# Patient Record
Sex: FEMALE | Race: WHITE | NOT HISPANIC OR LATINO | Employment: OTHER | URBAN - NONMETROPOLITAN AREA
[De-identification: names, ages, dates, MRNs, and addresses within clinical notes are randomized per-mention and may not be internally consistent; named-entity substitution may affect disease eponyms.]

---

## 2020-06-09 ENCOUNTER — OFFICE VISIT (OUTPATIENT)
Dept: OBGYN CLINIC | Facility: CLINIC | Age: 70
End: 2020-06-09
Payer: MEDICARE

## 2020-06-09 ENCOUNTER — APPOINTMENT (OUTPATIENT)
Dept: RADIOLOGY | Facility: MEDICAL CENTER | Age: 70
End: 2020-06-09
Payer: MEDICARE

## 2020-06-09 VITALS
WEIGHT: 174 LBS | DIASTOLIC BLOOD PRESSURE: 108 MMHG | TEMPERATURE: 98.4 F | HEIGHT: 68 IN | BODY MASS INDEX: 26.37 KG/M2 | SYSTOLIC BLOOD PRESSURE: 175 MMHG

## 2020-06-09 DIAGNOSIS — M75.41 IMPINGEMENT SYNDROME OF RIGHT SHOULDER: ICD-10-CM

## 2020-06-09 DIAGNOSIS — M47.22 OSTEOARTHRITIS OF SPINE WITH RADICULOPATHY, CERVICAL REGION: ICD-10-CM

## 2020-06-09 DIAGNOSIS — M75.42 IMPINGEMENT SYNDROME OF LEFT SHOULDER: ICD-10-CM

## 2020-06-09 DIAGNOSIS — M50.20 HNP (HERNIATED NUCLEUS PULPOSUS), CERVICAL: Primary | ICD-10-CM

## 2020-06-09 DIAGNOSIS — M50.20 HNP (HERNIATED NUCLEUS PULPOSUS), CERVICAL: ICD-10-CM

## 2020-06-09 PROBLEM — M47.812 DJD (DEGENERATIVE JOINT DISEASE), CERVICAL: Status: ACTIVE | Noted: 2020-06-09

## 2020-06-09 PROBLEM — M47.812 DJD (DEGENERATIVE JOINT DISEASE), CERVICAL: Status: RESOLVED | Noted: 2020-06-09 | Resolved: 2020-06-09

## 2020-06-09 PROCEDURE — 73030 X-RAY EXAM OF SHOULDER: CPT

## 2020-06-09 PROCEDURE — 72040 X-RAY EXAM NECK SPINE 2-3 VW: CPT

## 2020-06-09 PROCEDURE — 20610 DRAIN/INJ JOINT/BURSA W/O US: CPT | Performed by: ORTHOPAEDIC SURGERY

## 2020-06-09 PROCEDURE — 99213 OFFICE O/P EST LOW 20 MIN: CPT | Performed by: ORTHOPAEDIC SURGERY

## 2020-06-09 RX ORDER — FUROSEMIDE 20 MG/1
20 TABLET ORAL
COMMUNITY
Start: 2019-05-06 | End: 2021-01-05

## 2020-06-09 RX ORDER — IBUPROFEN 800 MG/1
200 TABLET ORAL
Status: ON HOLD | COMMUNITY
Start: 2019-06-27 | End: 2021-01-20 | Stop reason: CLARIF

## 2020-06-09 RX ORDER — METOPROLOL SUCCINATE 25 MG/1
25 TABLET, EXTENDED RELEASE ORAL DAILY
COMMUNITY
Start: 2019-12-03

## 2020-06-09 RX ORDER — AZITHROMYCIN 250 MG/1
TABLET, FILM COATED ORAL
COMMUNITY
Start: 2020-05-13 | End: 2021-01-05

## 2020-06-09 RX ORDER — OMEPRAZOLE 20 MG/1
20 CAPSULE, DELAYED RELEASE ORAL AS NEEDED
COMMUNITY

## 2020-06-09 RX ADMIN — METHYLPREDNISOLONE ACETATE 2 ML: 40 INJECTION, SUSPENSION INTRA-ARTICULAR; INTRALESIONAL; INTRAMUSCULAR; SOFT TISSUE at 14:05

## 2020-06-09 RX ADMIN — BUPIVACAINE HYDROCHLORIDE 6 ML: 5 INJECTION, SOLUTION EPIDURAL; INTRACAUDAL at 14:05

## 2020-06-10 ENCOUNTER — EVALUATION (OUTPATIENT)
Dept: PHYSICAL THERAPY | Facility: CLINIC | Age: 70
End: 2020-06-10
Payer: MEDICARE

## 2020-06-10 DIAGNOSIS — M47.22 OSTEOARTHRITIS OF SPINE WITH RADICULOPATHY, CERVICAL REGION: ICD-10-CM

## 2020-06-10 DIAGNOSIS — M75.41 IMPINGEMENT SYNDROME OF RIGHT SHOULDER: ICD-10-CM

## 2020-06-10 DIAGNOSIS — M75.42 IMPINGEMENT SYNDROME OF LEFT SHOULDER: Primary | ICD-10-CM

## 2020-06-10 PROCEDURE — 97162 PT EVAL MOD COMPLEX 30 MIN: CPT | Performed by: PHYSICAL THERAPIST

## 2020-06-10 PROCEDURE — 97110 THERAPEUTIC EXERCISES: CPT | Performed by: PHYSICAL THERAPIST

## 2020-06-10 PROCEDURE — 97014 ELECTRIC STIMULATION THERAPY: CPT | Performed by: PHYSICAL THERAPIST

## 2020-06-14 RX ORDER — METHYLPREDNISOLONE ACETATE 40 MG/ML
2 INJECTION, SUSPENSION INTRA-ARTICULAR; INTRALESIONAL; INTRAMUSCULAR; SOFT TISSUE
Status: DISCONTINUED | OUTPATIENT
Start: 2020-06-09 | End: 2021-01-20 | Stop reason: CLARIF

## 2020-06-14 RX ORDER — BUPIVACAINE HYDROCHLORIDE 5 MG/ML
6 INJECTION, SOLUTION EPIDURAL; INTRACAUDAL
Status: DISCONTINUED | OUTPATIENT
Start: 2020-06-09 | End: 2021-01-20 | Stop reason: CLARIF

## 2020-06-15 ENCOUNTER — APPOINTMENT (OUTPATIENT)
Dept: PHYSICAL THERAPY | Facility: CLINIC | Age: 70
End: 2020-06-15
Payer: MEDICARE

## 2020-06-15 ENCOUNTER — OFFICE VISIT (OUTPATIENT)
Dept: PHYSICAL THERAPY | Facility: CLINIC | Age: 70
End: 2020-06-15
Payer: MEDICARE

## 2020-06-15 DIAGNOSIS — M75.42 IMPINGEMENT SYNDROME OF LEFT SHOULDER: Primary | ICD-10-CM

## 2020-06-15 DIAGNOSIS — M47.22 OSTEOARTHRITIS OF SPINE WITH RADICULOPATHY, CERVICAL REGION: ICD-10-CM

## 2020-06-15 DIAGNOSIS — M75.41 IMPINGEMENT SYNDROME OF RIGHT SHOULDER: ICD-10-CM

## 2020-06-15 PROCEDURE — 97110 THERAPEUTIC EXERCISES: CPT | Performed by: PHYSICAL THERAPIST

## 2020-06-15 PROCEDURE — 97140 MANUAL THERAPY 1/> REGIONS: CPT | Performed by: PHYSICAL THERAPIST

## 2020-06-15 PROCEDURE — 97014 ELECTRIC STIMULATION THERAPY: CPT | Performed by: PHYSICAL THERAPIST

## 2020-06-15 PROCEDURE — 97112 NEUROMUSCULAR REEDUCATION: CPT | Performed by: PHYSICAL THERAPIST

## 2020-06-17 ENCOUNTER — OFFICE VISIT (OUTPATIENT)
Dept: PHYSICAL THERAPY | Facility: CLINIC | Age: 70
End: 2020-06-17
Payer: MEDICARE

## 2020-06-17 DIAGNOSIS — M75.42 IMPINGEMENT SYNDROME OF LEFT SHOULDER: Primary | ICD-10-CM

## 2020-06-17 DIAGNOSIS — M47.22 OSTEOARTHRITIS OF SPINE WITH RADICULOPATHY, CERVICAL REGION: ICD-10-CM

## 2020-06-17 DIAGNOSIS — M75.41 IMPINGEMENT SYNDROME OF RIGHT SHOULDER: ICD-10-CM

## 2020-06-17 PROCEDURE — 97110 THERAPEUTIC EXERCISES: CPT | Performed by: PHYSICAL THERAPIST

## 2020-06-17 PROCEDURE — 97014 ELECTRIC STIMULATION THERAPY: CPT | Performed by: PHYSICAL THERAPIST

## 2020-06-17 PROCEDURE — 97140 MANUAL THERAPY 1/> REGIONS: CPT | Performed by: PHYSICAL THERAPIST

## 2020-06-17 PROCEDURE — 97112 NEUROMUSCULAR REEDUCATION: CPT | Performed by: PHYSICAL THERAPIST

## 2020-06-22 ENCOUNTER — OFFICE VISIT (OUTPATIENT)
Dept: PHYSICAL THERAPY | Facility: CLINIC | Age: 70
End: 2020-06-22
Payer: MEDICARE

## 2020-06-22 DIAGNOSIS — M75.42 IMPINGEMENT SYNDROME OF LEFT SHOULDER: Primary | ICD-10-CM

## 2020-06-22 DIAGNOSIS — M75.41 IMPINGEMENT SYNDROME OF RIGHT SHOULDER: ICD-10-CM

## 2020-06-22 DIAGNOSIS — M47.22 OSTEOARTHRITIS OF SPINE WITH RADICULOPATHY, CERVICAL REGION: ICD-10-CM

## 2020-06-22 PROCEDURE — 97140 MANUAL THERAPY 1/> REGIONS: CPT | Performed by: PHYSICAL THERAPIST

## 2020-06-22 PROCEDURE — 97112 NEUROMUSCULAR REEDUCATION: CPT | Performed by: PHYSICAL THERAPIST

## 2020-06-22 PROCEDURE — 97035 APP MDLTY 1+ULTRASOUND EA 15: CPT | Performed by: PHYSICAL THERAPIST

## 2020-06-22 PROCEDURE — 97110 THERAPEUTIC EXERCISES: CPT | Performed by: PHYSICAL THERAPIST

## 2020-06-22 PROCEDURE — 97014 ELECTRIC STIMULATION THERAPY: CPT | Performed by: PHYSICAL THERAPIST

## 2020-06-24 ENCOUNTER — OFFICE VISIT (OUTPATIENT)
Dept: PHYSICAL THERAPY | Facility: CLINIC | Age: 70
End: 2020-06-24
Payer: MEDICARE

## 2020-06-24 DIAGNOSIS — M75.41 IMPINGEMENT SYNDROME OF RIGHT SHOULDER: ICD-10-CM

## 2020-06-24 DIAGNOSIS — M75.42 IMPINGEMENT SYNDROME OF LEFT SHOULDER: Primary | ICD-10-CM

## 2020-06-24 DIAGNOSIS — M47.22 OSTEOARTHRITIS OF SPINE WITH RADICULOPATHY, CERVICAL REGION: ICD-10-CM

## 2020-06-24 PROCEDURE — 97112 NEUROMUSCULAR REEDUCATION: CPT | Performed by: PHYSICAL THERAPIST

## 2020-06-24 PROCEDURE — 97035 APP MDLTY 1+ULTRASOUND EA 15: CPT | Performed by: PHYSICAL THERAPIST

## 2020-06-24 PROCEDURE — 97014 ELECTRIC STIMULATION THERAPY: CPT | Performed by: PHYSICAL THERAPIST

## 2020-06-24 PROCEDURE — 97140 MANUAL THERAPY 1/> REGIONS: CPT | Performed by: PHYSICAL THERAPIST

## 2020-06-24 PROCEDURE — 97110 THERAPEUTIC EXERCISES: CPT | Performed by: PHYSICAL THERAPIST

## 2020-06-29 ENCOUNTER — OFFICE VISIT (OUTPATIENT)
Dept: PHYSICAL THERAPY | Facility: CLINIC | Age: 70
End: 2020-06-29
Payer: MEDICARE

## 2020-06-29 DIAGNOSIS — M75.42 IMPINGEMENT SYNDROME OF LEFT SHOULDER: Primary | ICD-10-CM

## 2020-06-29 DIAGNOSIS — M75.41 IMPINGEMENT SYNDROME OF RIGHT SHOULDER: ICD-10-CM

## 2020-06-29 DIAGNOSIS — M47.22 OSTEOARTHRITIS OF SPINE WITH RADICULOPATHY, CERVICAL REGION: ICD-10-CM

## 2020-06-29 PROCEDURE — 97112 NEUROMUSCULAR REEDUCATION: CPT

## 2020-06-29 PROCEDURE — 97110 THERAPEUTIC EXERCISES: CPT

## 2020-06-29 PROCEDURE — 97035 APP MDLTY 1+ULTRASOUND EA 15: CPT

## 2020-06-29 PROCEDURE — 97140 MANUAL THERAPY 1/> REGIONS: CPT

## 2020-07-01 ENCOUNTER — OFFICE VISIT (OUTPATIENT)
Dept: PHYSICAL THERAPY | Facility: CLINIC | Age: 70
End: 2020-07-01
Payer: MEDICARE

## 2020-07-01 DIAGNOSIS — M75.42 IMPINGEMENT SYNDROME OF LEFT SHOULDER: Primary | ICD-10-CM

## 2020-07-01 DIAGNOSIS — M47.22 OSTEOARTHRITIS OF SPINE WITH RADICULOPATHY, CERVICAL REGION: ICD-10-CM

## 2020-07-01 DIAGNOSIS — M75.41 IMPINGEMENT SYNDROME OF RIGHT SHOULDER: ICD-10-CM

## 2020-07-01 PROCEDURE — 97140 MANUAL THERAPY 1/> REGIONS: CPT | Performed by: PHYSICAL THERAPIST

## 2020-07-01 PROCEDURE — 97110 THERAPEUTIC EXERCISES: CPT | Performed by: PHYSICAL THERAPIST

## 2020-07-01 PROCEDURE — 97035 APP MDLTY 1+ULTRASOUND EA 15: CPT | Performed by: PHYSICAL THERAPIST

## 2020-07-01 PROCEDURE — 97112 NEUROMUSCULAR REEDUCATION: CPT | Performed by: PHYSICAL THERAPIST

## 2020-07-01 PROCEDURE — 97014 ELECTRIC STIMULATION THERAPY: CPT | Performed by: PHYSICAL THERAPIST

## 2020-07-01 NOTE — PROGRESS NOTES
Daily Note     Today's date: 2020  Patient name: Chris Muse  : 1950  MRN: [id number]  Referring provider: Ankit Oleary DO  Dx:   Encounter Diagnosis     ICD-10-CM    1  Impingement syndrome of left shoulder M75 42    2  Impingement syndrome of right shoulder M75 41    3  Osteoarthritis of spine with radiculopathy, cervical region M47 22                   Subjective: Pt reports R shoulder was more sore than it has been this morning  Pt reports she did not sleep well and reports difficulty lifting R arm this date  Objective: See treatment diary below  R shoulder flex AROM when entering 110, PROM after joint mobilization 160 flex and 170 abduction with soreness and pain at end range  Increased tenderness anterior shoulder this date  Assessment: Tolerated treatment well  Patient demonstrated fatigue post treatment and exhibited good technique with therapeutic exercises  Pt with increased R shoulder AROM flex to 155 degrees after manual therapy and joint mobilization with decreased pain and tightness  Pt did note soreness with TE and pec stretch  Plan: Continue per plan of care        Precautions: none      DATE: 7/1/20 6/15/20 6/17/20 6/22/20 6/24 6/29   Visit # 7 2 3 4 5 6   Pain 8 R 8 R 7 R w/ mov 7 R with movement 6/10 R shoulder 2/10R  s move  6/10 with   Manuals         jt mobilization B/L shoulders [facility]   PROM B/L shoulders [facility]   STM C-Spine [facility] ds   Traction/retra ext C-spine [facility] ds   Neuro Re-Ed         scap retraction x20 x20 x30 x30  home   UBE retro 10 min L4 8 min L3 8m L3 8 min L4 8 min L4 8m L4   T-band SHALONDA L4 3/10 L3 2/10 L3 3/10 L4 3/10 L4 3/10 L4 3/10   T-band tricep ext L4 3/10 L3 2/10 L3 3/10 L4 3/10 L4 3/10 L4 3/10   T-band row L4 3/10 L3 2/10 L3 3/10 L4 3/10 L4 3/10 L4 3/10   T-band IR L4 3/10 L3 2/10 L3 3/10 L4 3/10 L4 3/10 L4 3/10   T-band ER L4 3/10 L3 2/10 L3 3/10 L4 3/10 L4 3/10 L4 3/10   Ther Ex         Pendulums B/L 3# x30 x20 2/15 3# 2/15 3# 2/15 3# 2/15   Doorway Stretch 4x 15" 4 x15" 4 x20" 4x 20" 4 x20" 4x 20"   Levator stretch 4x 15" 4 x15" 4x 20" 4x 20" 4 x20" 4x 20"   Upper trap str 4x 15" 4 x 15" 4x 20" 4x 20" 4 x20" 4x 20"   Standing T's/Y's 2/10  2/10 2/10 2/10 2/10   Serratus punches 3# 2/15  3# 2/10 3# 2/10 3# 2/15 3# 2/15   SL abd/er 1# 2/10   --------> 1# 2/10 1# 2/10   Ther Activity                                    Modalities         CPw/ pre mod B/L shoulders 15 min 15 min 15 min 15 min 15 min 15m   US 3Mhz 1 0 W/cm R shoulder 10   8 min 8 min 10m

## 2020-07-06 ENCOUNTER — OFFICE VISIT (OUTPATIENT)
Dept: PHYSICAL THERAPY | Facility: CLINIC | Age: 70
End: 2020-07-06
Payer: MEDICARE

## 2020-07-06 DIAGNOSIS — M47.22 OSTEOARTHRITIS OF SPINE WITH RADICULOPATHY, CERVICAL REGION: ICD-10-CM

## 2020-07-06 DIAGNOSIS — M75.41 IMPINGEMENT SYNDROME OF RIGHT SHOULDER: ICD-10-CM

## 2020-07-06 DIAGNOSIS — M75.42 IMPINGEMENT SYNDROME OF LEFT SHOULDER: Primary | ICD-10-CM

## 2020-07-06 PROCEDURE — 97112 NEUROMUSCULAR REEDUCATION: CPT | Performed by: PHYSICAL THERAPIST

## 2020-07-06 PROCEDURE — 97035 APP MDLTY 1+ULTRASOUND EA 15: CPT | Performed by: PHYSICAL THERAPIST

## 2020-07-06 PROCEDURE — 97140 MANUAL THERAPY 1/> REGIONS: CPT | Performed by: PHYSICAL THERAPIST

## 2020-07-06 PROCEDURE — 97014 ELECTRIC STIMULATION THERAPY: CPT | Performed by: PHYSICAL THERAPIST

## 2020-07-06 PROCEDURE — 97110 THERAPEUTIC EXERCISES: CPT | Performed by: PHYSICAL THERAPIST

## 2020-07-06 NOTE — PROGRESS NOTES
Daily Note     Today's date: 2020  Patient name: Mitra Nassar  : 1950  MRN: [id number]  Referring provider: Michael Paz DO  Dx:   Encounter Diagnosis     ICD-10-CM    1  Impingement syndrome of left shoulder M75 42    2  Impingement syndrome of right shoulder M75 41    3  Osteoarthritis of spine with radiculopathy, cervical region M47 22                   Subjective: Pt notes Friday her R shoulder was feeling pretty good but Saturday it was hurting again  Pt reports today Right shoulder continues to hurt with pain rated 7/10  Objective: See treatment diary below  Pt still with painful arc and end-range discomfort R shoulder during PROM flex abd and IR  No pain with PROM L shoulder  Increased to 2# weight with sidelying ER      Assessment: Tolerated treatment well  Patient unable to progress to 2# weight with SL abd due to pain and weakness R UE  Pt able to perform 2# weight with L UE  Verbal cues still required for proper posture and technique with theraband scapular strengthening and stabilization exercises  Plan: Continue per plan of care        Precautions: none      DATE: 20   Visit # 7 8 3 4 5 6   Pain 8 R 7 R 7 R w/ mov 7 R with movement 6/10 R shoulder 2/10R  s move  6/10 with   Manuals         jt mobilization B/L shoulders [facility] ds   PROM B/L shoulders [facility] ds   STM C-Spine [facility] ds   Traction/retra ext C-spine [facility] ds   Neuro Re-Ed         scap retraction x20 x20 x30 x30  home   UBE retro 10 min L4 10 min L4 8m L3 8 min L4 8 min L4 8m L4   T-band SHALONDA L4 3/10 L4 3/10 L3 3/10 L4 3/10 L4 3/10 L4 3/10   T-band tricep ext L4 3/10 L4 3/10 L3 3/10 L4 3/10 L4 3/10 L4 3/10   T-band row L4 3/10 L4 3/10 L3 3/10 L4 3/10 L4 3/10 L4 3/10   T-band IR L4 3/10 L4 3/10 L3 3/10 L4 3/10 L4 3/10 L4 3/10   T-band ER L4 3/10 L4 3/10 L3 3/10 L4 3/10 L4 3/10 L4 3/10   Ther Ex         Pendulums B/L 3# x30 3# x30 215 3# 2/15 3# 2/15 3# 2/15   Doorway Stretch 4x 15" 4 x15" 4 x20" 4x 20" 4 x20" 4x 20"   Levator stretch 4x 15" 4 x15" 4x 20" 4x 20" 4 x20" 4x 20"   Upper trap str 4x 15" 4 x 15" 4x 20" 4x 20" 4 x20" 4x 20"   Standing T's/Y's 2/10 2/10 2/10 2/10 2/10 2/10   Serratus punches 3# 2/15 3# 2/15 3# 2/10 3# 2/10 3# 2/15 3# 2/15   SL abd/er 1# 2/10 2# L 1# R abd 2/15  --------> 1# 2/10 1# 2/10   Ther Activity                                    Modalities         CPw/ pre mod B/L shoulders 15 min 15 min 15 min 15 min 15 min 15m   US 3Mhz 1 0 W/cm R shoulder 10 10 min  8 min 8 min 10m

## 2020-07-08 ENCOUNTER — EVALUATION (OUTPATIENT)
Dept: PHYSICAL THERAPY | Facility: CLINIC | Age: 70
End: 2020-07-08
Payer: MEDICARE

## 2020-07-08 DIAGNOSIS — M75.42 IMPINGEMENT SYNDROME OF LEFT SHOULDER: Primary | ICD-10-CM

## 2020-07-08 DIAGNOSIS — M75.41 IMPINGEMENT SYNDROME OF RIGHT SHOULDER: ICD-10-CM

## 2020-07-08 DIAGNOSIS — M47.22 OSTEOARTHRITIS OF SPINE WITH RADICULOPATHY, CERVICAL REGION: ICD-10-CM

## 2020-07-08 PROCEDURE — 97035 APP MDLTY 1+ULTRASOUND EA 15: CPT | Performed by: PHYSICAL THERAPIST

## 2020-07-08 PROCEDURE — 97110 THERAPEUTIC EXERCISES: CPT | Performed by: PHYSICAL THERAPIST

## 2020-07-08 PROCEDURE — 97014 ELECTRIC STIMULATION THERAPY: CPT | Performed by: PHYSICAL THERAPIST

## 2020-07-08 PROCEDURE — 97112 NEUROMUSCULAR REEDUCATION: CPT | Performed by: PHYSICAL THERAPIST

## 2020-07-08 PROCEDURE — 97140 MANUAL THERAPY 1/> REGIONS: CPT | Performed by: PHYSICAL THERAPIST

## 2020-07-08 NOTE — LETTER
2020    Pastor Suzy DO  [address]  [address] [place] [address]    Patient: Aniya John   YOB: 1950   Date of Visit: 2020     Encounter Diagnosis     ICD-10-CM    1  Impingement syndrome of left shoulder M75 42    2  Impingement syndrome of right shoulder M75 41    3  Osteoarthritis of spine with radiculopathy, cervical region M47 22        Dear Dr Joshua Macedo:    Thank you for your recent referral of Aniya John  Please review the attached evaluation summary from [facility] recent visit  Please verify that you agree with the plan of care by signing the attached order  If you have any questions or concerns, please do not hesitate to call  I sincerely appreciate the opportunity to share in the care of one of your patients and hope to have another opportunity to work with you in the near future  Sincerely,    Carmela Carrera, PT      Referring Provider:      I certify that I have read the below Plan of Care and certify the need for these services furnished under this plan of treatment while under my care  Pastor Suzy DO  [address]  [address] [facility] [address] [place] [address] 80: [phone]          PT Re-Evaluation     Today's date: 2020  Patient name: Aniya John  : 1950  MRN: [id number]  Referring provider: Aba Sheppard DO  Dx:   Encounter Diagnosis     ICD-10-CM    1  Impingement syndrome of left shoulder M75 42    2  Impingement syndrome of right shoulder M75 41    3  Osteoarthritis of spine with radiculopathy, cervical region M47 22                   Assessment  Assessment details: Pt is a 81year old female presenting to Outpatient PT with chief complaint of neck stiffness and soreness with bilateral shoulder pain radiating to lateral arm   Pt seen for 9 visits of Outpatient PT with treatment consisting of US to R shoulder, PROM joint mobilization, soft tissue mobilization with manual stretching to C-Spine, scapular stabilization postural correction and stretching exercises with HEP  Pt has demonstrated good improvement in symptoms with therapy  Pt with pain abolished at L shoulder with movement and activity with normal ROM all planes  Pt still with impingement symptoms with limitation in sleep tolerance and ROM R shoulder  Pt with decreased frequency of radiating symptoms into neck with decreased trigger point R levator  Pt still with limitation in IR and overhead reaching/lifting with R UE  Pt is making good progress toward all goals  Pt would benefit from continued activity in PT to further improve posture R shoulder pain, ROM strength and tolerance for all functional activity with R UE  Impairments: abnormal muscle tone, abnormal or restricted ROM, activity intolerance, impaired physical strength, lacks appropriate home exercise program, pain with function and poor posture   Functional limitations: sleep tolerance, lifting tolerance, reaching to and above shoulder level  Symptom irritability: moderateUnderstanding of Dx/Px/POC: good   Prognosis: good    Goals  STG's to be met in 3-4 weeks:  1  Decrease pain with activity by 25% bilateral shoulders- met  2  Improve ROM bilateral shoulders by 5-10 degrees- met  3  Improve bilateral UE strength by 3-5 lbs all motions tested- partially met  4  Promote postural correction to decrease impingement- on going   5  Pt will perform all ADL's and dressing pain-free- not met  6  Initiate HEP of stretching postural correction and scapular strengthening- met    LTG's to be met in 7-8 weeks:  1  Decrease bilateral shoulder pain with activity to 0-2/10- met on L not met R  2  Maximize bilateral shoulder ROM needed for performance of IADL's- met L, not met on R  3  Improve bilateral shoulder strength by 8 lbs all motions- not met  4  Pt will be able to reach to and above shoulder level pain free- progressing not met  5  Pt will perform all ADL's and IADL's pain free- not met  6  Improve sleep tolerance to waking less than 2 nights per week- not met  7  Pt will be able to reach behind back or behind seat in car without discomfort- met L not met on R  8  Improve Foto score to at least 65- not met 64    Plan  Patient would benefit from: skilled physical therapy  Planned modality interventions: cryotherapy, ultrasound and unattended electrical stimulation  Other planned modality interventions: modalities to be added or modified at therapists discretion  Planned therapy interventions: joint mobilization, manual therapy, patient education, postural training, neuromuscular re-education, strengthening, stretching, therapeutic exercise, therapeutic activities, flexibility and home exercise program  Frequency: 2x week  Duration in weeks: 4  Plan of Care beginning date: 2020  Plan of Care expiration date: 2020  Treatment plan discussed with: patient and PTA        Subjective Evaluation    History of Present Illness  Date of onset: 2020  Mechanism of injury: Pt reports L shoulder is pretty good  Pt has seen good gains with L shoulder  Pt notes sleeping is still difficult with regard to R shoulder  Pain persists at R shoulder worse in the morning but decreases with exercises and use  Pt is always present at R shoulder   Pt does report decreased symptoms at R neck          Not a recurrent problem   Quality of life: good    Pain  Current pain ratin  At best pain ratin  At worst pain ratin  Location: R shoulder  Quality: sharp and throbbing  Relieving factors: ice and medications  Aggravating factors: lifting and overhead activity    Treatments  Current treatment: injection treatment and physical therapy  Patient Goals  Patient goals for therapy: decreased pain, increased motion, increased strength, independence with ADLs/IADLs and return to sport/leisure activities          Objective     Observations     Additional Observation Details  Mild forward shoulder positioning and forward head posture in sitting  Equal shoulder resting height, symmetrical scapular positioning    Palpation     Additional Palpation Details  Moderate tightness cervical paraspinals and bilateral supraspinatus muscles- decreased to minimal, trigger point still noted R levator  Moderate tenderness to palpation bilateral supraspinatus tendons- no tenderness L supraspinatus, R remains moderate    Cervical/Thoracic Screen   Cervical range of motion within normal limits    Neurological Testing     Sensation     Shoulder   Left Shoulder   Intact: light touch    Right Shoulder   Intact: light touch    Active Range of Motion   Left Shoulder   Flexion: 160 degrees   Abduction: 170 degrees   External rotation 90°:  88 degrees WFL  External rotation BTH: T4 WFL  Internal rotation 90°:  78 degrees   Internal rotation BTB: T8     Right Shoulder   Flexion: 150 degrees   Abduction: 151 degrees   External rotation 90°:  88 degreesWFL  External rotation BTH: T4 WFL  Internal rotation 90°:  74 degrees   Internal rotation BTB: T12     Passive Range of Motion     Right Shoulder   Flexion: 165 degrees   Abduction: 156 degrees     Strength/Myotome Testing     Additional Strength Details  Shoulder                  R                 L  Flex                    16  lbs              20 lbs  Abd                     16 lbs              20 lbs  IR                        18 lbs           25lbs  ER                      18 lbs            14 lbs    Elbow  Flex                     24 lbs            25 lbs  Ext                       21 lbs           25 lbs                         45 lbs            40 lbs    Tests     Additional Tests Details  (+) Judyann Croft bilaterally - (-) on Left, still (+) on Right  (+) Empty Can with pain and weakness- (-) on Left still (+) on Right    General Comments:      Shoulder Comments   Increased pain with reaching to and above shoulder level-decreased pain with reaching to and above S' level, no pain with L UE  Poor sleep tolerance waking nightly due to pain- pt still wakes nightly due to R shoulder pain  Limited ability to perform dressing and ADL's due to pain- pt still reports modification for dressing but still (I)  Difficulty with prolonged overhead activity- improved but still pain with prolonged overhead activity  Poor tolerance for reaching behind back or to back seat of car- still poor with right, no difficulty with Left  Lifting tolerance 8 lbs to shoulder level- Improved to 10 lbs  Foto- 53- increased to 61             Precautions: none  DATE: 7/1/20 7/6/20 7/8/20 6/22/20 6/24 6/29   Visit # 7 8 9 4 5 6   Pain 8 R 7 R 5 R w/ mov 7 R with movement 6/10 R shoulder 2/10R  s move  6/10 with   Manuals         jt mobilization B/L shoulders [facility] ds   PROM B/L shoulders [facility] ds   STM C-Spine [facility] ds   Traction/retra ext C-spine [facility] ds   Neuro Re-Ed         scap retraction x20 x20 x30 x30  home   UBE retro 10 min L4 10 min L4 8m L4 8 min L4 8 min L4 8m L4   T-band SHALONDA L4 3/10 L4 3/10 L4 3/10 L4 3/10 L4 3/10 L4 3/10   T-band tricep ext L4 3/10 L4 3/10 L4 3/10 L4 3/10 L4 3/10 L4 3/10   T-band row L4 3/10 L4 3/10 L4 3/10 L4 3/10 L4 3/10 L4 3/10   T-band IR L4 3/10 L4 3/10 L4 3/10 L4 3/10 L4 3/10 L4 3/10   T-band ER L4 3/10 L4 3/10 L4 3/10 L4 3/10 L4 3/10 L4 3/10   Ther Ex         Pendulums B/L 3# x30 3# x30 3# 2/15 3# 2/15 3# 2/15 3# 2/15   Doorway Stretch 4x 15" 4 x15" 4 x20" 4x 20" 4 x20" 4x 20"   Levator stretch 4x 15" 4 x15" 4x 20" 4x 20" 4 x20" 4x 20"   Upper trap str 4x 15" 4 x 15" 4x 20" 4x 20" 4 x20" 4x 20"   Standing T's/Y's 2/10 2/10 2/10 2/10 2/10 2/10   Serratus punches 3# 2/15 3# 2/15 3# 2/15 3# 2/10 3# 2/15 3# 2/15   SL abd/er 1# 2/10 2# L 1# R abd 2/15 2#  2/15 --------> 1# 2/10 1# 2/10   Ther Activity                                    Modalities         CPw/ pre mod B/L shoulders 15 min 15 min 15 min 15 min 15 min 15m   US 3Mhz 1 0 W/cm R shoulder 10 10 min 10 min 8 min 8 min 10m

## 2020-07-08 NOTE — PROGRESS NOTES
PT Re-Evaluation     Today's date: 2020  Patient name: Simone Silva  : 1950  MRN: [id number]  Referring provider: Ankur Bravo DO  Dx:   Encounter Diagnosis     ICD-10-CM    1  Impingement syndrome of left shoulder M75 42    2  Impingement syndrome of right shoulder M75 41    3  Osteoarthritis of spine with radiculopathy, cervical region M47 22                   Assessment  Assessment details: Pt is a 81year old female presenting to Outpatient PT with chief complaint of neck stiffness and soreness with bilateral shoulder pain radiating to lateral arm  Pt seen for 9 visits of Outpatient PT with treatment consisting of US to R shoulder, PROM joint mobilization, soft tissue mobilization with manual stretching to C-Spine, scapular stabilization postural correction and stretching exercises with HEP  Pt has demonstrated good improvement in symptoms with therapy  Pt with pain abolished at L shoulder with movement and activity with normal ROM all planes  Pt still with impingement symptoms with limitation in sleep tolerance and ROM R shoulder  Pt with decreased frequency of radiating symptoms into neck with decreased trigger point R levator  Pt still with limitation in IR and overhead reaching/lifting with R UE  Pt is making good progress toward all goals  Pt would benefit from continued activity in PT to further improve posture R shoulder pain, ROM strength and tolerance for all functional activity with R UE  Impairments: abnormal muscle tone, abnormal or restricted ROM, activity intolerance, impaired physical strength, lacks appropriate home exercise program, pain with function and poor posture   Functional limitations: sleep tolerance, lifting tolerance, reaching to and above shoulder level  Symptom irritability: moderateUnderstanding of Dx/Px/POC: good   Prognosis: good    Goals  STG's to be met in 3-4 weeks:  1  Decrease pain with activity by 25% bilateral shoulders- met  2   Improve ROM bilateral shoulders by 5-10 degrees- met  3  Improve bilateral UE strength by 3-5 lbs all motions tested- partially met  4  Promote postural correction to decrease impingement- on going   5  Pt will perform all ADL's and dressing pain-free- not met  6  Initiate HEP of stretching postural correction and scapular strengthening- met    LTG's to be met in 7-8 weeks:  1  Decrease bilateral shoulder pain with activity to 0-2/10- met on L not met R  2  Maximize bilateral shoulder ROM needed for performance of IADL's- met L, not met on R  3  Improve bilateral shoulder strength by 8 lbs all motions- not met  4  Pt will be able to reach to and above shoulder level pain free- progressing not met  5  Pt will perform all ADL's and IADL's pain free- not met  6  Improve sleep tolerance to waking less than 2 nights per week- not met  7  Pt will be able to reach behind back or behind seat in car without discomfort- met L not met on R  8  Improve Foto score to at least 65- not met 64    Plan  Patient would benefit from: skilled physical therapy  Planned modality interventions: cryotherapy, ultrasound and unattended electrical stimulation  Other planned modality interventions: modalities to be added or modified at therapists discretion  Planned therapy interventions: joint mobilization, manual therapy, patient education, postural training, neuromuscular re-education, strengthening, stretching, therapeutic exercise, therapeutic activities, flexibility and home exercise program  Frequency: 2x week  Duration in weeks: 4  Plan of Care beginning date: 7/8/2020  Plan of Care expiration date: 8/22/2020  Treatment plan discussed with: patient and PTA        Subjective Evaluation    History of Present Illness  Date of onset: 5/26/2020  Mechanism of injury: Pt reports L shoulder is pretty good  Pt has seen good gains with L shoulder  Pt notes sleeping is still difficult with regard to R shoulder   Pain persists at R shoulder worse in the morning but decreases with exercises and use  Pt is always present at R shoulder   Pt does report decreased symptoms at R neck          Not a recurrent problem   Quality of life: good    Pain  Current pain ratin  At best pain ratin  At worst pain ratin  Location: R shoulder  Quality: sharp and throbbing  Relieving factors: ice and medications  Aggravating factors: lifting and overhead activity    Treatments  Current treatment: injection treatment and physical therapy  Patient Goals  Patient goals for therapy: decreased pain, increased motion, increased strength, independence with ADLs/IADLs and return to sport/leisure activities          Objective     Observations     Additional Observation Details  Mild forward shoulder positioning and forward head posture in sitting  Equal shoulder resting height, symmetrical scapular positioning    Palpation     Additional Palpation Details  Moderate tightness cervical paraspinals and bilateral supraspinatus muscles- decreased to minimal, trigger point still noted R levator  Moderate tenderness to palpation bilateral supraspinatus tendons- no tenderness L supraspinatus, R remains moderate    Cervical/Thoracic Screen   Cervical range of motion within normal limits    Neurological Testing     Sensation     Shoulder   Left Shoulder   Intact: light touch    Right Shoulder   Intact: light touch    Active Range of Motion   Left Shoulder   Flexion: 160 degrees   Abduction: 170 degrees   External rotation 90°: 88 degrees WFL  External rotation BTH: T4 WFL  Internal rotation 90°: 78 degrees   Internal rotation BTB: T8     Right Shoulder   Flexion: 150 degrees   Abduction: 151 degrees   External rotation 90°: 88 degreesWFL  External rotation BTH: T4 WFL  Internal rotation 90°: 74 degrees   Internal rotation BTB: T12     Passive Range of Motion     Right Shoulder   Flexion: 165 degrees   Abduction: 156 degrees     Strength/Myotome Testing     Additional Strength Details  Shoulder R                 L  Flex                    16  lbs              20 lbs  Abd                     16 lbs              20 lbs  IR                        18 lbs           25lbs  ER                      18 lbs            14 lbs    Elbow  Flex                     24 lbs            25 lbs  Ext                       21 lbs           25 lbs                         45 lbs            40 lbs    Tests     Additional Tests Details  (+) Shannock Saver bilaterally - (-) on Left, still (+) on Right  (+) Empty Can with pain and weakness- (-) on Left still (+) on Right    General Comments:      Shoulder Comments   Increased pain with reaching to and above shoulder level-decreased pain with reaching to and above S' level, no pain with L UE  Poor sleep tolerance waking nightly due to pain- pt still wakes nightly due to R shoulder pain  Limited ability to perform dressing and ADL's due to pain- pt still reports modification for dressing but still (I)  Difficulty with prolonged overhead activity- improved but still pain with prolonged overhead activity  Poor tolerance for reaching behind back or to back seat of car- still poor with right, no difficulty with Left  Lifting tolerance 8 lbs to shoulder level- Improved to 10 lbs  Foto- 53- increased to 61             Precautions: none  DATE: 7/1/20 7/6/20 7/8/20 6/22/20 6/24 6/29   Visit # 7 8 9 4 5 6   Pain 8 R 7 R 5 R w/ mov 7 R with movement 6/10 R shoulder 2/10R  s move  6/10 with   Manuals         jt mobilization B/L shoulders [facility] ds   PROM B/L shoulders [facility] ds   STM C-Spine [facility] ds   Traction/retra ext C-spine [facility]  DS [facility] ds   Neuro Re-Ed         scap retraction x20 x20 x30 x30  home   UBE retro 10 min L4 10 min L4 8m L4 8 min L4 8 min L4 8m L4   T-band SHALONDA L4 3/10 L4 3/10 L4 3/10 L4 3/10 L4 3/10 L4 3/10   T-band tricep ext L4 3/10 L4 3/10 L4 3/10 L4 3/10 L4 3/10 L4 3/10   T-band row L4 3/10 L4 3/10 L4 3/10 L4 3/10 L4 3/10 L4 3/10   T-band IR L4 3/10 L4 3/10 L4 3/10 L4 3/10 L4 3/10 L4 3/10   T-band ER L4 3/10 L4 3/10 L4 3/10 L4 3/10 L4 3/10 L4 3/10   Ther Ex         Pendulums B/L 3# x30 3# x30 3# 2/15 3# 2/15 3# 2/15 3# 2/15   Doorway Stretch 4x 15" 4 x15" 4 x20" 4x 20" 4 x20" 4x 20"   Levator stretch 4x 15" 4 x15" 4x 20" 4x 20" 4 x20" 4x 20"   Upper trap str 4x 15" 4 x 15" 4x 20" 4x 20" 4 x20" 4x 20"   Standing T's/Y's 2/10 2/10 2/10 2/10 2/10 2/10   Serratus punches 3# 2/15 3# 2/15 3# 2/15 3# 2/10 3# 2/15 3# 2/15   SL abd/er 1# 2/10 2# L 1# R abd 2/15 2#  2/15 --------> 1# 2/10 1# 2/10   Ther Activity                                    Modalities         CPw/ pre mod B/L shoulders 15 min 15 min 15 min 15 min 15 min 15m   US 3Mhz 1 0 W/cm R shoulder 10 10 min 10 min 8 min 8 min 10m

## 2020-07-13 ENCOUNTER — OFFICE VISIT (OUTPATIENT)
Dept: PHYSICAL THERAPY | Facility: CLINIC | Age: 70
End: 2020-07-13
Payer: MEDICARE

## 2020-07-13 DIAGNOSIS — M47.22 OSTEOARTHRITIS OF SPINE WITH RADICULOPATHY, CERVICAL REGION: ICD-10-CM

## 2020-07-13 DIAGNOSIS — M75.41 IMPINGEMENT SYNDROME OF RIGHT SHOULDER: ICD-10-CM

## 2020-07-13 DIAGNOSIS — M75.42 IMPINGEMENT SYNDROME OF LEFT SHOULDER: Primary | ICD-10-CM

## 2020-07-13 PROCEDURE — 97014 ELECTRIC STIMULATION THERAPY: CPT

## 2020-07-13 PROCEDURE — 97140 MANUAL THERAPY 1/> REGIONS: CPT

## 2020-07-13 PROCEDURE — 97110 THERAPEUTIC EXERCISES: CPT

## 2020-07-13 PROCEDURE — 97112 NEUROMUSCULAR REEDUCATION: CPT

## 2020-07-13 PROCEDURE — 97035 APP MDLTY 1+ULTRASOUND EA 15: CPT

## 2020-07-13 NOTE — PROGRESS NOTES
Daily Note     Today's date: 2020  Patient name: Nikita Maier  : 1950  MRN: [id number]  Referring provider: Roma Lewis DO  Dx:   Encounter Diagnosis     ICD-10-CM    1  Impingement syndrome of left shoulder M75 42    2  Impingement syndrome of right shoulder M75 41    3  Osteoarthritis of spine with radiculopathy, cervical region M47 22        Start Time: 1300  Stop Time: 1430  Total time in clinic (min): 90 minutes    Subjective: Pt continues with persisting R shoulder pain  Objective: See treatment diary below      Assessment: Tolerated treatment well  Patient exhibited good technique with therapeutic exercises      Plan: Continue per plan of care        Precautions: none  DATE: 20   Visit # 7 8 9 10 5 6   Pain 8 R 7 R 5 R w/ mov 7R sitting  5 w/ move 6/10 R shoulder 2/10R  s move  6/10 with   Manuals         jt mobilization B/L shoulders [facility] ds  ds   PROM B/L shoulders [facility] ds   STM C-Spine [facility] ds   Traction/retra ext C-spine [facility]  DS Held per PT [facility] ds   Neuro Re-Ed         scap retraction x20 x20 x30 home  home   UBE retro 10 min L4 10 min L4 8m L4 8 m L4 8 min L4 8m L4   T-band SHALONDA L4 3/10 L4 3/10 L4 3/10 L4 2/15 L4 3/10 L4 3/10   T-band tricep ext L4 3/10 L4 3/10 L4 3/10 L4 2/15 L4 3/10 L4 3/10   T-band row L4 3/10 L4 3/10 L4 3/10 L4 2/15 L4 3/10 L4 3/10   T-band IR L4 3/10 L4 3/10 L4 3/10 L4 3/10 L4 3/10 L4 3/10   T-band ER L4 3/10 L4 3/10 L4 3/10 L4 2/15 L4 3/10 L4 3/10   Ther Ex         Pendulums B/L 3# x30 3# x30 3# 2/15 3#   2/15 3# 2/15 3# 2/15   Doorway Stretch 4x 15" 4 x15" 4 x20" 4x 20" 4 x20" 4x 20"   Levator stretch 4x 15" 4 x15" 4x 20" 4x 20" 4 x20" 4x 20"   Upper trap str 4x 15" 4 x 15" 4x 20" 4x 20" 4 x20" 4x 20"   Standing T's/Y's 2/10 2/10 2/10 2/10 2/10 2/10   Serratus punches 3# 2/15 3# 2/15 3# 2/15 3# 2/10 3# 2/15 3# 2/15   SL abd/er 1# 2/10 2# L 1# R abd 2/15 2#  2/15 2# 2/15  1# 2/15 1# 2/10 1# 2/10   Ther Activity                           Modalities         CPw/ pre mod B/L shoulders 15 min 15 min 15 min 15 m 15 min 15m   US 3Mhz 1 0 W/cm R shoulder 10 10 min 10 min 10m 8 min 10m

## 2020-07-15 ENCOUNTER — OFFICE VISIT (OUTPATIENT)
Dept: PHYSICAL THERAPY | Facility: CLINIC | Age: 70
End: 2020-07-15
Payer: MEDICARE

## 2020-07-15 DIAGNOSIS — M47.22 OSTEOARTHRITIS OF SPINE WITH RADICULOPATHY, CERVICAL REGION: ICD-10-CM

## 2020-07-15 DIAGNOSIS — M75.41 IMPINGEMENT SYNDROME OF RIGHT SHOULDER: ICD-10-CM

## 2020-07-15 DIAGNOSIS — M75.42 IMPINGEMENT SYNDROME OF LEFT SHOULDER: Primary | ICD-10-CM

## 2020-07-15 PROCEDURE — 97140 MANUAL THERAPY 1/> REGIONS: CPT

## 2020-07-15 PROCEDURE — 97035 APP MDLTY 1+ULTRASOUND EA 15: CPT

## 2020-07-15 PROCEDURE — 97110 THERAPEUTIC EXERCISES: CPT

## 2020-07-15 PROCEDURE — 97014 ELECTRIC STIMULATION THERAPY: CPT

## 2020-07-15 PROCEDURE — 97112 NEUROMUSCULAR REEDUCATION: CPT

## 2020-07-15 NOTE — PROGRESS NOTES
Daily Note     Today's date: 7/15/2020  Patient name: Emelia Church  : 1950  MRN: [id number]  Referring provider: Maria Isabel Jauregui DO  Dx:   Encounter Diagnosis     ICD-10-CM    1  Impingement syndrome of left shoulder M75 42    2  Impingement syndrome of right shoulder M75 41    3  Osteoarthritis of spine with radiculopathy, cervical region M47 22        Start Time: 1300  Stop Time: 1430  Total time in clinic (min): 90 minutes    Subjective: Pt notes she is less stiff ; also increased flexion ROM  The R shoulder continues to have the most pain  Objective: See treatment diary below  Added rear delt to TE program, ER/ADB R to 2#  Assessment: Tolerated treatment well  Patient exhibited good technique with therapeutic exercises      Plan: Continue per plan of care        Precautions: none  DATE: 7/1/20 7/6/20 7/8/20 7/13 7/15 6/29   Visit # 7 8 9 10 11 6   Pain 8 R 7 R 5 R w/ mov 7R sitting  5 w/ move L 0  R 5 2/10R  s move  6/10 with   Manuals         jt mobilization B/L shoulders [facility] ds ds ds   PROM B/L shoulders [facility] ds ds ds   STM C-Spine [facility] ds   Traction/retra ext C-spine [facility]  DS Held per PT held ds   Neuro Re-Ed         scap retraction x20 x20 x30 home home home   UBE retro 10 min L4 10 min L4 8m L4 8 m L4 8 m L4 8m L4   T-band SHALONDA L4 3/10 L4 3/10 L4 3/10 L4 2/15 L4 2/15 L4 3/10   T-band tricep ext L4 3/10 L4 3/10 L4 3/10 L4 2/15 L4 2/15 L4 3/10   T-band row L4 3/10 L4 3/10 L4 3/10 L4 2/15 L4 2/15 L4 3/10   T-band IR L4 3/10 L4 3/10 L4 3/10 L4 3/10 L4 2/15 L4 3/10   T-band ER L4 3/10 L4 3/10 L4 3/10 L4 2/15 L4 2/15 L4 3/10   Ther Ex         Pendulums B/L 3# x30 3# x30 3# 2/15 3#   2/15 3# 2/15 3# 2/15   Doorway Stretch 4x 15" 4 x15" 4 x20" 4x 20" 4 x20" 4x 20"   Levator stretch 4x 15" 4 x15" 4x 20" 4x 20" 4 x20" 4x 20"   Upper trap str 4x 15" 4 x 15" 4x 20" 4x 20" 4 x20" 4x 20"   Standing T's/Y's 2/10 2/10 2/10 2/10 2/10 2/10   Serratus punches 3# 2/15 3# 2/15 3# 2/15 3# 2/10 3# 2/15 3# 2/15   SL abd/er 1# 2/10 2# L 1# R abd 2/15 2#  2/15 2# 2/15  1# 2/15 2# 2/15  both 1# 2/10   Rear Delts     10# 2/10    Ther Activity                           Modalities         CPw/ pre mod B/L shoulders 15 min 15 min 15 min 15 m 15 m 15m   US 3Mhz 1 0 W/cm R shoulder 10 10 min 10 min 10m 10 m 10m

## 2020-07-20 ENCOUNTER — OFFICE VISIT (OUTPATIENT)
Dept: PHYSICAL THERAPY | Facility: CLINIC | Age: 70
End: 2020-07-20
Payer: MEDICARE

## 2020-07-20 DIAGNOSIS — M47.22 OSTEOARTHRITIS OF SPINE WITH RADICULOPATHY, CERVICAL REGION: Primary | ICD-10-CM

## 2020-07-20 PROCEDURE — 97112 NEUROMUSCULAR REEDUCATION: CPT

## 2020-07-20 PROCEDURE — 97035 APP MDLTY 1+ULTRASOUND EA 15: CPT

## 2020-07-20 PROCEDURE — 97014 ELECTRIC STIMULATION THERAPY: CPT

## 2020-07-20 PROCEDURE — 97110 THERAPEUTIC EXERCISES: CPT

## 2020-07-20 PROCEDURE — 97140 MANUAL THERAPY 1/> REGIONS: CPT

## 2020-07-20 NOTE — PROGRESS NOTES
Daily Note     Today's date: 2020  Patient name: Jose R Woodard  : 1950  MRN: [id number]  Referring provider: Lit Turner DO  Dx:   Encounter Diagnosis     ICD-10-CM    1  Osteoarthritis of spine with radiculopathy, cervical region M47 22        Start Time: 1300  Stop Time: 1430  Total time in clinic (min): 90 minutes    Subjective: Pt states she continues with R< L shoulder pain  She is anticipating her MD F/U tomorrow  Objective: See treatment diary below  Added body blade today  Assessment: Tolerated treatment well  Patient exhibited good technique with therapeutic exercises      Plan: Continue per plan of care        Precautions: none  DATE: 7/1/20 7/6/20 7/8/20 7/13 7/15 7/20   Visit # 7 8 9 10 11 12   Pain 8 R 7 R 5 R w/ mov 7R sitting  5 w/ move L 0  R 5 L 0  R 6   Manuals         jt mobilization B/L shoulders [facility] ds ds ds   PROM B/L shoulders [facility] ds ds ds   STM C-Spine [facility] ds   Neuro Re-Ed         UBE retro 10 min L4 10 min L4 8m L4 8 m L4 8 m L4 8m L4   T-band SHALONDA L4 3/10 L4 3/10 L4 3/10 L4 2/15 L4 2/15 L4 3/10   T-band tricep ext L4 3/10 L4 3/10 L4 3/10 L4 2/15 L4 2/15 L4 3/10   T-band row L4 3/10 L4 3/10 L4 3/10 L4 2/15 L4 2/15 L4 3/10   T-band IR L4 3/10 L4 3/10 L4 3/10 L4 3/10 L4 2/15 L4 3/10   T-band ER L4 3/10 L4 3/10 L4 3/10 L4 2/15 L4 2/15 L4 3/10   Body Blade BL      30" x2   Ther Ex         Pendulums B/L 3# x30 3# x30 3# 2/15 3#   2/15 3# 2/15 3# 2/15   Doorway Stretch 4x 15" 4 x15" 4 x20" 4x 20" 4 x20" 4x 20"   Levator stretch 4x 15" 4 x15" 4x 20" 4x 20" 4 x20" 4x 20"   Upper trap str 4x 15" 4 x 15" 4x 20" 4x 20" 4 x20" 4x 20"   Standing T's/Y's 2/10 2/10 2/10 2/10 2/10 2/10   Serratus punches 3# 2/15 3# 2/15 3# 2/15 3# 2/10 3# 2/15 3# 2/15   SL abd/er 1# 2/10 2# L 1# R abd 2/15 2#  2/15 2# 2/15  1# 2/15 2# 2/15  both 2# 2/10   Rear Delts     10# 2/10 10# 2/10   Ther Activity                           Modalities         CPw/ pre mod B/L shoulders 15 min 15 min 15 min 15 m 15 m 15m   US 3Mhz 1 0 W/cm R shoulder 10 10 min 10 min 10m 10 m 10m

## 2020-07-21 ENCOUNTER — OFFICE VISIT (OUTPATIENT)
Dept: OBGYN CLINIC | Facility: CLINIC | Age: 70
End: 2020-07-21
Payer: MEDICARE

## 2020-07-21 VITALS
SYSTOLIC BLOOD PRESSURE: 174 MMHG | DIASTOLIC BLOOD PRESSURE: 89 MMHG | BODY MASS INDEX: 26.43 KG/M2 | WEIGHT: 174.4 LBS | HEART RATE: 86 BPM | HEIGHT: 68 IN | TEMPERATURE: 97.6 F

## 2020-07-21 DIAGNOSIS — M75.41 IMPINGEMENT SYNDROME OF RIGHT SHOULDER: Primary | ICD-10-CM

## 2020-07-21 DIAGNOSIS — M54.12 RADICULOPATHY, CERVICAL REGION: ICD-10-CM

## 2020-07-21 DIAGNOSIS — M75.42 IMPINGEMENT SYNDROME OF LEFT SHOULDER: ICD-10-CM

## 2020-07-21 PROCEDURE — 99213 OFFICE O/P EST LOW 20 MIN: CPT | Performed by: PHYSICIAN ASSISTANT

## 2020-07-21 PROCEDURE — 20610 DRAIN/INJ JOINT/BURSA W/O US: CPT | Performed by: PHYSICIAN ASSISTANT

## 2020-07-21 RX ORDER — METHYLPREDNISOLONE ACETATE 40 MG/ML
2 INJECTION, SUSPENSION INTRA-ARTICULAR; INTRALESIONAL; INTRAMUSCULAR; SOFT TISSUE
Status: COMPLETED | OUTPATIENT
Start: 2020-07-21 | End: 2020-07-21

## 2020-07-21 RX ORDER — BUPIVACAINE HYDROCHLORIDE 2.5 MG/ML
4 INJECTION, SOLUTION INFILTRATION; PERINEURAL
Status: COMPLETED | OUTPATIENT
Start: 2020-07-21 | End: 2020-07-21

## 2020-07-21 RX ORDER — MELOXICAM 15 MG/1
15 TABLET ORAL DAILY
Qty: 30 TABLET | Refills: 0 | Status: SHIPPED | OUTPATIENT
Start: 2020-07-21 | End: 2021-01-05

## 2020-07-21 RX ADMIN — BUPIVACAINE HYDROCHLORIDE 4 ML: 2.5 INJECTION, SOLUTION INFILTRATION; PERINEURAL at 14:29

## 2020-07-21 RX ADMIN — METHYLPREDNISOLONE ACETATE 2 ML: 40 INJECTION, SUSPENSION INTRA-ARTICULAR; INTRALESIONAL; INTRAMUSCULAR; SOFT TISSUE at 14:29

## 2020-07-21 NOTE — PROGRESS NOTES
ASSESSMENT/PLAN:    Diagnoses and all orders for this visit:    Impingement syndrome of right shoulder  -     MRI shoulder right wo contrast; Future  -     meloxicam (MOBIC) 15 mg tablet; Take 1 tablet (15 mg total) by mouth daily    Impingement syndrome of left shoulder  -     meloxicam (MOBIC) 15 mg tablet; Take 1 tablet (15 mg total) by mouth daily    Radiculopathy, cervical region  -     MRI cervical spine wo contrast; Future  -     meloxicam (MOBIC) 15 mg tablet; Take 1 tablet (15 mg total) by mouth daily    Other orders  -     Large joint arthrocentesis        The patient's right biceps tendon sheath was injected with Depo-Medrol and Marcaine  She tolerated the injection quite well  An MRI of her right shoulder was ordered to rule out a rotator cuff tear  An MRI of her cervical spine was ordered because of the radiculopathy she was experiencing  She was prescribed Mobic as well and should continue physical therapy  She is acceptable to this plan  The patient should follow up after the MRIs are completed  Return for after MRIs       _____________________________________________________  CHIEF COMPLAINT:  Chief Complaint   Patient presents with    Left Shoulder - Pain    Right Shoulder - Pain         SUBJECTIVE:  Jong Hampton is a 81 y o  female who presents to our office for a 6 week follow up  She has been going to physical therapy for bilateral shoulders and cervical neck pain  The patient states that her left shoulder is improved but her right shoulder is still giving her pain  She also complains of pain that radiates from the base of her neck down her right arm  She denies any fever or chills       The following portions of the patient's history were reviewed and updated as appropriate: allergies, current medications, past family history, past medical history, past social history, past surgical history and problem list     PAST MEDICAL HISTORY:  Past Medical History:   Diagnosis Date    Hypertension        PAST SURGICAL HISTORY:  Past Surgical History:   Procedure Laterality Date    KNEE SURGERY      WRIST SURGERY         FAMILY HISTORY:  Family History   Problem Relation Age of Onset    Heart disease Mother     Hypertension Mother     Hyperlipidemia Mother     Heart disease Father     Hypertension Father     Hyperlipidemia Father     Cancer Sister        SOCIAL HISTORY:  Social History     Tobacco Use    Smoking status: Never Smoker    Smokeless tobacco: Never Used   Substance Use Topics    Alcohol use: Yes     Comment: occasional    Drug use: Never       MEDICATIONS:    Current Outpatient Medications:     azithromycin (ZITHROMAX) 250 mg tablet, TAKE 2 TABLETS BY MOUTH TODAY, THEN TAKE 1 TABLET DAILY FOR 4 DAYS, Disp: , Rfl:     furosemide (LASIX) 20 mg tablet, Take 20 mg by mouth, Disp: , Rfl:     ibuprofen (MOTRIN) 800 mg tablet, TAKE ONE TABLET BY MOUTH THREE TIMES A DAY, Disp: , Rfl:     metoprolol succinate (TOPROL-XL) 25 mg 24 hr tablet, Take 25 mg by mouth daily, Disp: , Rfl:     omeprazole (PriLOSEC) 20 mg delayed release capsule, Take 20 mg by mouth as needed, Disp: , Rfl:     meloxicam (MOBIC) 15 mg tablet, Take 1 tablet (15 mg total) by mouth daily, Disp: 30 tablet, Rfl: 0    Current Facility-Administered Medications:     bupivacaine (PF) (MARCAINE) 0 5 % injection 6 mL, 6 mL, Injection, , Quince Alma Rosa, DO, 6 mL at 06/09/20 1405    bupivacaine (PF) (MARCAINE) 0 5 % injection 6 mL, 6 mL, Injection, , Quince Alma Rosa, DO, 6 mL at 06/09/20 1405    methylPREDNISolone acetate (DEPO-MEDROL) injection 2 mL, 2 mL, Intra-articular, , Quince Alma Rosa, DO, 2 mL at 06/09/20 1405    methylPREDNISolone acetate (DEPO-MEDROL) injection 2 mL, 2 mL, Intra-articular, , Quince Alma Rosa, DO, 2 mL at 06/09/20 1405    ALLERGIES:  Allergies   Allergen Reactions    Aspirin     Keflex [Cephalexin]     Levaquin [Levofloxacin]     Penicillins     Tetracycline        ROS:  Review of Systems Constitutional: Negative for fatigue, fever or loss of apetite  HENT: Negative  Respiratory: Negative for shortness of breath, dyspnea  Cardiovascular: Negative for chest pain/tightness  Gastrointestinal: Negative for abdominal pain, N/V  Endocrine: Negative for cold/heat intolerance, unexplained weight loss/gain  Genitourinary: Negative for flank pain, dysuria, hematuria  Musculoskeletal: Positive for arthralgia   Skin: Negative for rash  Neurological: Negative for numbness or tingling  Psychiatric/Behavioral: Negative for agitation  _____________________________________________________  PHYSICAL EXAMINATION:    Blood pressure (!) 174/89, pulse 86, temperature 97 6 °F (36 4 °C), height 5' 8" (1 727 m), weight 79 1 kg (174 lb 6 4 oz)  Constitutional: Oriented to person, place, and time  Appears well-developed and well-nourished  No distress  HENT:   Head: Normocephalic  Eyes: Conjunctivae are normal  Right eye exhibits no discharge  Left eye exhibits no discharge  No scleral icterus  Cardiovascular: Normal rate  Pulmonary/Chest: Effort normal    Neurological: Alert and oriented to person, place, and time  Skin: Skin is warm and dry  No rash noted  Not diaphoretic  No erythema  No pallor  Psychiatric: Normal mood and affect  Behavior is normal  Judgment and thought content normal       MUSCULOSKELETAL EXAMINATION:   Physical Exam  Ortho Exam    Cranial nerves 2-12 were intact  Neck was soft and supple  Negative axial compression test  Tenderness along the lower cervical spine  No neck spasticity  Mild tenderness and pain along the bilateral trapezius  Bilateral upper extremities are neurovascular intact  Fingers are pink and mobile  Compartments are soft  Bilateral shoulders with ROM of 110 ° of forward flexion abduction, internal rotation to L5  Mild signs of impingement  Rotator cuff strength 4/5 on right      Negative Tinel's and negative Phalen's test     Objective:  BP Readings from Last 1 Encounters:   07/21/20 (!) 174/89      Wt Readings from Last 1 Encounters:   07/21/20 79 1 kg (174 lb 6 4 oz)        BMI:   Estimated body mass index is 26 52 kg/m² as calculated from the following:    Height as of this encounter: 5' 8" (1 727 m)  Weight as of this encounter: 79 1 kg (174 lb 6 4 oz)  PROCEDURES PERFORMED:  Large joint arthrocentesis  Date/Time: 7/21/2020 2:29 PM  Consent given by: patient  Site marked: site marked  Timeout: Immediately prior to procedure a time out was called to verify the correct patient, procedure, equipment, support staff and site/side marked as required   Supporting Documentation  Indications: pain   Procedure Details  Location: Right biceps tendon sheath    Needle size: 22 G  Approach: lateral  Medications administered: 2 mL methylPREDNISolone acetate 40 mg/mL; 4 mL bupivacaine 0 25 %    Patient tolerance: patient tolerated the procedure well with no immediate complications  Dressing:  Sterile dressing applied            Raymond Tavares PA-C

## 2020-07-22 ENCOUNTER — OFFICE VISIT (OUTPATIENT)
Dept: PHYSICAL THERAPY | Facility: CLINIC | Age: 70
End: 2020-07-22
Payer: MEDICARE

## 2020-07-22 DIAGNOSIS — M75.42 IMPINGEMENT SYNDROME OF LEFT SHOULDER: ICD-10-CM

## 2020-07-22 DIAGNOSIS — M75.41 IMPINGEMENT SYNDROME OF RIGHT SHOULDER: ICD-10-CM

## 2020-07-22 DIAGNOSIS — M47.22 OSTEOARTHRITIS OF SPINE WITH RADICULOPATHY, CERVICAL REGION: Primary | ICD-10-CM

## 2020-07-22 PROCEDURE — 97035 APP MDLTY 1+ULTRASOUND EA 15: CPT | Performed by: PHYSICAL THERAPIST

## 2020-07-22 PROCEDURE — 97110 THERAPEUTIC EXERCISES: CPT | Performed by: PHYSICAL THERAPIST

## 2020-07-22 PROCEDURE — 97112 NEUROMUSCULAR REEDUCATION: CPT | Performed by: PHYSICAL THERAPIST

## 2020-07-22 PROCEDURE — 97014 ELECTRIC STIMULATION THERAPY: CPT | Performed by: PHYSICAL THERAPIST

## 2020-07-22 PROCEDURE — 97140 MANUAL THERAPY 1/> REGIONS: CPT | Performed by: PHYSICAL THERAPIST

## 2020-07-22 NOTE — PROGRESS NOTES
Daily Note     Today's date: 2020  Patient name: Yannick Jurado  : 1950  MRN: [id number]  Referring provider: Luca Warren DO  Dx:   Encounter Diagnosis     ICD-10-CM    1  Osteoarthritis of spine with radiculopathy, cervical region M47 22    2  Impingement syndrome of left shoulder M75 42    3  Impingement syndrome of right shoulder M75 41                   Subjective: Pt saw MD yesterday who ordered MRI of R shoulder and cervical spine and would like her to continue PT  Pain rated 3/10 R shoulder  Pt did note the past few days experiencing radicular symptoms from elbow to thumb  Objective: See treatment diary below  AROM R shoulder flex 145 pain-free, abd 150  Performed US at upper trap and cervical paraspinals as pt received injection yesterday  Assessment: Tolerated treatment well  Patient only noting discomfort at end range with PROM  Pt demonstrating increased AROM into flex and scaption  No reproduction of radicular symptoms into arm with cervical stretching and extension exercises  Pt did note soreness with IR stretching but overall symptoms and ROM are improved  Plan: Continue per plan of care        Precautions: none  DATE: 7/22/20 7/6/20 7/8/20 7/13 7/15 7/20   Visit # 13 8 9 10 11 12   Pain 3 R 7 R 5 R w/ mov 7R sitting  5 w/ move L 0  R 5 L 0  R 6   Manuals         jt mobilization B/L shoulders [facility] ds ds   PROM B/L shoulders [facility] ds ds   STM C-Spine [facility] ds   Neuro Re-Ed         UBE retro 10 min L4 10 min L4 8m L4 8 m L4 8 m L4 8m L4   T-band SHALONDA L4 3/10 L4 3/10 L4 3/10 L4 2/15 L4 2/15 L4 3/10   T-band tricep ext L4 3/10 L4 3/10 L4 3/10 L4 2/15 L4 2/15 L4 3/10   T-band row L4 3/10 L4 3/10 L4 3/10 L4 2/15 L4 2/15 L4 3/10   T-band IR L4 3/10 L4 3/10 L4 3/10 L4 3/10 L4 2/15 L4 3/10   T-band ER L4 3/10 L4 3/10 L4 3/10 L4 2/15 L4 /15 L4 3/10   Body Blade BL      30" x2   Ther Ex         Pendulums B/L 3# x30 3# x30 3# 2/15 3#   2/15 3# 2/15 3# 2/15 Doorway Stretch 4x 15" 4 x15" 4 x20" 4x 20" 4 x20" 4x 20"   Levator stretch 4x 15" 4 x15" 4x 20" 4x 20" 4 x20" 4x 20"   Upper trap str 4x 15" 4 x 15" 4x 20" 4x 20" 4 x20" 4x 20"   Standing T's/Y's 2/10 2/10 2/10 2/10 2/10 2/10   Serratus punches 3# 2/15 3# 2/15 3# 2/15 3# 2/10 3# 2/15 3# 2/15   SL abd/er 2# 2/15 2# L 1# R abd 2/15 2#  2/15 2# 2/15  1# 2/15 2# 2/15  both 2# 2/10   Rear Delts 10# 2/15    10# 2/10 10# 2/10   Ther Activity                           Modalities         CPw/ pre mod B/L shoulders 15 min 15 min 15 min 15 m 15 m 15m   US 3Mhz 1 0 W/cm R shoulder 10 min C-spine 10 min 10 min 10m 10 m 10m

## 2020-07-29 ENCOUNTER — OFFICE VISIT (OUTPATIENT)
Dept: PHYSICAL THERAPY | Facility: CLINIC | Age: 70
End: 2020-07-29
Payer: MEDICARE

## 2020-07-29 DIAGNOSIS — M75.41 IMPINGEMENT SYNDROME OF RIGHT SHOULDER: ICD-10-CM

## 2020-07-29 DIAGNOSIS — M47.22 OSTEOARTHRITIS OF SPINE WITH RADICULOPATHY, CERVICAL REGION: Primary | ICD-10-CM

## 2020-07-29 DIAGNOSIS — M75.42 IMPINGEMENT SYNDROME OF LEFT SHOULDER: ICD-10-CM

## 2020-07-29 PROCEDURE — 97140 MANUAL THERAPY 1/> REGIONS: CPT

## 2020-07-29 PROCEDURE — 97112 NEUROMUSCULAR REEDUCATION: CPT

## 2020-07-29 PROCEDURE — 97014 ELECTRIC STIMULATION THERAPY: CPT

## 2020-07-29 PROCEDURE — 97035 APP MDLTY 1+ULTRASOUND EA 15: CPT

## 2020-07-29 PROCEDURE — 97110 THERAPEUTIC EXERCISES: CPT

## 2020-07-29 NOTE — PROGRESS NOTES
Daily Note     Today's date: 2020  Patient name: Brigette Deras  : 1950  MRN: [id number]  Referring provider: Teresa Johnson DO  Dx:   Encounter Diagnosis     ICD-10-CM    1  Osteoarthritis of spine with radiculopathy, cervical region M47 22    2  Impingement syndrome of left shoulder M75 42    3  Impingement syndrome of right shoulder M75 41        Start Time: 1300  Stop Time: 1430  Total time in clinic (min): 90 minutes    Subjective: Pt states she received good relief after R anterior shoulder  last week  Objective: See treatment diary below  DC STM to neck per PT  Added SHALONDA with LF equipment, blackburn's per PT  Pt was provided with handouts of Blackburn's for HEP  Assessment: Tolerated treatment well  Patient exhibited good technique with therapeutic exercises      Plan: Continue per plan of care        Precautions: none  DATE: 7/22/20 7/29 7/8/20 7/13 7/15 7/20   Visit # 13 14 9 10 11 12   Pain 3 R 3 R 5 R w/ mov 7R sitting  5 w/ move L 0  R 5 L 0  R 6   Manuals         jt mobilization B/L shoulders [facility] ds ds ds   PROM B/L shoulders [facility] ds ds ds   Neuro Re-Ed         UBE retro 10 min L4 10 m L4 8m L4 8 m L4 8 m L4 8m L4   T-band SHALONDA L4 3/10 L5 2/15 L4 3/10 L4 2/15 L4 2/15 L4 3/10   T-band tricep ext L4 3/10 L5 2/15 L4 3/10 L4 2/15 L4 2/15 L4 3/10   T-band row L4 3/10 L5 2/15 L4 3/10 L4 2/15 L4 2/15 L4 3/10   T-band IR L4 3/10 L4 2/15 L4 3/10 L4 3/10 L4 2/15 L4 3/10   T-band ER L4 3/10 L4 3/10 L4 3/10 L4 2/15 L4 2/15 L4 3/10   Body Blade BL 30" 30"x2 ea    30" x2   Ther Ex         Pendulums B/L 3# x30 3# x30 3# 2/15 3#   2/15 3# 2/15 3# 2/15   Doorway Stretch 4x 15" 4 x15" 4 x20" 4x 20" 4 x20" 4x 20"   Levator stretch 4x 15" home 4x 20" 4x 20" 4 x20" 4x 20"   Upper trap str 4x 15" home 4x 20" 4x 20" 4 x20" 4x 20"   Standing T's/Y's 2/10 2/10 2/10 2/10 2/10 2/10   Serratus punches 3# 2/15 3# 2/15 3# 2/15 3# 2/10 3# 2/15 3# 2/15   SL abd/er 2# 2/15 3# 2/10 2#  2/15 2# 2/15  1# 2/15 2# 2/15  both 2# 2/10   [place] 1-5  10x ea                SHALONDA 10# 2/10        Rear Delts 10# 2/15 10# 2/15   10# 2/10 10# 2/10   Ther Activity                           Modalities         CPw/ pre mod B/L shoulders 15 m 15 m 15 min 15 m 15 m 15m   US 3Mhz 1 0 W/cm R shoulder 10m 10 m 10 min 10m 10 m 10m

## 2020-07-30 ENCOUNTER — OFFICE VISIT (OUTPATIENT)
Dept: PHYSICAL THERAPY | Facility: CLINIC | Age: 70
End: 2020-07-30
Payer: MEDICARE

## 2020-07-30 DIAGNOSIS — M47.22 OSTEOARTHRITIS OF SPINE WITH RADICULOPATHY, CERVICAL REGION: Primary | ICD-10-CM

## 2020-07-30 DIAGNOSIS — M75.42 IMPINGEMENT SYNDROME OF LEFT SHOULDER: ICD-10-CM

## 2020-07-30 DIAGNOSIS — M75.41 IMPINGEMENT SYNDROME OF RIGHT SHOULDER: ICD-10-CM

## 2020-07-30 PROCEDURE — 97035 APP MDLTY 1+ULTRASOUND EA 15: CPT

## 2020-07-30 PROCEDURE — 97140 MANUAL THERAPY 1/> REGIONS: CPT

## 2020-07-30 PROCEDURE — 97110 THERAPEUTIC EXERCISES: CPT

## 2020-07-30 PROCEDURE — 97112 NEUROMUSCULAR REEDUCATION: CPT

## 2020-07-30 PROCEDURE — 97014 ELECTRIC STIMULATION THERAPY: CPT

## 2020-07-30 NOTE — PROGRESS NOTES
Daily Note     Today's date: 2020  Patient name: Margarito Stock  : 1950  MRN: [id number]  Referring provider: Marian Patino DO  Dx:   Encounter Diagnosis     ICD-10-CM    1  Osteoarthritis of spine with radiculopathy, cervical region M47 22    2  Impingement syndrome of left shoulder M75 42    3  Impingement syndrome of right shoulder M75 41        Start Time: 1300  Stop Time: 1430  Total time in clinic (min): 90 minutes    Subjective: mild muscle soreness noted after change in her program last visit  Objective: See treatment diary below  Changed triceps ext to life fitness  Assessment: Tolerated treatment well  Patient exhibited good technique with therapeutic exercises      Plan: Progress note during next visit        Precautions: none  DATE: 7/22/20 7/29 7/30 7/13 7/15 7/20   Visit # 13 14 15 10 11 12   Pain 3 R 3 R  3R  7R sitting  5 w/ move L 0  R 5 L 0  R 6   Manuals         jt mobilization B/L shoulders  ds ds ds ds ds   PROM B/L shoulders  ds ds ds ds ds   Neuro Re-Ed         UBE retro 10 min L4 10 m L4 10m L4 8 m L4 8 m L4 8m L4   T-band SHALONDA L4 3/10 L5 2/15 L5 2/15 L4 2/15 L4 2/15 L4 3/10    Tricep ext L4 3/10 L5 2/15 LF 10#  2/15 L4 2/15 L4 2/15 L4 3/10   T-band row L4 3/10 L5 2/15 L5 2/15 L4 2/15 L4 2/15 L4 3/10   T-band IR L4 3/10 L4 2/15 L4 2/15 L4 3/10 L4 2/15 L4 3/10   T-band ER L4 3/10 L4 3/10 L4 2/15 L4 2/15 L4 2/15 L4 3/10   Body Blade BL 30" 30"x2 ea 30x 2xea   30" x2   Ther Ex         Pendulums B/L 3# x30 3# x30 3# 2/15 3#   2/15 3# 2/15 3# 2/15   Doorway Stretch 4x 15" 4 x15" 4 x20" 4x 20" 4 x20" 4x 20"   Standing T's/Y's 2/10 2/10 DC 2/10 2/10 2/10   Serratus punches 3# 2/15 3# 2/15 3# 2/15 3# 2/10 3# 2/15 3# 2/15   SL abd/er 2# 2/15 3# 2/10 3#  2/15 2# 2/15  1# 2/15 2# 2/15  both 2# 2/10   Blackburn 1-5  10x ea 10x ea      SHALONDA  10# 2/10 10# 2/15      Rear Delts 10# 2/15 10# 2/15 10# 2/15  10# 2/10 10# 2/10   Ther Activity                  Modalities         CPw/ pre mod B/L shoulders 15 m 15 m 15 m 15 m 15 m 15m   US 3Mhz 1 0 W/cm R shoulder 10m 10 m 10 m 10m 10 m 10m

## 2020-08-03 ENCOUNTER — OFFICE VISIT (OUTPATIENT)
Dept: PHYSICAL THERAPY | Facility: CLINIC | Age: 70
End: 2020-08-03
Payer: MEDICARE

## 2020-08-03 DIAGNOSIS — M75.41 IMPINGEMENT SYNDROME OF RIGHT SHOULDER: ICD-10-CM

## 2020-08-03 DIAGNOSIS — M75.42 IMPINGEMENT SYNDROME OF LEFT SHOULDER: ICD-10-CM

## 2020-08-03 DIAGNOSIS — M47.22 OSTEOARTHRITIS OF SPINE WITH RADICULOPATHY, CERVICAL REGION: Primary | ICD-10-CM

## 2020-08-03 PROCEDURE — 97035 APP MDLTY 1+ULTRASOUND EA 15: CPT | Performed by: PHYSICAL THERAPIST

## 2020-08-03 PROCEDURE — 97014 ELECTRIC STIMULATION THERAPY: CPT | Performed by: PHYSICAL THERAPIST

## 2020-08-03 PROCEDURE — 97140 MANUAL THERAPY 1/> REGIONS: CPT | Performed by: PHYSICAL THERAPIST

## 2020-08-03 PROCEDURE — 97112 NEUROMUSCULAR REEDUCATION: CPT | Performed by: PHYSICAL THERAPIST

## 2020-08-03 PROCEDURE — 97110 THERAPEUTIC EXERCISES: CPT | Performed by: PHYSICAL THERAPIST

## 2020-08-03 NOTE — PROGRESS NOTES
Daily Note     Today's date: 8/3/2020  Patient name: Aniya John  : 1950  MRN: [id number]  Referring provider: Aba Sheppard DO  Dx:   Encounter Diagnosis     ICD-10-CM    1  Osteoarthritis of spine with radiculopathy, cervical region  M47 22    2  Impingement syndrome of left shoulder  M75 42    3  Impingement syndrome of right shoulder  M75 41                   Subjective: Pt notes R shoulder pain is 2-3/10 today  No L shoulder pain noted  Objective: See treatment diary below  PROM R shoulder flex 175 pulling at end-range, abd 175 minimal to no discomfort, ER 85 no discomfort  Assessment: Tolerated treatment well  Patient with no discomfort during TE  Pt continues to demonstrate increased pain-free ROM actively and passively at R shoulder  Pt continues to demonstrate good tolerance for TE with L UE with no pain noted  Fatigue reported post session  Plan: Continue per plan of care        Precautions: none  DATE: 7/22/20 7/29 7/30 8/3 7/15 7/20   Visit # 13 14 15 16 11 12   Pain 3 R 3 R  3R  3R  L 0  R 5 L 0  R 6   Manuals         jt mobilization B/L shoulders  ds ds  ds ds   PROM B/L shoulders  ds ds  ds ds   Neuro Re-Ed         UBE retro 10 min L4 10 m L4 10m L4 10 m L4 8 m L4 8m L4   T-band SHALONDA L4 3/10 L5 2/15 L5 2/15 L5 2/15 L4 2/15 L4 3/10    Tricep ext L4 3/10 L5 2/15 LF 10#  2/15 LF 10# 2/15 L4 2/15 L4 3/10   T-band row L4 3/10 L5 2/15 L5 2/15 L5 2/15 L4 2/15 L4 3/10   T-band IR L4 3/10 L4 2/15 L42/15 L5 3/10 L4 2/15 L4 3/10   T-band ER L4 3/10 L4 3/10 L4 2/15 L5 2/15 L4 2/15 L4 3/10   Body Blade BL 30" 30"x2 ea 30x 2xea 30"x 2ea  30" x2   Ther Ex         Pendulums B/L 3# x30 3# x30 3# 2/15 3#   2/15 3# 2/15 3# 2/15   Doorway Stretch 4x 15" 4 x15" 4 x20" 4x 20" 4 x20" 4x 20"   Standing T's/Y's 2/10 2/10 DC DC 2/10 2/10   Serratus punches 3# 2/15 3# 2/15 3# 2/15 3# 2/10 3# 2/15 3# 2/15   SL abd/er 2# 2/15 3# 2/10 3#  2/15 3# 2/15  3# 2/15 2# 2/15  both 2# 2/10   Blackburn 1-5 10x ea 10x ea x10 ea     SHALONDA  10# 2/10 10# 2/15 10# 2/15     Rear Delts 10# 2/15 10# 2/15 10# 2/15 10# 2/15 10# 2/10 10# 2/10   Ther Activity                  Modalities         CPw/ pre mod B/L shoulders 15 m 15 m 15 m 15 m 15 m 15m   US 3Mhz 1 0 W/cm R shoulder 10m 10 m 10 m 10m 10 m 10m

## 2020-08-05 ENCOUNTER — OFFICE VISIT (OUTPATIENT)
Dept: PHYSICAL THERAPY | Facility: CLINIC | Age: 70
End: 2020-08-05
Payer: MEDICARE

## 2020-08-05 DIAGNOSIS — M75.42 IMPINGEMENT SYNDROME OF LEFT SHOULDER: ICD-10-CM

## 2020-08-05 DIAGNOSIS — M47.22 OSTEOARTHRITIS OF SPINE WITH RADICULOPATHY, CERVICAL REGION: Primary | ICD-10-CM

## 2020-08-05 DIAGNOSIS — M75.41 IMPINGEMENT SYNDROME OF RIGHT SHOULDER: ICD-10-CM

## 2020-08-05 PROCEDURE — 97110 THERAPEUTIC EXERCISES: CPT

## 2020-08-05 PROCEDURE — 97112 NEUROMUSCULAR REEDUCATION: CPT

## 2020-08-05 PROCEDURE — 97010 HOT OR COLD PACKS THERAPY: CPT

## 2020-08-05 PROCEDURE — 97014 ELECTRIC STIMULATION THERAPY: CPT

## 2020-08-05 PROCEDURE — 97035 APP MDLTY 1+ULTRASOUND EA 15: CPT

## 2020-08-05 PROCEDURE — 97140 MANUAL THERAPY 1/> REGIONS: CPT

## 2020-08-05 NOTE — PROGRESS NOTES
Daily Note     Today's date: 2020  Patient name: Zulay Parry  : 1950  MRN: [id number]  Referring provider: Uche Munoz DO  Dx:   Encounter Diagnosis     ICD-10-CM    1  Osteoarthritis of spine with radiculopathy, cervical region  M47 22    2  Impingement syndrome of left shoulder  M75 42    3  Impingement syndrome of right shoulder  M75 41        Start Time: 1000  Stop Time: 1130  Total time in clinic (min): 90 minutes    Subjective: Patient reported less pain and discomfort since last visit ; 2/10 pain  She returns to MD tomorrow, 2020  Objective: PTA directed patient in UE strengthening and ROM program, See treatment diary below  Assessment: Tolerated treatment well  Patient noted increased tightness in L shoulder > R  Completed program without increase in pain , and demonstrated good exercise technique  Plan:  Will await recommendations from MD        Precautions: none  DATE: 7/22/20 7/29 7/30 8/3 8/5 7/20   Visit # 13 14 15 16 17 12   Pain 3 R 3 R  3R  3R  L 0  R 2 L 0  R 6   Manuals         jt mobilization B/L shoulders  ds ds JH N/A ds   PROM B/L shoulders  ds ds JH KW ds   Neuro Re-Ed         UBE retro 10 min L4 10 m L4 10m L4 10 m L4 10 m L4 8m L4   T-band SHALONDA L4 3/10 L5 2/15 L5 2/15 L5 2/15 L5 2/15 L4 3/10    Tricep ext L4 3/10 L5 2/15 LF 10#  2/15 LF 10# 2/15 LF 10# 2/15 L4 3/10   T-band row L4 3/10 L5 2/15 L5 2/15 L5 2/15 L5 2/15 L4 3/10   T-band IR L4 3/10 L4 2/15 L42/15 L5 3/10 L5 3/10 L4 3/10   T-band ER L4 3/10 L4 3/10 L4 2/15 L5 2/15 L5 3/10 L4 3/10   Body Blade BL 30" 30"x2 ea 30x 2xea 30"x 2ea 30" x2 ea 30" x2   Ther Ex         Pendulums B/L 3# x30 3# x30 3# 2/15 3#   2/15 3# 2/15 3# 2/15   Doorway Stretch 4x 15" 4 x15" 4 x20" 4x 20" 4x 20" 4x 20"   Standing T's/Y's 2/10 2/10 DC DC D/C 2/10   Serratus punches 3# 2/15 3# 2/15 3# 2/15 3# 2/10 3# 2/10 3# 2/15   SL abd/er 2# 2/15 3# 2/10 3#  2/15 3# 2/15  3# 2/15 3# 2/15 each 2# 2/10   Blackburn 1-5  10x ea 10x ea x10 ea x10 each     SHALONDA  10# 2/10 10# 2/15 10# 2/15 10# 2/15    Rear Delts 10# 2/15 10# 2/15 10# 2/15 10# 2/15  10# 2/15 10# 2/10   Ther Activity                  Modalities         CPw/ pre mod B/L shoulders 15 m 15 m 15 m 15 m 15m  15m   US 3Mhz 1 0 W/cm R shoulder 10m 10 m 10 m 10m 10m  10m

## 2020-08-06 ENCOUNTER — OFFICE VISIT (OUTPATIENT)
Dept: OBGYN CLINIC | Facility: CLINIC | Age: 70
End: 2020-08-06
Payer: MEDICARE

## 2020-08-06 VITALS
BODY MASS INDEX: 26.37 KG/M2 | HEIGHT: 68 IN | HEART RATE: 77 BPM | TEMPERATURE: 97.5 F | WEIGHT: 174 LBS | DIASTOLIC BLOOD PRESSURE: 86 MMHG | SYSTOLIC BLOOD PRESSURE: 157 MMHG

## 2020-08-06 DIAGNOSIS — M75.41 IMPINGEMENT SYNDROME OF RIGHT SHOULDER: Primary | ICD-10-CM

## 2020-08-06 DIAGNOSIS — M54.12 RADICULOPATHY, CERVICAL REGION: ICD-10-CM

## 2020-08-06 PROCEDURE — 99213 OFFICE O/P EST LOW 20 MIN: CPT | Performed by: PHYSICIAN ASSISTANT

## 2020-08-06 NOTE — PROGRESS NOTES
ASSESSMENT/PLAN:    Diagnoses and all orders for this visit:    Impingement syndrome of right shoulder    Radiculopathy, cervical region        She has been doing well since her right biceps tendon sheath was injected with Depo-Medrol and Marcaine last visit  She states that her pain in her shoulder and neck have improved  MRI shows no shoulder pathology, however, she has a lot of degenerative disc disease in her cervical spine  The patient does not want to go to pain management at this time, as her pain has improved  She has been taking meloxicam to as an anti-inflammatory  She states she has finished with physical therapy  She should continue a home exercise program   She will follow up with our office in 3 months  No follow-ups on file       _____________________________________________________  CHIEF COMPLAINT:  Chief Complaint   Patient presents with    Neck - Follow-up    Left Shoulder - Follow-up         SUBJECTIVE:  Dariana Spaulding is a 78 y o  female who presents to our office for a follow-up visit  The patient had MRIs performed of her right shoulder and cervical spine  She states that her pain has improved  She denies any numbness or tingling  She denies any fever or chills       The following portions of the patient's history were reviewed and updated as appropriate: allergies, current medications, past family history, past medical history, past social history, past surgical history and problem list     PAST MEDICAL HISTORY:  Past Medical History:   Diagnosis Date    Hypertension        PAST SURGICAL HISTORY:  Past Surgical History:   Procedure Laterality Date    KNEE SURGERY      WRIST SURGERY         FAMILY HISTORY:  Family History   Problem Relation Age of Onset    Heart disease Mother     Hypertension Mother     Hyperlipidemia Mother     Heart disease Father     Hypertension Father     Hyperlipidemia Father     Cancer Sister        SOCIAL HISTORY:  Social History     Tobacco Use    Smoking status: Never Smoker    Smokeless tobacco: Never Used   Substance Use Topics    Alcohol use: Yes     Comment: occasional    Drug use: Never       MEDICATIONS:    Current Outpatient Medications:     azithromycin (ZITHROMAX) 250 mg tablet, TAKE 2 TABLETS BY MOUTH TODAY, THEN TAKE 1 TABLET DAILY FOR 4 DAYS, Disp: , Rfl:     furosemide (LASIX) 20 mg tablet, Take 20 mg by mouth, Disp: , Rfl:     ibuprofen (MOTRIN) 800 mg tablet, TAKE ONE TABLET BY MOUTH THREE TIMES A DAY, Disp: , Rfl:     meloxicam (MOBIC) 15 mg tablet, Take 1 tablet (15 mg total) by mouth daily, Disp: 30 tablet, Rfl: 0    metoprolol succinate (TOPROL-XL) 25 mg 24 hr tablet, Take 25 mg by mouth daily, Disp: , Rfl:     omeprazole (PriLOSEC) 20 mg delayed release capsule, Take 20 mg by mouth as needed, Disp: , Rfl:     Current Facility-Administered Medications:     bupivacaine (PF) (MARCAINE) 0 5 % injection 6 mL, 6 mL, Injection, , Bibiana Schimke, DO, 6 mL at 06/09/20 1405    bupivacaine (PF) (MARCAINE) 0 5 % injection 6 mL, 6 mL, Injection, , Bibiana Schimke, DO, 6 mL at 06/09/20 1405    methylPREDNISolone acetate (DEPO-MEDROL) injection 2 mL, 2 mL, Intra-articular, , Bibiana Schimke, DO, 2 mL at 06/09/20 1405    methylPREDNISolone acetate (DEPO-MEDROL) injection 2 mL, 2 mL, Intra-articular, , Bibiana Schimke, DO, 2 mL at 06/09/20 1405    ALLERGIES:  Allergies   Allergen Reactions    Aspirin     Keflex [Cephalexin]     Levaquin [Levofloxacin]     Penicillins     Tetracycline        ROS:  Review of Systems     Constitutional: Negative for fatigue, fever or loss of apetite  HENT: Negative  Respiratory: Negative for shortness of breath, dyspnea  Cardiovascular: Negative for chest pain/tightness  Gastrointestinal: Negative for abdominal pain, N/V  Endocrine: Negative for cold/heat intolerance, unexplained weight loss/gain  Genitourinary: Negative for flank pain, dysuria, hematuria     Musculoskeletal: Positive for arthralgia Skin: Negative for rash  Neurological: Negative for numbness or tingling  Psychiatric/Behavioral: Negative for agitation  _____________________________________________________  PHYSICAL EXAMINATION:    Blood pressure 157/86, pulse 77, temperature 97 5 °F (36 4 °C), height 5' 8" (1 727 m), weight 78 9 kg (174 lb)  Constitutional: Oriented to person, place, and time  Appears well-developed and well-nourished  No distress  HENT:   Head: Normocephalic  Eyes: Conjunctivae are normal  Right eye exhibits no discharge  Left eye exhibits no discharge  No scleral icterus  Cardiovascular: Normal rate  Pulmonary/Chest: Effort normal    Neurological: Alert and oriented to person, place, and time  Skin: Skin is warm and dry  No rash noted  Not diaphoretic  No erythema  No pallor  Psychiatric: Normal mood and affect  Behavior is normal  Judgment and thought content normal       MUSCULOSKELETAL EXAMINATION:   Physical Exam  Ortho Exam      Objective:  BP Readings from Last 1 Encounters:   08/06/20 157/86      Wt Readings from Last 1 Encounters:   08/06/20 78 9 kg (174 lb)        BMI:   Estimated body mass index is 26 46 kg/m² as calculated from the following:    Height as of this encounter: 5' 8" (1 727 m)  Weight as of this encounter: 78 9 kg (174 lb)  Radiographs:  _____________________________________________________  STUDIES REVIEWED:  I have personally reviewed pertinent films and reports in PACS  MRI of the cervical spine shows multilevel degenerative disc changes predominating at C6-C7  She has some degenerative changes along C4-C5 and C5-C6  She also has some herniations with osteophytes  MRI of the the shoulder shows no rotator cuff tear    There is mucoid degeneration of the superior labrum      Tiny Mendoza PA-C

## 2020-08-11 ENCOUNTER — OFFICE VISIT (OUTPATIENT)
Dept: PHYSICAL THERAPY | Facility: CLINIC | Age: 70
End: 2020-08-11
Payer: MEDICARE

## 2020-08-11 DIAGNOSIS — M47.22 OSTEOARTHRITIS OF SPINE WITH RADICULOPATHY, CERVICAL REGION: Primary | ICD-10-CM

## 2020-08-11 DIAGNOSIS — M75.42 IMPINGEMENT SYNDROME OF LEFT SHOULDER: ICD-10-CM

## 2020-08-11 DIAGNOSIS — M75.41 IMPINGEMENT SYNDROME OF RIGHT SHOULDER: ICD-10-CM

## 2020-08-11 PROCEDURE — 97110 THERAPEUTIC EXERCISES: CPT | Performed by: PHYSICAL THERAPIST

## 2020-08-11 PROCEDURE — 97014 ELECTRIC STIMULATION THERAPY: CPT | Performed by: PHYSICAL THERAPIST

## 2020-08-11 PROCEDURE — 97140 MANUAL THERAPY 1/> REGIONS: CPT | Performed by: PHYSICAL THERAPIST

## 2020-08-11 PROCEDURE — 97112 NEUROMUSCULAR REEDUCATION: CPT | Performed by: PHYSICAL THERAPIST

## 2020-08-11 NOTE — PROGRESS NOTES
PT Discharge    Today's date: 2020  Patient name: Quincy Horner  : 1950  MRN: [id number]  Referring provider: Stacy Lucia DO  Dx:   Encounter Diagnosis     ICD-10-CM    1  Osteoarthritis of spine with radiculopathy, cervical region  M47 22    2  Impingement syndrome of left shoulder  M75 42    3  Impingement syndrome of right shoulder  M75 41                   Assessment  Assessment details: Pt is a 81year old female presenting to Outpatient PT with chief complaint of neck stiffness and soreness with bilateral shoulder pain radiating to lateral arm  Pt seen for 18 visits of Outpatient PT with treatment consisting of US to R shoulder, PROM joint mobilization, soft tissue mobilization with manual stretching to C-Spine, scapular stabilization postural correction and stretching exercises with HEP  Pt has made excellent progress with therapy  Pt has demonstrated improvement in pain free ROM bilateral shoulders  Pt demonstrated postural correction with decreased shoulder positioning and increased postural awareness  Pt with strength gains bilateral shoulders with improved tolerance for all functional use of bilateral UE's including with lifting dressing and multidirectional reaching  Pt also with improved sleep tolerance with less waking due to pain  Pt returned to MD who recommended discharge from PT with transition to HEP  Pt has completed POC  Impairments: activity intolerance, impaired physical strength and pain with function    Symptom irritability: lowUnderstanding of Dx/Px/POC: good   Prognosis: good    Goals  STG's to be met in 3-4 weeks:  1  Decrease pain with activity by 25% bilateral shoulders- met  2  Improve ROM bilateral shoulders by 5-10 degrees- met  3  Improve bilateral UE strength by 3-5 lbs all motions tested-  met  4  Promote postural correction to decrease impingement- met  5  Pt will perform all ADL's and dressing pain-free- met  6   Initiate HEP of stretching postural correction and scapular strengthening- met    LTG's to be met in 7-8 weeks:  1  Decrease bilateral shoulder pain with activity to 0-2/10- met    2  Maximize bilateral shoulder ROM needed for performance of IADL's- met   3  Improve bilateral shoulder strength by 8 lbs all motions- progressed not met  4  Pt will be able to reach to and above shoulder level pain free- met  5  Pt will perform all ADL's and IADL's pain free- met  6  Improve sleep tolerance to waking less than 2 nights per week-  met  7  Pt will be able to reach behind back or behind seat in car without discomfort- met L not met on R  8  Improve Foto score to at least 65- not met 61    Plan  Plan details: D/C to HEP  Planned therapy interventions: home exercise program  Plan of Care beginning date: 2020  Plan of Care expiration date: 2020  Treatment plan discussed with: patient        Subjective Evaluation    History of Present Illness  Date of onset: 2020  Mechanism of injury: Pt reports L shoulder is pretty good  Pt has seen good gains with L shoulder  Pt notes sleeping is still difficult with regard to R shoulder  Pain persists at R shoulder worse in the morning but decreases with exercises and use  Pt is always present at R shoulder   Pt does report decreased symptoms at R neck          Not a recurrent problem   Quality of life: good    Pain  Current pain ratin  At best pain ratin  At worst pain ratin  Location: R shoulder  Relieving factors: ice and medications  Aggravating factors: lifting and overhead activity    Treatments  Current treatment: injection treatment and physical therapy  Patient Goals  Patient goals for therapy: decreased pain, increased motion, increased strength, independence with ADLs/IADLs and return to sport/leisure activities          Objective     Observations     Additional Observation Details  Mild forward shoulder positioning and forward head posture in sitting  Equal shoulder resting height, symmetrical scapular positioning    Palpation     Additional Palpation Details  Moderate tightness cervical paraspinals and bilateral supraspinatus muscles- decreased to minimal, trigger point still noted R levator  Moderate tenderness to palpation bilateral supraspinatus tendons- no tenderness L supraspinatus, R remains moderate    Cervical/Thoracic Screen   Cervical range of motion within normal limits    Neurological Testing     Sensation     Shoulder   Left Shoulder   Intact: light touch    Right Shoulder   Intact: light touch    Active Range of Motion   Left Shoulder   Flexion: 170 degrees   Abduction: 180 degrees   External rotation 90°: 99 degrees WFL  External rotation BTH: T4 WFL  Internal rotation 90°: 78 degrees   Internal rotation BTB: T8     Right Shoulder   Flexion: 170 degrees   Abduction: 180 degrees   External rotation 90°: 88 degreesWFL  External rotation BTH: T4 WFL  Internal rotation 90°: 80 degrees   Internal rotation BTB: T10     Passive Range of Motion     Right Shoulder   Flexion: 175 degrees   Abduction: 180 degrees     Strength/Myotome Testing     Additional Strength Details  Shoulder                  R                 L  Flex                    25lbs              25 lbs  Abd                     18 lbs              25 lbs  IR                        18 lbs             25lbs  ER                      18 lbs            15 lbs    Elbow  Flex                     30 lbs            25 lbs  Ext                       23 lbs           25 lbs                         45 lbs            40 lbs    Tests     Additional Tests Details  (+) Valora Simmer bilaterally - (-) on Left, still (-) on Right  (+) Empty Can with pain and weakness- (-) on Left still (-) on Right    General Comments:      Shoulder Comments   Increased pain with reaching to and above shoulder level-decreased pain with reaching to and above S' level, no pain with L UE  Poor sleep tolerance waking nightly due to pain- pt still wakes nightly due to R shoulder pain  Limited ability to perform dressing and ADL's due to pain- pt still reports modification for dressing but still (I)  Difficulty with prolonged overhead activity- improved but still pain with prolonged overhead activity  Poor tolerance for reaching behind back or to back seat of car- still poor with right, no difficulty with Left  Lifting tolerance 8 lbs to shoulder level- Improved to 10 lbs  Foto- 53- increased to 61             Precautions: none  DATE: 7/22/20 7/29 7/30 8/3 8/5 8/11   Visit # 13 14 15 16 17 18   Pain 3 R 3 R  3R  3R  L 0  R 2 L 0  R 2   Manuals         jt mobilization B/L shoulders  ds ds  N/A JH   PROM B/L shoulders  ds ds Mary Ellen YODER    Neuro Re-Ed         UBE retro 10 min L4 10 m L4 10m L4 10 m L4 10 m L4 10m L4   T-band SHALONDA L4 3/10 L5 2/15 L5 2/15 L5 2/15 L5 2/15 L5 3/10    Tricep ext L4 3/10 L5 2/15 LF 10#  2/15 LF 10# 2/15 LF 10# 2/15 LF 10# 2/15   T-band row L4 3/10 L5 2/15 L5 2/15 L5 2/15 L5 2/15 L5 3/10   T-band IR L4 3/10 L4 2/15 L42/15 L5 3/10 L5 3/10 L5 3/10   T-band ER L4 3/10 L4 3/10 L4 2/15 L5 2/15 L5 3/10 L4 3/10   Body Blade BL 30" 30"x2 ea 30x 2xea 30"x 2ea 30" x2 ea 30" x2   Ther Ex         Pendulums B/L 3# x30 3# x30 3# 2/15 3#   2/15 3# 2/15 DC   Doorway Stretch 4x 15" 4 x15" 4 x20" 4x 20" 4x 20" 4x 20"   Standing T's/Y's 2/10 2/10 DC DC D/C DC   Serratus punches 3# 2/15 3# 2/15 3# 2/15 3# 2/10 3# 2/10 3# 2/15   SL abd/er 2# 2/15 3# 2/10 3#  2/15 3# 2/15  3# 2/15 3# 2/15 each 3# 2/15   Blackburn 1-5  10x ea 10x ea x10 ea x10 each  x10 ea   SHALONDA  10# 2/10 10# 2/15 10# 2/15 10# 2/15 15# 2/15   Rear Delts 10# 2/15 10# 2/15 10# 2/15 10# 2/15  10# 2/15 15# 2/15   Ther Activity                  Modalities         CPw/ pre mod B/L shoulders 15 m 15 m 15 m 15 m 15m  15m   US 3Mhz 1 0 W/cm R shoulder 10m 10 m 10 m 10m 10m  DC

## 2020-08-11 NOTE — LETTER
2020    Pastor Suzy DO  [address]   [address] [facility]    Patient: Aniya John   YOB: 1950   Date of Visit: 2020     Encounter Diagnosis     ICD-10-CM    1  Osteoarthritis of spine with radiculopathy, cervical region  M47 22    2  Impingement syndrome of left shoulder  M75 42    3  Impingement syndrome of right shoulder  M75 41        Dear Dr Joshua Macedo:    Thank you for your recent referral of Aniya John  Please review the attached evaluation summary from  [facility] recent visit  Please verify that you agree with the plan of care by signing the attached order  If you have any questions or concerns, please do not hesitate to call  I sincerely appreciate the opportunity to share in the care of one of your patients and hope to have another opportunity to work with you in the near future  Sincerely,    Carmela Carrera, PT      Referring Provider:      I certify that I have read the below Plan of Care and certify the need for these services furnished under this plan of treatment while under my care  Pastor Suzy DO  [address] [facility] [address]  [address] [facility]          PT Discharge    Today's date: 2020  Patient name: Aniya John  : 1950  MRN: [id number]  Referring provider: Aba Sheppard DO  Dx:   Encounter Diagnosis     ICD-10-CM    1  Osteoarthritis of spine with radiculopathy, cervical region  M47 22    2  Impingement syndrome of left shoulder  M75 42    3  Impingement syndrome of right shoulder  M75 41                   Assessment  Assessment details: Pt is a 81year old female presenting to Outpatient PT with chief complaint of neck stiffness and soreness with bilateral shoulder pain radiating to lateral arm   Pt seen for 18 visits of Outpatient PT with treatment consisting of US to R shoulder, PROM joint mobilization, soft tissue mobilization with manual stretching to C-Spine, scapular stabilization postural correction and stretching exercises with HEP  Pt has made excellent progress with therapy  Pt has demonstrated improvement in pain free ROM bilateral shoulders  Pt demonstrated postural correction with decreased shoulder positioning and increased postural awareness  Pt with strength gains bilateral shoulders with improved tolerance for all functional use of bilateral UE's including with lifting dressing and multidirectional reaching  Pt also with improved sleep tolerance with less waking due to pain  Pt returned to MD who recommended discharge from PT with transition to HEP  Pt has completed POC  Impairments: activity intolerance, impaired physical strength and pain with function    Symptom irritability: lowUnderstanding of Dx/Px/POC: good   Prognosis: good    Goals  STG's to be met in 3-4 weeks:  1  Decrease pain with activity by 25% bilateral shoulders- met  2  Improve ROM bilateral shoulders by 5-10 degrees- met  3  Improve bilateral UE strength by 3-5 lbs all motions tested-  met  4  Promote postural correction to decrease impingement- met  5  Pt will perform all ADL's and dressing pain-free- met  6  Initiate HEP of stretching postural correction and scapular strengthening- met    LTG's to be met in 7-8 weeks:  1  Decrease bilateral shoulder pain with activity to 0-2/10- met    2  Maximize bilateral shoulder ROM needed for performance of IADL's- met   3  Improve bilateral shoulder strength by 8 lbs all motions- progressed not met  4  Pt will be able to reach to and above shoulder level pain free- met  5  Pt will perform all ADL's and IADL's pain free- met  6  Improve sleep tolerance to waking less than 2 nights per week-  met  7  Pt will be able to reach behind back or behind seat in car without discomfort- met L not met on R  8   Improve Foto score to at least 65- not met 61    Plan  Plan details: D/C to HEP  Planned therapy interventions: home exercise program  Plan of Care beginning date: 2020  Plan of Care expiration date: 2020  Treatment plan discussed with: patient        Subjective Evaluation    History of Present Illness  Date of onset: 2020  Mechanism of injury: Pt reports L shoulder is pretty good  Pt has seen good gains with L shoulder  Pt notes sleeping is still difficult with regard to R shoulder  Pain persists at R shoulder worse in the morning but decreases with exercises and use  Pt is always present at R shoulder   Pt does report decreased symptoms at R neck          Not a recurrent problem   Quality of life: good    Pain  Current pain ratin  At best pain ratin  At worst pain ratin  Location: R shoulder  Relieving factors: ice and medications  Aggravating factors: lifting and overhead activity    Treatments  Current treatment: injection treatment and physical therapy  Patient Goals  Patient goals for therapy: decreased pain, increased motion, increased strength, independence with ADLs/IADLs and return to sport/leisure activities          Objective     Observations     Additional Observation Details  Mild forward shoulder positioning and forward head posture in sitting  Equal shoulder resting height, symmetrical scapular positioning    Palpation     Additional Palpation Details  Moderate tightness cervical paraspinals and bilateral supraspinatus muscles- decreased to minimal, trigger point still noted R levator  Moderate tenderness to palpation bilateral supraspinatus tendons- no tenderness L supraspinatus, R remains moderate    Cervical/Thoracic Screen   Cervical range of motion within normal limits    Neurological Testing     Sensation     Shoulder   Left Shoulder   Intact: light touch    Right Shoulder   Intact: light touch    Active Range of Motion   Left Shoulder   Flexion: 170 degrees   Abduction: 180 degrees   External rotation 90°: 99 degrees WFL  External rotation BTH: T4 WFL  Internal rotation 90°: 78 degrees   Internal rotation BTB: T8     Right Shoulder   Flexion: 170 degrees   Abduction: 180 degrees   External rotation 90°: 88 degreesWFL  External rotation BTH: T4 WFL  Internal rotation 90°: 80 degrees   Internal rotation BTB: T10     Passive Range of Motion     Right Shoulder   Flexion: 175 degrees   Abduction: 180 degrees     Strength/Myotome Testing     Additional Strength Details  Shoulder                  R                 L  Flex                    25lbs              25 lbs  Abd                     18 lbs              25 lbs  IR                        18 lbs             25lbs  ER                      18 lbs            15 lbs    Elbow  Flex                     30 lbs            25 lbs  Ext                       23 lbs           25 lbs                         45 lbs            40 lbs    Tests     Additional Tests Details  (+) King Gist bilaterally - (-) on Left, still (-) on Right  (+) Empty Can with pain and weakness- (-) on Left still (-) on Right    General Comments:      Shoulder Comments   Increased pain with reaching to and above shoulder level-decreased pain with reaching to and above S' level, no pain with L UE  Poor sleep tolerance waking nightly due to pain- pt still wakes nightly due to R shoulder pain  Limited ability to perform dressing and ADL's due to pain- pt still reports modification for dressing but still (I)  Difficulty with prolonged overhead activity- improved but still pain with prolonged overhead activity  Poor tolerance for reaching behind back or to back seat of car- still poor with right, no difficulty with Left  Lifting tolerance 8 lbs to shoulder level- Improved to 10 lbs  Foto- 53- increased to 61             Precautions: none  DATE: 7/22/20 7/29 7/30 8/3 8/5 8/11   Visit # 13 14 15 16 17 18   Pain 3 R 3 R  3R  3R  L 0  R 2 L 0  R 2   Manuals         jt mobilization B/L shoulders  mic haile  N/A    PROM B/L shoulders  mic haile [facility]   Neuro Re-Ed         UBE retro 10 min L4 10 m L4 10m L4 10 m L4 10 m L4 10m L4   T-band SHALONDA L4 3/10 L5 2/15 L5 2/15 L5 2/15 L5 2/15 L5 3/10    Tricep ext L4 3/10 L5 2/15 LF 10#  2/15 LF 10# 2/15 LF 10# 2/15 LF 10# 2/15   T-band row L4 3/10 L5 2/15 L5 2/15 L5 2/15 L5 2/15 L5 3/10   T-band IR L4 3/10 L4 2/15 L42/15 L5 3/10 L5 3/10 L5 3/10   T-band ER L4 3/10 L4 3/10 L4 2/15 L5 2/15 L5 3/10 L4 3/10   Body Blade BL 30" 30"x2 ea 30x 2xea 30"x 2ea 30" x2 ea 30" x2   Ther Ex         Pendulums B/L 3# x30 3# x30 3# 2/15 3#   2/15 3# 2/15 DC   Doorway Stretch 4x 15" 4 x15" 4 x20" 4x 20" 4x 20" 4x 20"   Standing T's/Y's 2/10 2/10 DC DC D/C DC   Serratus punches 3# 2/15 3# 2/15 3# 2/15 3# 2/10 3# 2/10 3# 2/15   SL abd/er 2# 2/15 3# 2/10 3#  2/15 3# 2/15  3# 2/15 3# 2/15 each 3# 2/15   Blackburn 1-5  10x ea 10x ea x10 ea x10 each  x10 ea   SHALONDA  10# 2/10 10# 2/15 10# 2/15 10# 2/15 15# 2/15   Rear Delts 10# 2/15 10# 2/15 10# 2/15 10# 2/15  10# 2/15 15# 2/15   Ther Activity                  Modalities         CPw/ pre mod B/L shoulders 15 m 15 m 15 m 15 m 15m  15m   US 3Mhz 1 0 W/cm R shoulder 10m 10 m 10 m 10m 10m  DC

## 2020-09-28 ENCOUNTER — TELEPHONE (OUTPATIENT)
Dept: OBGYN CLINIC | Facility: HOSPITAL | Age: 70
End: 2020-09-28

## 2020-09-28 NOTE — TELEPHONE ENCOUNTER
Pt  Made aware and Stated she is having bilateral knee pain and since symptoms have started worsening   Pt  Will come in to be seen  Appt  Made  Pt  Aware of date, time and place of appt

## 2020-09-28 NOTE — TELEPHONE ENCOUNTER
Patient sees Dr Blair Adames  Patient is calling to let Dr Blair Adames know that the meloxicam he prescribed for her shoulder and neck is not helping with the pain  She stated she is also using the Voltaren gel and a heating pad which is not helping with the pain either  She stated her knee is now hurting also  I offered the patient an appointment to come in and she declined at this time and asked me to send a message to the Dr to get his recommendations      CB: [phone]

## 2020-09-28 NOTE — TELEPHONE ENCOUNTER
Patient can get stronger medications from her primary care physician  If she desires to see pain management for her neck, we can make an appropriate referral   Please let me know x-ray earliest convenience    Thank you

## 2020-10-01 ENCOUNTER — APPOINTMENT (OUTPATIENT)
Dept: RADIOLOGY | Facility: MEDICAL CENTER | Age: 70
End: 2020-10-01
Payer: MEDICARE

## 2020-10-01 ENCOUNTER — OFFICE VISIT (OUTPATIENT)
Dept: OBGYN CLINIC | Facility: CLINIC | Age: 70
End: 2020-10-01
Payer: MEDICARE

## 2020-10-01 VITALS
HEIGHT: 68 IN | TEMPERATURE: 97.2 F | BODY MASS INDEX: 26.48 KG/M2 | WEIGHT: 174.7 LBS | SYSTOLIC BLOOD PRESSURE: 157 MMHG | HEART RATE: 81 BPM | DIASTOLIC BLOOD PRESSURE: 82 MMHG

## 2020-10-01 DIAGNOSIS — M25.561 CHRONIC PAIN OF BOTH KNEES: ICD-10-CM

## 2020-10-01 DIAGNOSIS — M17.0 PRIMARY OSTEOARTHRITIS OF BOTH KNEES: ICD-10-CM

## 2020-10-01 DIAGNOSIS — M75.42 IMPINGEMENT SYNDROME OF LEFT SHOULDER: Primary | ICD-10-CM

## 2020-10-01 DIAGNOSIS — M25.562 CHRONIC PAIN OF BOTH KNEES: ICD-10-CM

## 2020-10-01 DIAGNOSIS — M75.42 IMPINGEMENT SYNDROME OF LEFT SHOULDER: ICD-10-CM

## 2020-10-01 DIAGNOSIS — G89.29 CHRONIC PAIN OF BOTH KNEES: ICD-10-CM

## 2020-10-01 PROCEDURE — 73030 X-RAY EXAM OF SHOULDER: CPT

## 2020-10-01 PROCEDURE — 99213 OFFICE O/P EST LOW 20 MIN: CPT | Performed by: ORTHOPAEDIC SURGERY

## 2020-10-01 PROCEDURE — 73562 X-RAY EXAM OF KNEE 3: CPT

## 2020-10-01 PROCEDURE — 20610 DRAIN/INJ JOINT/BURSA W/O US: CPT | Performed by: ORTHOPAEDIC SURGERY

## 2020-10-01 RX ORDER — METHYLPREDNISOLONE ACETATE 40 MG/ML
2 INJECTION, SUSPENSION INTRA-ARTICULAR; INTRALESIONAL; INTRAMUSCULAR; SOFT TISSUE
Status: COMPLETED | OUTPATIENT
Start: 2020-10-01 | End: 2020-10-01

## 2020-10-01 RX ORDER — BUPIVACAINE HYDROCHLORIDE 2.5 MG/ML
4 INJECTION, SOLUTION INFILTRATION; PERINEURAL
Status: COMPLETED | OUTPATIENT
Start: 2020-10-01 | End: 2020-10-01

## 2020-10-01 RX ADMIN — BUPIVACAINE HYDROCHLORIDE 4 ML: 2.5 INJECTION, SOLUTION INFILTRATION; PERINEURAL at 10:29

## 2020-10-01 RX ADMIN — METHYLPREDNISOLONE ACETATE 2 ML: 40 INJECTION, SUSPENSION INTRA-ARTICULAR; INTRALESIONAL; INTRAMUSCULAR; SOFT TISSUE at 10:29

## 2020-11-12 ENCOUNTER — OFFICE VISIT (OUTPATIENT)
Dept: OBGYN CLINIC | Facility: CLINIC | Age: 70
End: 2020-11-12
Payer: MEDICARE

## 2020-11-12 VITALS
DIASTOLIC BLOOD PRESSURE: 83 MMHG | TEMPERATURE: 97.2 F | SYSTOLIC BLOOD PRESSURE: 162 MMHG | HEART RATE: 76 BPM | HEIGHT: 68 IN | BODY MASS INDEX: 26.37 KG/M2 | WEIGHT: 174 LBS

## 2020-11-12 DIAGNOSIS — M17.11 PRIMARY OSTEOARTHRITIS OF RIGHT KNEE: Primary | ICD-10-CM

## 2020-11-12 DIAGNOSIS — Z11.59 SPECIAL SCREENING EXAMINATION FOR UNSPECIFIED VIRAL DISEASE: ICD-10-CM

## 2020-11-12 DIAGNOSIS — Z01.812 PRE-OPERATIVE LABORATORY EXAMINATION: ICD-10-CM

## 2020-11-12 PROCEDURE — 20610 DRAIN/INJ JOINT/BURSA W/O US: CPT | Performed by: ORTHOPAEDIC SURGERY

## 2020-11-12 PROCEDURE — 99213 OFFICE O/P EST LOW 20 MIN: CPT | Performed by: ORTHOPAEDIC SURGERY

## 2020-11-12 RX ORDER — CLINDAMYCIN PHOSPHATE 900 MG/50ML
900 INJECTION INTRAVENOUS ONCE
Status: CANCELLED | OUTPATIENT
Start: 2021-01-20 | End: 2020-11-12

## 2020-11-12 RX ORDER — CHLORHEXIDINE GLUCONATE 4 G/100ML
SOLUTION TOPICAL DAILY PRN
Status: CANCELLED | OUTPATIENT
Start: 2021-01-20

## 2020-11-12 RX ORDER — FOLIC ACID 1 MG/1
1 TABLET ORAL DAILY
Qty: 30 TABLET | Refills: 1 | Status: SHIPPED | OUTPATIENT
Start: 2020-11-12 | End: 2020-12-07

## 2020-11-12 RX ORDER — ASCORBIC ACID 500 MG
500 TABLET ORAL 2 TIMES DAILY
Qty: 60 TABLET | Refills: 1 | Status: SHIPPED | OUTPATIENT
Start: 2020-11-12 | End: 2021-04-15

## 2020-11-12 RX ORDER — FERROUS SULFATE TAB EC 324 MG (65 MG FE EQUIVALENT) 324 (65 FE) MG
324 TABLET DELAYED RESPONSE ORAL
Qty: 60 TABLET | Refills: 1 | Status: SHIPPED | OUTPATIENT
Start: 2020-11-12 | End: 2020-12-07

## 2020-11-12 RX ORDER — CHLORHEXIDINE GLUCONATE 0.12 MG/ML
15 RINSE ORAL ONCE
Status: CANCELLED | OUTPATIENT
Start: 2021-01-20 | End: 2020-11-12

## 2020-11-12 RX ORDER — MULTIVIT-MIN/IRON FUM/FOLIC AC 7.5 MG-4
1 TABLET ORAL DAILY
Qty: 30 TABLET | Refills: 1 | Status: SHIPPED | OUTPATIENT
Start: 2020-11-12 | End: 2021-04-15

## 2020-11-12 RX ADMIN — METHYLPREDNISOLONE ACETATE 2 ML: 40 INJECTION, SUSPENSION INTRA-ARTICULAR; INTRALESIONAL; INTRAMUSCULAR; SOFT TISSUE at 11:42

## 2020-11-12 RX ADMIN — BUPIVACAINE HYDROCHLORIDE 4 ML: 2.5 INJECTION, SOLUTION INFILTRATION; PERINEURAL at 11:42

## 2020-11-14 RX ORDER — METHYLPREDNISOLONE ACETATE 40 MG/ML
2 INJECTION, SUSPENSION INTRA-ARTICULAR; INTRALESIONAL; INTRAMUSCULAR; SOFT TISSUE
Status: COMPLETED | OUTPATIENT
Start: 2020-11-12 | End: 2020-11-12

## 2020-11-14 RX ORDER — BUPIVACAINE HYDROCHLORIDE 2.5 MG/ML
4 INJECTION, SOLUTION INFILTRATION; PERINEURAL
Status: COMPLETED | OUTPATIENT
Start: 2020-11-12 | End: 2020-11-12

## 2020-12-05 DIAGNOSIS — Z01.812 PRE-OPERATIVE LABORATORY EXAMINATION: ICD-10-CM

## 2020-12-07 RX ORDER — FERROUS SULFATE TAB EC 324 MG (65 MG FE EQUIVALENT) 324 (65 FE) MG
324 TABLET DELAYED RESPONSE ORAL
Qty: 60 TABLET | Refills: 1 | Status: SHIPPED | OUTPATIENT
Start: 2020-12-07 | End: 2021-04-15

## 2020-12-07 RX ORDER — FOLIC ACID 1 MG/1
TABLET ORAL
Qty: 30 TABLET | Refills: 1 | Status: SHIPPED | OUTPATIENT
Start: 2020-12-07 | End: 2021-04-15

## 2020-12-22 ENCOUNTER — TELEPHONE (OUTPATIENT)
Dept: OBGYN CLINIC | Facility: HOSPITAL | Age: 70
End: 2020-12-22

## 2021-01-04 ENCOUNTER — HOSPITAL ENCOUNTER (OUTPATIENT)
Dept: RADIOLOGY | Facility: HOSPITAL | Age: 71
Discharge: HOME | End: 2021-01-04
Attending: PHYSICIAN ASSISTANT
Payer: MEDICARE

## 2021-01-04 ENCOUNTER — LAB (OUTPATIENT)
Dept: LAB | Facility: HOSPITAL | Age: 71
End: 2021-01-04
Attending: PHYSICIAN ASSISTANT
Payer: MEDICARE

## 2021-01-04 DIAGNOSIS — Z01.812 PRE-OPERATIVE LABORATORY EXAMINATION: ICD-10-CM

## 2021-01-04 LAB
ABO GROUP BLD: NORMAL
ALBUMIN SERPL BCP-MCNC: 4.5 G/DL (ref 3.5–5.7)
ALP SERPL-CCNC: 64 U/L (ref 55–165)
ALT SERPL W P-5'-P-CCNC: 28 U/L (ref 7–52)
ANION GAP SERPL CALCULATED.3IONS-SCNC: 8 MMOL/L (ref 4–13)
APTT PPP: 29 SECONDS (ref 23–37)
AST SERPL W P-5'-P-CCNC: 20 U/L (ref 13–39)
BASOPHILS # BLD AUTO: 0 THOUSANDS/ΜL (ref 0–0.1)
BASOPHILS NFR BLD AUTO: 1 % (ref 0–2)
BILIRUB SERPL-MCNC: 0.4 MG/DL (ref 0.2–1)
BLD GP AB SCN SERPL QL: NEGATIVE
BUN SERPL-MCNC: 14 MG/DL (ref 7–25)
CALCIUM SERPL-MCNC: 9.2 MG/DL (ref 8.6–10.5)
CHLORIDE SERPL-SCNC: 103 MMOL/L (ref 98–107)
CO2 SERPL-SCNC: 27 MMOL/L (ref 21–31)
CREAT SERPL-MCNC: 0.57 MG/DL (ref 0.6–1.2)
CRP SERPL QL: <5 MG/L
EOSINOPHIL # BLD AUTO: 0.3 THOUSAND/ΜL (ref 0–0.61)
EOSINOPHIL NFR BLD AUTO: 6 % (ref 0–5)
ERYTHROCYTE [DISTWIDTH] IN BLOOD BY AUTOMATED COUNT: 12.6 % (ref 11.5–14.5)
FERRITIN SERPL-MCNC: 101 NG/ML (ref 8–388)
GFR SERPL CREATININE-BSD FRML MDRD: 96 ML/MIN/1.73SQ M
GLUCOSE P FAST SERPL-MCNC: 98 MG/DL (ref 65–99)
HCT VFR BLD AUTO: 41.2 % (ref 42–47)
HGB BLD-MCNC: 13.8 G/DL (ref 12–16)
INR PPP: 1 (ref 0.84–1.19)
IRON SATN MFR SERPL: 29 %
IRON SERPL-MCNC: 99 UG/DL (ref 50–170)
LYMPHOCYTES # BLD AUTO: 1.2 THOUSANDS/ΜL (ref 0.6–4.47)
LYMPHOCYTES NFR BLD AUTO: 24 % (ref 21–51)
MCH RBC QN AUTO: 30.6 PG (ref 26–34)
MCHC RBC AUTO-ENTMCNC: 33.6 G/DL (ref 31–37)
MCV RBC AUTO: 91 FL (ref 81–99)
MONOCYTES # BLD AUTO: 0.6 THOUSAND/ΜL (ref 0.17–1.22)
MONOCYTES NFR BLD AUTO: 12 % (ref 2–12)
NEUTROPHILS # BLD AUTO: 3 THOUSANDS/ΜL (ref 1.4–6.5)
NEUTS SEG NFR BLD AUTO: 57 % (ref 42–75)
PLATELET # BLD AUTO: 291 THOUSANDS/UL (ref 149–390)
PMV BLD AUTO: 8 FL (ref 8.6–11.7)
POTASSIUM SERPL-SCNC: 4.1 MMOL/L (ref 3.5–5.5)
PROT SERPL-MCNC: 7 G/DL (ref 6.4–8.9)
PROTHROMBIN TIME: 13.1 SECONDS (ref 11.6–14.5)
RBC # BLD AUTO: 4.53 MILLION/UL (ref 3.9–5.2)
RH BLD: POSITIVE
SODIUM SERPL-SCNC: 138 MMOL/L (ref 134–143)
SPECIMEN EXPIRATION DATE: NORMAL
TIBC SERPL-MCNC: 336 UG/DL (ref 250–450)
WBC # BLD AUTO: 5.3 THOUSAND/UL (ref 4.8–10.8)

## 2021-01-04 PROCEDURE — 71046 X-RAY EXAM CHEST 2 VIEWS: CPT

## 2021-01-04 PROCEDURE — 86140 C-REACTIVE PROTEIN: CPT

## 2021-01-04 PROCEDURE — 86850 RBC ANTIBODY SCREEN: CPT

## 2021-01-04 PROCEDURE — 85730 THROMBOPLASTIN TIME PARTIAL: CPT

## 2021-01-04 PROCEDURE — 83550 IRON BINDING TEST: CPT

## 2021-01-04 PROCEDURE — 83540 ASSAY OF IRON: CPT

## 2021-01-04 PROCEDURE — 80053 COMPREHEN METABOLIC PANEL: CPT

## 2021-01-04 PROCEDURE — 85610 PROTHROMBIN TIME: CPT

## 2021-01-04 PROCEDURE — 82728 ASSAY OF FERRITIN: CPT

## 2021-01-04 PROCEDURE — 36415 COLL VENOUS BLD VENIPUNCTURE: CPT

## 2021-01-04 PROCEDURE — 85025 COMPLETE CBC W/AUTO DIFF WBC: CPT

## 2021-01-04 PROCEDURE — 86901 BLOOD TYPING SEROLOGIC RH(D): CPT

## 2021-01-04 PROCEDURE — 83036 HEMOGLOBIN GLYCOSYLATED A1C: CPT

## 2021-01-04 PROCEDURE — 86900 BLOOD TYPING SEROLOGIC ABO: CPT

## 2021-01-05 ENCOUNTER — ANESTHESIA EVENT (OUTPATIENT)
Dept: PERIOP | Facility: HOSPITAL | Age: 71
DRG: 470 | End: 2021-01-05
Payer: MEDICARE

## 2021-01-05 LAB
EST. AVERAGE GLUCOSE BLD GHB EST-MCNC: 108 MG/DL
HBA1C MFR BLD: 5.4 %

## 2021-01-05 RX ORDER — CHOLECALCIFEROL (VITAMIN D3) 125 MCG
2000 CAPSULE ORAL
COMMUNITY

## 2021-01-05 RX ORDER — OMEGA-3S/DHA/EPA/FISH OIL/D3 300MG-1000
CAPSULE ORAL
COMMUNITY

## 2021-01-05 RX ORDER — B-COMPLEX WITH VITAMIN C
1 TABLET ORAL
COMMUNITY
End: 2021-04-15

## 2021-01-05 NOTE — PRE-PROCEDURE INSTRUCTIONS
Pre-Surgery Instructions:   Medication Instructions    ascorbic acid (VITAMIN C) 500 MG tablet take as directed up to and including day before procedure    Calcium Carbonate-Vitamin D (CALCIUM 600/VITAMIN D PO) hold 1 week brfore procedure    calcium carbonate-vitamin D (OSCAL-D) 500 mg-200 units per tablet hold 1 week brfore procedure    Cholecalciferol (Vitamin D3) 50 MCG (2000 UT) TABS hold 1 week brfore procedure    ferrous sulfate 324 (65 Fe) mg take as directed up to and including day before procedure    folic acid (FOLVITE) 1 mg tablet take as directed up to and including day before procedure    ibuprofen (MOTRIN) 800 mg tablet hold 1 week brfore procedure    magnesium oxide (MAG-OX) 400 mg hold 1 week brfore procedure    metoprolol succinate (TOPROL-XL) 25 mg 24 hr tablet take day of surgery    Multiple Vitamins-Minerals (multivitamin with minerals) tablet hold 1 week brfore procedure    Omega-3 Fatty Acids (Ultra Omega-3 Fish Oil) 1400 MG CAPS hold 1 week brfore procedure    omeprazole (PriLOSEC) 20 mg delayed release capsule take day of surgery    My Surgical Experience    The following information was developed to assist you to prepare for your operation  What do I need to do before coming to the hospital?   Arrange for a responsible person to drive you to and from the hospital    Arrange care for your children at home  Children are not allowed in the recovery areas of the hospital   Plan to wear clothing that is easy to put on and take off  If you are having shoulder surgery, wear a shirt that buttons or zippers in the front  Bathing  o Shower the evening before and the morning of your surgery with an antibacterial soap   Please refer to the Pre Op Showering Instructions for Surgery Patients Sheet   o Remove nail polish and all body piercing jewelry  o Do not shave any body part for at least 24 hours before surgery-this includes face, arms, legs and upper body  Food  o Nothing to eat or drink after midnight the night before your surgery  This includes candy and chewing gum  o Exception: If your surgery is after 12:00pm (noon), you may have clear liquids such as 7-Up®, ginger ale, apple or cranberry juice, Jell-O®, water, or clear broth until 8:00 am  o Do not drink milk or juice with pulp on the morning before surgery  o Do not drink alcohol 24 hours before surgery  Medicine  o Follow instructions you received from your surgeon about which medicines you may take on the day of surgery  o If instructed to take medicine on the morning of surgery, take pills with just a small sip of water  Call your prescribing doctor for specific infroamtion on what to do if you take insulin    What should I bring to the hospital?    Bring:  Shannan Franco or a walker, if you have them, for foot or knee surgery   A list of the daily medicines, vitamins, minerals, herbals and nutritional supplements you take  Include the dosages of medicines and the time you take them each day   Glasses, dentures or hearing aids   Minimal clothing; you will be wearing hospital sleepwear   Photo ID; required to verify your identity   If you have a Living Will or Power of , bring a copy of the documents   If you have an ostomy, bring an extra pouch and any supplies you use    Do not bring   Medicines or inhalers   Money, valuables or jewelry    What other information should I know about the day of surgery?  Notify your surgeons if you develop a cold, sore throat, cough, fever, rash or any other illness   Report to the Ambulatory Surgical/Same Day Surgery Unit   You will be instructed to stop at Registration only if you have not been pre-registered   Inform your  fi they do not stay that they will be asked by the staff to leave a phone number where they can be reached   Be available to be reached before surgery   In the event the operating room schedule changes, you may be asked to come in earlier or later than expected    *It is important to tell your doctor and others involved in your health care if you are taking or have been taking any non-prescription drugs, vitamins, minerals, herbals or other nutritional supplements   Any of these may interact with some food or medicines and cause a reaction

## 2021-01-06 ENCOUNTER — EVALUATION (OUTPATIENT)
Dept: PHYSICAL THERAPY | Facility: CLINIC | Age: 71
End: 2021-01-06
Payer: MEDICARE

## 2021-01-06 DIAGNOSIS — M17.11 PRIMARY OSTEOARTHRITIS OF RIGHT KNEE: Primary | ICD-10-CM

## 2021-01-06 PROCEDURE — 97161 PT EVAL LOW COMPLEX 20 MIN: CPT | Performed by: PHYSICAL THERAPIST

## 2021-01-06 PROCEDURE — 97110 THERAPEUTIC EXERCISES: CPT | Performed by: PHYSICAL THERAPIST

## 2021-01-06 NOTE — PROGRESS NOTES
PT Evaluation     Today's date: 2021  Patient name: Negrito Sr  : 1950  MRN: [id number]  Referring provider: Brennon Castaneda DO  Dx:   Encounter Diagnosis     ICD-10-CM    1  Primary osteoarthritis of right knee  M17 11                   Assessment  Assessment details: Pt is a 78year old female presenting to Outpatient PT for pre-op evaluation for R Total knee replacement scheduled on 2021  Pt with diagnosis of R knee OA failing conservative treatment  Pt presents with impairments consisting of increased R knee pain, decreased R knee ROM and strength  Functional limitations include altered gait pattern with decreased weightbearing R LE, limited tolerance for prolonged standing ambulation reciprocal stair negotiation performance of ADL's and IADL's  Pt was instructed in HEP to be performed post op and educated on surgery process and safety post op including review of home safety checklist  Pt educated on importance of fall prevention strategies step negotiation and use of RW  Pt verbalized understanding  Pt will be re-evaluated post-op and begin Outpatient PT  Impairments: abnormal gait, abnormal or restricted ROM, activity intolerance, impaired physical strength, lacks appropriate home exercise program, pain with function and weight-bearing intolerance  Functional limitations: prolonged standing, walking, stair negotiation, bending, squatting, ADL's, IADL's  Symptom irritability: moderateUnderstanding of Dx/Px/POC: good   Prognosis: good    Goals  STG's once pt is post op to be met in 3-4 weeks  1  Decrease pain by 25% at worst with activity  2  Improve R knee AROM to at least 100 deg flex  3  Improve R hip knee and ankle strength to within 10 lbs of L  4  Improve standing/walking tolerance to 30-45 minutes   5  Pt will sleep through night without waking due to pain  LTG's to be met in 7-8 weeks  1  Decrease pain to 2/10 at worst with activity  2   Improve ROM to at least 115 degrees flex, 0 degrees extension  3  Improve R hip knee and ankle strength to within 3-5 lbs of L  4  Improve step negotiation to reciprocal no LOB or instability  5  Improve standing and walking tolerance to at least 1 hour   6  Independent with HEP  7  Improve Foto score to at least 60%    Plan  Plan details: Pt provided with HEP to be performed post-op  Pt demonstrated proper performance of all TE performed  Reviewed home checklist with patient to prepare home for return post op  Pt offering no questions regarding HEP, procedure, and home preparation  Pt will return to Outpatient PT once post op and will be re-evaluated  Patient would benefit from: skilled physical therapy  Planned modality interventions: unattended electrical stimulation and cryotherapy  Planned therapy interventions: joint mobilization, manual therapy, home exercise program, therapeutic exercise, stretching, strengthening, patient education, gait training, flexibility, therapeutic activities and balance  Frequency: 3x week  Duration in weeks: 8  Plan of Care beginning date: 2021  Plan of Care expiration date: 3/7/2021  Treatment plan discussed with: patient        Subjective Evaluation    History of Present Illness  Mechanism of injury: Pt is a 78year old female presenting for pre-op evaluation for R TKR  Pt reports since 2020 R knee pain has gotten worse  Pt reports he has had injections in the past and failed conservative treatment  Pt is scheduled for R TKR on 2021     Quality of life: good    Pain  Current pain ratin  At best pain rating: 3  At worst pain rating: 10  Location: R knee  Relieving factors: ice, heat and rest    Social Support  Steps to enter house: yes  1  Stairs in house: yes   12  Lives in: multiple-level home  Lives with: spouse    Employment status: not working (retired)  Hand dominance: right      Diagnostic Tests  X-ray: abnormal    FCE comments: R knee osteoarthritisTreatments  Previous treatment: injection treatment  Current treatment: physical therapy  Patient Goals  Patient goals for therapy: decreased pain, decreased edema, increased motion, increased strength, independence with ADLs/IADLs, return to sport/leisure activities and improved balance  Patient goal: return to 2-4 mile walks        Objective     Observations     Additional Observation Details  Antalgic gait pattern with decreased weightbearing R LE, gait is with no A D    Pt with moderate valgus deformity R knee    Neurological Testing     Sensation     Knee   Left Knee   Intact: light touch    Right Knee   Intact: light touch     Active Range of Motion   Left Knee   Flexion: 115 degrees   Extension: 0 degrees     Right Knee   Flexion: 91 degrees   Extension: 0 degrees     Passive Range of Motion   Left Knee   Flexion: 120 degrees   Extension: 0 degrees     Right Knee   Flexion: 107 degrees   Extension: 0 degrees     Strength/Myotome Testing     Additional Strength Details                    R                  L  Hip       Flex      16 lbs          18 lbs      Abd       26 lbs          25 lbs      Add       19 lbs          23 lbs  Knee      Ext        15 lbs          25 lbs     Flex        19 lbs          22 lbs  Ankle       DF        15 lbs           21 lbs       PF         43 lbs           42 lbs    General Comments:      Knee Comments  Girth:                 R                L  suprapat         43 0 cm  Mid joint          41 3 cm    Stair negotiation is with step to pattern due to pain  Standing tolerance 30 minutes with increased pain  Walking tolerance 1 1/2 blocks  Difficulty with donning pants shoes and socks  Pt unable to kneel or squat  Sleep disturbed by pain 3 nights per week             Precautions: R TKR 1/20/2021      Date: 1/6/21       Pain: 5       Visit: 1       Manuals        PROM and jt mobilization R knee        Calf and Hs stretching                        Neuro Re-Ed        Standing marching        Standing hip abduction        Step ups        Mini-squats                                Ther Ex        Quad sets x10 3"       Glut sets x10 3"       SLR x10       Heel slides x10       Ankle pumps x10       Supine hip abd x10       SAQ's        Adductor squeezes        T-band hs curls        LAQ's x10               Ther Activity                        Gait Training                        Modalities        CP w/ IFC R knee

## 2021-01-06 NOTE — LETTER
2021    Neha Rosario DO  [address]   [facility]    Patient: Jessica Reese   YOB: 1950   Date of Visit: 2021     Encounter Diagnosis     ICD-10-CM    1  Primary osteoarthritis of right knee  M17 11        Dear Dr Richard Melara:    Thank you for your recent referral of Jessica Reese  Please review the attached evaluation summary from [facility] recent visit  Please verify that you agree with the plan of care by signing the attached order  If you have any questions or concerns, please do not hesitate to call  I sincerely appreciate the opportunity to share in the care of one of your patients and hope to have another opportunity to work with you in the near future  Sincerely,    Yesy Hodge, PT      Referring Provider:      I certify that I have read the below Plan of Care and certify the need for these services furnished under this plan of treatment while under my care  Neha Rosario DO  [address] [place] [address]          PT Evaluation     Today's date: 2021  Patient name: Jessica Reese  : 1950  MRN: [id number]  Referring provider: Radha Suarez DO  Dx:   Encounter Diagnosis     ICD-10-CM    1  Primary osteoarthritis of right knee  M17 11                   Assessment  Assessment details: Pt is a 78year old female presenting to Outpatient PT for pre-op evaluation for R Total knee replacement scheduled on 2021  Pt with diagnosis of R knee OA failing conservative treatment  Pt presents with impairments consisting of increased R knee pain, decreased R knee ROM and strength  Functional limitations include altered gait pattern with decreased weightbearing R LE, limited tolerance for prolonged standing ambulation reciprocal stair negotiation performance of ADL's and IADL's   Pt was instructed in HEP to be performed post op and educated on surgery process and safety post op Addended by: NGHIA CHAIDEZ on: 10/24/2018 02:37 PM     Modules accepted: Orders     including review of home safety checklist  Pt educated on importance of fall prevention strategies step negotiation and use of RW  Pt verbalized understanding  Pt will be re-evaluated post-op and begin Outpatient PT  Impairments: abnormal gait, abnormal or restricted ROM, activity intolerance, impaired physical strength, lacks appropriate home exercise program, pain with function and weight-bearing intolerance  Functional limitations: prolonged standing, walking, stair negotiation, bending, squatting, ADL's, IADL's  Symptom irritability: moderateUnderstanding of Dx/Px/POC: good   Prognosis: good    Goals  STG's once pt is post op to be met in 3-4 weeks  1  Decrease pain by 25% at worst with activity  2  Improve R knee AROM to at least 100 deg flex  3  Improve R hip knee and ankle strength to within 10 lbs of L  4  Improve standing/walking tolerance to 30-45 minutes   5  Pt will sleep through night without waking due to pain  LTG's to be met in 7-8 weeks  1  Decrease pain to 2/10 at worst with activity  2  Improve ROM to at least 115 degrees flex, 0 degrees extension  3  Improve R hip knee and ankle strength to within 3-5 lbs of L  4  Improve step negotiation to reciprocal no LOB or instability  5  Improve standing and walking tolerance to at least 1 hour   6  Independent with HEP  7  Improve Foto score to at least 60%    Plan  Plan details: Pt provided with HEP to be performed post-op  Pt demonstrated proper performance of all TE performed  Reviewed home checklist with patient to prepare home for return post op  Pt offering no questions regarding HEP, procedure, and home preparation  Pt will return to Outpatient PT once post op and will be re-evaluated     Patient would benefit from: skilled physical therapy  Planned modality interventions: unattended electrical stimulation and cryotherapy  Planned therapy interventions: joint mobilization, manual therapy, home exercise program, therapeutic exercise, stretching, strengthening, patient education, gait training, flexibility, therapeutic activities and balance  Frequency: 3x week  Duration in weeks: 8  Plan of Care beginning date: 2021  Plan of Care expiration date: 3/7/2021  Treatment plan discussed with: patient        Subjective Evaluation    History of Present Illness  Mechanism of injury: Pt is a 78year old female presenting for pre-op evaluation for R TKR  Pt reports since 2020 R knee pain has gotten worse  Pt reports he has had injections in the past and failed conservative treatment  Pt is scheduled for R TKR on 2021  Quality of life: good    Pain  Current pain ratin  At best pain rating: 3  At worst pain rating: 10  Location: R knee  Relieving factors: ice, heat and rest    Social Support  Steps to enter house: yes  1  Stairs in house: yes   12  Lives in: multiple-level home  Lives with: spouse    Employment status: not working (retired)  Hand dominance: right      Diagnostic Tests  X-ray: abnormal    FCE comments: R knee osteoarthritisTreatments  Previous treatment: injection treatment  Current treatment: physical therapy  Patient Goals  Patient goals for therapy: decreased pain, decreased edema, increased motion, increased strength, independence with ADLs/IADLs, return to sport/leisure activities and improved balance  Patient goal: return to 2-4 mile walks        Objective     Observations     Additional Observation Details  Antalgic gait pattern with decreased weightbearing R LE, gait is with no A D    Pt with moderate valgus deformity R knee    Neurological Testing     Sensation     Knee   Left Knee   Intact: light touch    Right Knee   Intact: light touch     Active Range of Motion   Left Knee   Flexion: 115 degrees   Extension: 0 degrees     Right Knee   Flexion: 91 degrees   Extension: 0 degrees     Passive Range of Motion   Left Knee   Flexion: 120 degrees   Extension: 0 degrees     Right Knee   Flexion: 107 degrees   Extension: 0 degrees     Strength/Myotome Testing     Additional Strength Details                    R                  L  Hip       Flex      16 lbs          18 lbs      Abd       26 lbs          25 lbs      Add       19 lbs          23 lbs  Knee      Ext        15 lbs          25 lbs     Flex        19 lbs          22 lbs  Ankle       DF        15 lbs           21 lbs       PF         43 lbs           42 lbs    General Comments:      Knee Comments  Girth:                 R                L  suprapat         43 0 cm  Mid joint          41 3 cm    Stair negotiation is with step to pattern due to pain  Standing tolerance 30 minutes with increased pain  Walking tolerance 1 1/2 blocks  Difficulty with donning pants shoes and socks  Pt unable to kneel or squat  Sleep disturbed by pain 3 nights per week             Precautions: R TKR 1/20/2021      Date: 1/6/21       Pain: 5       Visit: 1       Manuals        PROM and jt mobilization R knee        Calf and Hs stretching                        Neuro Re-Ed        Standing marching        Standing hip abduction        Step ups        Mini-squats                                Ther Ex        Quad sets x10 3"       Glut sets x10 3"       SLR x10       Heel slides x10       Ankle pumps x10       Supine hip abd x10       SAQ's        Adductor squeezes        T-band hs curls        LAQ's x10               Ther Activity                        Gait Training                        Modalities        CP w/ IFC R knee

## 2021-01-14 DIAGNOSIS — Z11.59 SPECIAL SCREENING EXAMINATION FOR UNSPECIFIED VIRAL DISEASE: ICD-10-CM

## 2021-01-14 PROCEDURE — U0003 INFECTIOUS AGENT DETECTION BY NUCLEIC ACID (DNA OR RNA); SEVERE ACUTE RESPIRATORY SYNDROME CORONAVIRUS 2 (SARS-COV-2) (CORONAVIRUS DISEASE [COVID-19]), AMPLIFIED PROBE TECHNIQUE, MAKING USE OF HIGH THROUGHPUT TECHNOLOGIES AS DESCRIBED BY CMS-2020-01-R: HCPCS

## 2021-01-14 PROCEDURE — U0005 INFEC AGEN DETEC AMPLI PROBE: HCPCS

## 2021-01-15 ENCOUNTER — TELEPHONE (OUTPATIENT)
Dept: OBGYN CLINIC | Facility: HOSPITAL | Age: 71
End: 2021-01-15

## 2021-01-15 LAB — SARS-COV-2 RNA RESP QL NAA+PROBE: NEGATIVE

## 2021-01-15 NOTE — TELEPHONE ENCOUNTER
Patient sees Dr Shruthi Spear is calling in from [facility]  She is calling in stating that on the OR consent the wrong  is entered for this patient, she is stating that it needs to be deleted and the correct  needs to be entered          Call back# [phone]

## 2021-01-18 PROCEDURE — NC001 PR NO CHARGE: Performed by: ORTHOPAEDIC SURGERY

## 2021-01-18 NOTE — H&P
H&P Exam - Orthopedics   Mike Parrish 78 y o  female MRN: [id number]  Unit/Bed#:  Encounter: [id number]    Assessment/Plan     Assessment:  Advanced primary osteoarthritis right knee  Plan:  Elective right total knee replacement    History of Present Illness   HPI:  Mike Parrish is a 78 y o  female who presented to our office complaining of worsening right knee pain  The patient states she has had this pain for quite some time  She states that the pain is starting to affect her quality of life  She denies any numbness or tingling  X-rays were performed which shown advanced arthritic changes  After exhausting conservative treatment, the risks and benefits surgery were discussed with the patient  She decided on an elective right total knee replacement  Review of Systems   Constitutional: Negative for chills, fever and unexpected weight change  HENT: Negative for hearing loss, nosebleeds and sore throat  Eyes: Negative for pain, redness and visual disturbance  Respiratory: Negative for cough, shortness of breath and wheezing  Cardiovascular: Negative for chest pain, palpitations and leg swelling  Gastrointestinal: Negative for abdominal pain, nausea and vomiting  Endocrine: Negative for polydipsia and polyuria  Genitourinary: Negative for dysuria and hematuria  Musculoskeletal: Positive for arthralgias, gait problem and joint swelling  As noted in HPI   Skin: Negative for rash and wound  Neurological: Negative for dizziness, numbness and headaches  Psychiatric/Behavioral: Negative for decreased concentration and suicidal ideas  The patient is not nervous/anxious          Historical Information   Past Medical History:   Diagnosis Date    Cardiac murmur due to mitral valve disorder     Hyperlipidemia     Hypertension      Past Surgical History:   Procedure Laterality Date    CARPAL TUNNEL RELEASE Bilateral     CATARACT EXTRACTION Bilateral     KNEE SURGERY Right     PLANTAR'S WART EXCISION Right     TONSILLECTOMY      WRIST SURGERY Bilateral      Social History   Social History     Substance and Sexual Activity   Alcohol Use Yes    Comment: occasional     Social History     Substance and Sexual Activity   Drug Use Never     Social History     Tobacco Use   Smoking Status Never Smoker   Smokeless Tobacco Never Used     Family History: non-contributory    Meds/Allergies   all medications and allergies reviewed  Allergies   Allergen Reactions    Levaquin [Levofloxacin] Hives    Lansoprazole Other (See Comments)     rash    Aspirin Rash    Cephalosporins Rash and Other (See Comments)     rash    Keflex [Cephalexin] Rash    Penicillins Rash    Tetracycline Rash       Objective   Vitals: Weight 78 9 kg (174 lb)  ,Body mass index is 26 46 kg/m²  No intake or output data in the 24 hours ending 01/18/21 1136    No intake/output data recorded  Invasive Devices     None                 Physical Exam  Ortho Exam  Right lower extremity is neurovascularly intact  Toes are pink and mobile   Compartments are soft  No warmth, erythema or ecchymosis  ROM of knee is from 5-115 degrees  Negative Lachman, drawer or pivot shift  No medial instability  Medial joint line tenderness, slight lateral joint line tenderness  Patellofemoral crepitation  Lungs CTA  Heart RRR  Lab Results: I have personally reviewed pertinent lab results  Imaging: I have personally reviewed pertinent reports  EKG, Pathology, and Other Studies: I have personally reviewed pertinent reports  Code Status: No Order  Advance Directive and Living Will:      Power of :    POLST:      Counseling / Coordination of Care  Total floor / unit time spent today 30 minutes  Greater than 50% of total time was spent with the patient and / or family counseling and / or coordination of care

## 2021-01-20 ENCOUNTER — HOSPITAL ENCOUNTER (INPATIENT)
Facility: HOSPITAL | Age: 71
LOS: 2 days | Discharge: HOME HEALTH | DRG: 470 | End: 2021-01-22
Attending: ORTHOPAEDIC SURGERY | Admitting: ORTHOPAEDIC SURGERY
Payer: MEDICARE

## 2021-01-20 ENCOUNTER — ANESTHESIA (OUTPATIENT)
Dept: PERIOP | Facility: HOSPITAL | Age: 71
DRG: 470 | End: 2021-01-20
Payer: MEDICARE

## 2021-01-20 ENCOUNTER — APPOINTMENT (OUTPATIENT)
Dept: RADIOLOGY | Facility: HOSPITAL | Age: 71
DRG: 470 | End: 2021-01-20
Attending: PHYSICIAN ASSISTANT
Payer: MEDICARE

## 2021-01-20 VITALS — HEART RATE: 87 BPM

## 2021-01-20 DIAGNOSIS — M17.11 PRIMARY OSTEOARTHRITIS OF RIGHT KNEE: Primary | ICD-10-CM

## 2021-01-20 PROCEDURE — C1776 JOINT DEVICE (IMPLANTABLE): HCPCS | Performed by: ORTHOPAEDIC SURGERY

## 2021-01-20 PROCEDURE — 27447 TOTAL KNEE ARTHROPLASTY: CPT | Performed by: ORTHOPAEDIC SURGERY

## 2021-01-20 PROCEDURE — C1713 ANCHOR/SCREW BN/BN,TIS/BN: HCPCS | Performed by: ORTHOPAEDIC SURGERY

## 2021-01-20 PROCEDURE — 73560 X-RAY EXAM OF KNEE 1 OR 2: CPT

## 2021-01-20 PROCEDURE — 88305 TISSUE EXAM BY PATHOLOGIST: CPT | Performed by: PATHOLOGY

## 2021-01-20 PROCEDURE — 88311 DECALCIFY TISSUE: CPT | Performed by: PATHOLOGY

## 2021-01-20 PROCEDURE — 97166 OT EVAL MOD COMPLEX 45 MIN: CPT

## 2021-01-20 PROCEDURE — 99222 1ST HOSP IP/OBS MODERATE 55: CPT | Performed by: INTERNAL MEDICINE

## 2021-01-20 PROCEDURE — 27447 TOTAL KNEE ARTHROPLASTY: CPT | Performed by: PHYSICIAN ASSISTANT

## 2021-01-20 PROCEDURE — 0SRC0J9 REPLACEMENT OF RIGHT KNEE JOINT WITH SYNTHETIC SUBSTITUTE, CEMENTED, OPEN APPROACH: ICD-10-PCS | Performed by: ORTHOPAEDIC SURGERY

## 2021-01-20 PROCEDURE — G9196 MED REASON FOR NO CEPH: HCPCS | Performed by: ORTHOPAEDIC SURGERY

## 2021-01-20 PROCEDURE — 97163 PT EVAL HIGH COMPLEX 45 MIN: CPT

## 2021-01-20 DEVICE — IMPLANTABLE DEVICE
Type: IMPLANTABLE DEVICE | Site: KNEE | Status: FUNCTIONAL
Brand: NEXGEN® LEGACY® GENDER SOLUTIONS™

## 2021-01-20 DEVICE — COMPONENT PATELLAR 9MM SZ 35 NEXGEN: Type: IMPLANTABLE DEVICE | Site: KNEE | Status: FUNCTIONAL

## 2021-01-20 DEVICE — IMPLANTABLE DEVICE
Type: IMPLANTABLE DEVICE | Site: KNEE | Status: FUNCTIONAL
Brand: NEXGEN®

## 2021-01-20 DEVICE — PALACOS® R+G IS A FAST SETTING POLYMER CONTAINING GENTAMICIN, FOR USE IN BONE SURGERY. MIXING OF THE TWO COMPONENT SYSTEM, CONSISTING OF A POWDER AND A LIQUID, INITIALLY PRODUCES A LIQUID AND THEN A PASTE, WHICH IS USED TO ANCHOR THE PROSTHESIS TO THE BONE. THE HARDENED BONE CEMENT ALLOWS STABLE FIXATION OF THE PROSTHESIS AND TRANSFERS ALL STRESSES PRODUCED IN A MOVEMENT TO THE BONE VIA THE LARGE INTERFACE. INSOLUBLE ZIRCONIUM DIOXIDE IS INCLUDED IN THE CEMENT POWDER AS AN X RAY CONTRAST MEDIUM. THE CHLOROPHYLL ADDITIVE SERVES AS OPTICAL MARKING OF THE BONE CEMENT AT THE SITE OF THE OPERATION.
Type: IMPLANTABLE DEVICE | Site: KNEE | Status: FUNCTIONAL
Brand: PALACOS®

## 2021-01-20 DEVICE — PALACOS® R IS A FAST-CURING, RADIOPAQUE, POLY(METHYL METHACRYLATE)-BASED BONE CEMENT.PALACOS ® R CONTAINS THE X-RAY CONTRAST MEDIUM ZIRCONIUM DIOXIDE. TO IMPROVE VISIBILITY IN THE SURGICAL FIELD PALACOS ® R HAS BEEN COLOURED WITH CHLOROPHYLL (E141). THE BONE CEMENT IS PREPARED DIRECTLY BEFORE USE BY MIXING A POLYMER POWDER COMPONENT WITH A LIQUID MONOMER COMPONENT. A DUCTILE DOUGH FORMS WHICH CURES WITHIN A FEW MINUTES.
Type: IMPLANTABLE DEVICE | Site: KNEE | Status: FUNCTIONAL
Brand: PALACOS®

## 2021-01-20 DEVICE — SCREW EXTENSION NEXGEN REPLACEMENT: Type: IMPLANTABLE DEVICE | Site: KNEE | Status: FUNCTIONAL

## 2021-01-20 DEVICE — COMPONENT TIBIAL PRECOAT STEMMED SZ 3 TIVANIUM NEXGEN: Type: IMPLANTABLE DEVICE | Site: KNEE | Status: FUNCTIONAL

## 2021-01-20 DEVICE — IMPLANTABLE DEVICE
Type: IMPLANTABLE DEVICE | Site: KNEE | Status: FUNCTIONAL
Brand: NEXGEN® LEGACY®

## 2021-01-20 RX ORDER — PROPOFOL 10 MG/ML
INJECTION, EMULSION INTRAVENOUS AS NEEDED
Status: DISCONTINUED | OUTPATIENT
Start: 2021-01-20 | End: 2021-01-20

## 2021-01-20 RX ORDER — BUPIVACAINE HYDROCHLORIDE 7.5 MG/ML
INJECTION, SOLUTION INTRASPINAL AS NEEDED
Status: DISCONTINUED | OUTPATIENT
Start: 2021-01-20 | End: 2021-01-20

## 2021-01-20 RX ORDER — METOCLOPRAMIDE HYDROCHLORIDE 5 MG/ML
10 INJECTION INTRAMUSCULAR; INTRAVENOUS ONCE AS NEEDED
Status: DISCONTINUED | OUTPATIENT
Start: 2021-01-20 | End: 2021-01-20 | Stop reason: HOSPADM

## 2021-01-20 RX ORDER — VANCOMYCIN HYDROCHLORIDE 500 MG/100ML
500 INJECTION, SOLUTION INTRAVENOUS EVERY 12 HOURS
Status: DISCONTINUED | OUTPATIENT
Start: 2021-01-20 | End: 2021-01-20

## 2021-01-20 RX ORDER — HYDROMORPHONE HCL/PF 1 MG/ML
0.4 SYRINGE (ML) INJECTION
Status: DISCONTINUED | OUTPATIENT
Start: 2021-01-20 | End: 2021-01-20 | Stop reason: HOSPADM

## 2021-01-20 RX ORDER — SODIUM CHLORIDE, SODIUM LACTATE, POTASSIUM CHLORIDE, CALCIUM CHLORIDE 600; 310; 30; 20 MG/100ML; MG/100ML; MG/100ML; MG/100ML
125 INJECTION, SOLUTION INTRAVENOUS CONTINUOUS
Status: DISCONTINUED | OUTPATIENT
Start: 2021-01-20 | End: 2021-01-20

## 2021-01-20 RX ORDER — MELATONIN
2000 DAILY
Status: DISCONTINUED | OUTPATIENT
Start: 2021-01-20 | End: 2021-01-22 | Stop reason: HOSPADM

## 2021-01-20 RX ORDER — ACETAMINOPHEN 325 MG/1
650 TABLET ORAL EVERY 4 HOURS PRN
Status: DISCONTINUED | OUTPATIENT
Start: 2021-01-20 | End: 2021-01-22 | Stop reason: HOSPADM

## 2021-01-20 RX ORDER — CLINDAMYCIN PHOSPHATE 900 MG/50ML
900 INJECTION INTRAVENOUS ONCE
Status: COMPLETED | OUTPATIENT
Start: 2021-01-20 | End: 2021-01-20

## 2021-01-20 RX ORDER — ROPIVACAINE HYDROCHLORIDE 5 MG/ML
INJECTION, SOLUTION EPIDURAL; INFILTRATION; PERINEURAL
Status: COMPLETED | OUTPATIENT
Start: 2021-01-20 | End: 2021-01-20

## 2021-01-20 RX ORDER — OXYCODONE HYDROCHLORIDE AND ACETAMINOPHEN 5; 325 MG/1; MG/1
2 TABLET ORAL EVERY 6 HOURS PRN
Status: DISCONTINUED | OUTPATIENT
Start: 2021-01-20 | End: 2021-01-20

## 2021-01-20 RX ORDER — PROPOFOL 10 MG/ML
INJECTION, EMULSION INTRAVENOUS CONTINUOUS PRN
Status: DISCONTINUED | OUTPATIENT
Start: 2021-01-20 | End: 2021-01-20

## 2021-01-20 RX ORDER — DOCUSATE SODIUM 100 MG/1
100 CAPSULE, LIQUID FILLED ORAL 2 TIMES DAILY
Status: DISCONTINUED | OUTPATIENT
Start: 2021-01-20 | End: 2021-01-22 | Stop reason: HOSPADM

## 2021-01-20 RX ORDER — CHLORHEXIDINE GLUCONATE 0.12 MG/ML
15 RINSE ORAL ONCE
Status: COMPLETED | OUTPATIENT
Start: 2021-01-20 | End: 2021-01-20

## 2021-01-20 RX ORDER — FOLIC ACID 1 MG/1
1 TABLET ORAL DAILY
Status: DISCONTINUED | OUTPATIENT
Start: 2021-01-20 | End: 2021-01-22 | Stop reason: HOSPADM

## 2021-01-20 RX ORDER — CHLORHEXIDINE GLUCONATE 4 G/100ML
SOLUTION TOPICAL DAILY PRN
Status: DISCONTINUED | OUTPATIENT
Start: 2021-01-20 | End: 2021-01-20

## 2021-01-20 RX ORDER — ONDANSETRON 2 MG/ML
4 INJECTION INTRAMUSCULAR; INTRAVENOUS EVERY 6 HOURS PRN
Status: DISCONTINUED | OUTPATIENT
Start: 2021-01-20 | End: 2021-01-22 | Stop reason: HOSPADM

## 2021-01-20 RX ORDER — PROMETHAZINE HYDROCHLORIDE 25 MG/ML
12.5 INJECTION, SOLUTION INTRAMUSCULAR; INTRAVENOUS ONCE AS NEEDED
Status: DISCONTINUED | OUTPATIENT
Start: 2021-01-20 | End: 2021-01-20 | Stop reason: HOSPADM

## 2021-01-20 RX ORDER — FENTANYL CITRATE 50 UG/ML
INJECTION, SOLUTION INTRAMUSCULAR; INTRAVENOUS AS NEEDED
Status: DISCONTINUED | OUTPATIENT
Start: 2021-01-20 | End: 2021-01-20

## 2021-01-20 RX ORDER — FONDAPARINUX SODIUM 2.5 MG/.5ML
2.5 INJECTION SUBCUTANEOUS DAILY
Status: DISCONTINUED | OUTPATIENT
Start: 2021-01-21 | End: 2021-01-22 | Stop reason: HOSPADM

## 2021-01-20 RX ORDER — MORPHINE SULFATE 4 MG/ML
3 INJECTION, SOLUTION INTRAMUSCULAR; INTRAVENOUS EVERY 4 HOURS PRN
Status: DISCONTINUED | OUTPATIENT
Start: 2021-01-20 | End: 2021-01-22 | Stop reason: HOSPADM

## 2021-01-20 RX ORDER — METOPROLOL SUCCINATE 25 MG/1
25 TABLET, EXTENDED RELEASE ORAL DAILY
Status: DISCONTINUED | OUTPATIENT
Start: 2021-01-21 | End: 2021-01-22 | Stop reason: HOSPADM

## 2021-01-20 RX ORDER — KETOROLAC TROMETHAMINE 30 MG/ML
30 INJECTION, SOLUTION INTRAMUSCULAR; INTRAVENOUS ONCE
Status: COMPLETED | OUTPATIENT
Start: 2021-01-20 | End: 2021-01-20

## 2021-01-20 RX ORDER — OXYCODONE HYDROCHLORIDE AND ACETAMINOPHEN 5; 325 MG/1; MG/1
1 TABLET ORAL EVERY 4 HOURS PRN
Status: DISCONTINUED | OUTPATIENT
Start: 2021-01-20 | End: 2021-01-22 | Stop reason: HOSPADM

## 2021-01-20 RX ORDER — VANCOMYCIN HYDROCHLORIDE 500 MG/100ML
500 INJECTION, SOLUTION INTRAVENOUS EVERY 12 HOURS
Status: COMPLETED | OUTPATIENT
Start: 2021-01-20 | End: 2021-01-21

## 2021-01-20 RX ORDER — ALBUTEROL SULFATE 2.5 MG/3ML
2.5 SOLUTION RESPIRATORY (INHALATION) ONCE AS NEEDED
Status: DISCONTINUED | OUTPATIENT
Start: 2021-01-20 | End: 2021-01-20 | Stop reason: HOSPADM

## 2021-01-20 RX ORDER — ONDANSETRON 2 MG/ML
INJECTION INTRAMUSCULAR; INTRAVENOUS AS NEEDED
Status: DISCONTINUED | OUTPATIENT
Start: 2021-01-20 | End: 2021-01-20

## 2021-01-20 RX ORDER — PANTOPRAZOLE SODIUM 40 MG/1
40 TABLET, DELAYED RELEASE ORAL
Status: DISCONTINUED | OUTPATIENT
Start: 2021-01-21 | End: 2021-01-22 | Stop reason: HOSPADM

## 2021-01-20 RX ORDER — ONDANSETRON 2 MG/ML
4 INJECTION INTRAMUSCULAR; INTRAVENOUS ONCE AS NEEDED
Status: DISCONTINUED | OUTPATIENT
Start: 2021-01-20 | End: 2021-01-20 | Stop reason: HOSPADM

## 2021-01-20 RX ORDER — MORPHINE SULFATE 4 MG/ML
3 INJECTION, SOLUTION INTRAMUSCULAR; INTRAVENOUS EVERY 4 HOURS PRN
Status: DISCONTINUED | OUTPATIENT
Start: 2021-01-20 | End: 2021-01-20

## 2021-01-20 RX ORDER — POVIDONE-IODINE 10 MG/G
OINTMENT TOPICAL AS NEEDED
Status: DISCONTINUED | OUTPATIENT
Start: 2021-01-20 | End: 2021-01-20 | Stop reason: HOSPADM

## 2021-01-20 RX ORDER — GENTAMICIN SULFATE 80 MG/100ML
80 INJECTION, SOLUTION INTRAVENOUS EVERY 8 HOURS
Status: DISCONTINUED | OUTPATIENT
Start: 2021-01-20 | End: 2021-01-20

## 2021-01-20 RX ORDER — MAGNESIUM HYDROXIDE/ALUMINUM HYDROXICE/SIMETHICONE 120; 1200; 1200 MG/30ML; MG/30ML; MG/30ML
30 SUSPENSION ORAL EVERY 6 HOURS PRN
Status: DISCONTINUED | OUTPATIENT
Start: 2021-01-20 | End: 2021-01-22 | Stop reason: HOSPADM

## 2021-01-20 RX ORDER — FENTANYL CITRATE/PF 50 MCG/ML
50 SYRINGE (ML) INJECTION
Status: DISCONTINUED | OUTPATIENT
Start: 2021-01-20 | End: 2021-01-20 | Stop reason: HOSPADM

## 2021-01-20 RX ORDER — B-COMPLEX WITH VITAMIN C
1 TABLET ORAL
Status: DISCONTINUED | OUTPATIENT
Start: 2021-01-21 | End: 2021-01-22 | Stop reason: HOSPADM

## 2021-01-20 RX ORDER — GENTAMICIN SULFATE 80 MG/100ML
80 INJECTION, SOLUTION INTRAVENOUS EVERY 8 HOURS
Status: COMPLETED | OUTPATIENT
Start: 2021-01-20 | End: 2021-01-21

## 2021-01-20 RX ORDER — MIDAZOLAM HYDROCHLORIDE 2 MG/2ML
INJECTION, SOLUTION INTRAMUSCULAR; INTRAVENOUS AS NEEDED
Status: DISCONTINUED | OUTPATIENT
Start: 2021-01-20 | End: 2021-01-20

## 2021-01-20 RX ORDER — KETOROLAC TROMETHAMINE 30 MG/ML
15 INJECTION, SOLUTION INTRAMUSCULAR; INTRAVENOUS EVERY 6 HOURS PRN
Status: DISCONTINUED | OUTPATIENT
Start: 2021-01-20 | End: 2021-01-22 | Stop reason: HOSPADM

## 2021-01-20 RX ORDER — FERROUS SULFATE 325(65) MG
325 TABLET ORAL 2 TIMES DAILY WITH MEALS
Status: DISCONTINUED | OUTPATIENT
Start: 2021-01-20 | End: 2021-01-22 | Stop reason: HOSPADM

## 2021-01-20 RX ORDER — ASCORBIC ACID 500 MG
500 TABLET ORAL 2 TIMES DAILY
Status: DISCONTINUED | OUTPATIENT
Start: 2021-01-20 | End: 2021-01-22 | Stop reason: HOSPADM

## 2021-01-20 RX ADMIN — FENTANYL CITRATE 50 MCG: 50 INJECTION INTRAMUSCULAR; INTRAVENOUS at 09:24

## 2021-01-20 RX ADMIN — Medication 1 TABLET: at 13:53

## 2021-01-20 RX ADMIN — OXYCODONE HYDROCHLORIDE AND ACETAMINOPHEN 500 MG: 500 TABLET ORAL at 13:53

## 2021-01-20 RX ADMIN — SODIUM CHLORIDE, SODIUM LACTATE, POTASSIUM CHLORIDE, AND CALCIUM CHLORIDE: .6; .31; .03; .02 INJECTION, SOLUTION INTRAVENOUS at 10:08

## 2021-01-20 RX ADMIN — FENTANYL CITRATE 25 MCG: 50 INJECTION INTRAMUSCULAR; INTRAVENOUS at 11:04

## 2021-01-20 RX ADMIN — CHLORHEXIDINE GLUCONATE 0.12% ORAL RINSE 15 ML: 1.2 LIQUID ORAL at 08:39

## 2021-01-20 RX ADMIN — ROPIVACAINE HYDROCHLORIDE 25 ML: 5 INJECTION, SOLUTION EPIDURAL; INFILTRATION; PERINEURAL at 09:24

## 2021-01-20 RX ADMIN — ONDANSETRON 4 MG: 2 INJECTION INTRAMUSCULAR; INTRAVENOUS at 19:43

## 2021-01-20 RX ADMIN — SODIUM CHLORIDE, SODIUM LACTATE, POTASSIUM CHLORIDE, AND CALCIUM CHLORIDE: .6; .31; .03; .02 INJECTION, SOLUTION INTRAVENOUS at 11:44

## 2021-01-20 RX ADMIN — MAGNESIUM GLUCONATE 500 MG ORAL TABLET 400 MG: 500 TABLET ORAL at 13:53

## 2021-01-20 RX ADMIN — GENTAMICIN SULFATE 80 MG: 80 INJECTION, SOLUTION INTRAVENOUS at 15:00

## 2021-01-20 RX ADMIN — GENTAMICIN SULFATE 80 MG: 80 INJECTION, SOLUTION INTRAVENOUS at 21:49

## 2021-01-20 RX ADMIN — SODIUM CHLORIDE, SODIUM LACTATE, POTASSIUM CHLORIDE, AND CALCIUM CHLORIDE 125 ML/HR: .6; .31; .03; .02 INJECTION, SOLUTION INTRAVENOUS at 08:36

## 2021-01-20 RX ADMIN — PROPOFOL 30 MG: 10 INJECTION, EMULSION INTRAVENOUS at 10:15

## 2021-01-20 RX ADMIN — FENTANYL CITRATE 25 MCG: 50 INJECTION INTRAMUSCULAR; INTRAVENOUS at 11:32

## 2021-01-20 RX ADMIN — PHENYLEPHRINE HYDROCHLORIDE 50 MCG/MIN: 10 INJECTION INTRAVENOUS at 10:10

## 2021-01-20 RX ADMIN — FENTANYL CITRATE 50 MCG: 50 INJECTION INTRAMUSCULAR; INTRAVENOUS at 12:45

## 2021-01-20 RX ADMIN — PROPOFOL 100 MCG/KG/MIN: 10 INJECTION, EMULSION INTRAVENOUS at 10:09

## 2021-01-20 RX ADMIN — KETOROLAC TROMETHAMINE 30 MG: 30 INJECTION, SOLUTION INTRAMUSCULAR; INTRAVENOUS at 16:10

## 2021-01-20 RX ADMIN — MIDAZOLAM HYDROCHLORIDE 2 MG: 1 INJECTION, SOLUTION INTRAMUSCULAR; INTRAVENOUS at 09:22

## 2021-01-20 RX ADMIN — DOCUSATE SODIUM 100 MG: 100 CAPSULE, LIQUID FILLED ORAL at 13:53

## 2021-01-20 RX ADMIN — FOLIC ACID 1 MG: 1 TABLET ORAL at 13:53

## 2021-01-20 RX ADMIN — ONDANSETRON 4 MG: 2 INJECTION INTRAMUSCULAR; INTRAVENOUS at 11:28

## 2021-01-20 RX ADMIN — VANCOMYCIN HYDROCHLORIDE 500 MG: 500 INJECTION, SOLUTION INTRAVENOUS at 17:14

## 2021-01-20 RX ADMIN — BUPIVACAINE HYDROCHLORIDE IN DEXTROSE 2 ML: 7.5 INJECTION, SOLUTION SUBARACHNOID at 10:06

## 2021-01-20 RX ADMIN — CLINDAMYCIN IN 5 PERCENT DEXTROSE 900 MG: 18 INJECTION, SOLUTION INTRAVENOUS at 10:00

## 2021-01-20 RX ADMIN — OXYCODONE HYDROCHLORIDE AND ACETAMINOPHEN 2 TABLET: 5; 325 TABLET ORAL at 19:43

## 2021-01-20 RX ADMIN — Medication 2000 UNITS: at 13:53

## 2021-01-20 RX ADMIN — KETOROLAC TROMETHAMINE 15 MG: 30 INJECTION, SOLUTION INTRAMUSCULAR; INTRAVENOUS at 22:59

## 2021-01-20 RX ADMIN — FENTANYL CITRATE 50 MCG: 50 INJECTION INTRAMUSCULAR; INTRAVENOUS at 12:37

## 2021-01-20 RX ADMIN — ONDANSETRON 4 MG: 2 INJECTION INTRAMUSCULAR; INTRAVENOUS at 13:54

## 2021-01-20 RX ADMIN — MORPHINE SULFATE 3 MG: 4 INJECTION INTRAVENOUS at 13:49

## 2021-01-20 NOTE — PHYSICAL THERAPY NOTE
Physical Therapy Evaluation     Patient's Name: Susy Kumar    Admitting Diagnosis  Primary osteoarthritis of right knee [M17 11]    Problem List  Patient Active Problem List   Diagnosis    Impingement syndrome of left shoulder    Impingement syndrome of right shoulder    Radiculopathy, cervical region    Primary osteoarthritis of both knees    Degenerative joint disease of right knee    PVC's (premature ventricular contractions)    Hypertension    Hyperlipidemia    GERD (gastroesophageal reflux disease)       Past Medical History  Past Medical History:   Diagnosis Date    Cardiac murmur due to mitral valve disorder     GERD (gastroesophageal reflux disease)     Hyperlipidemia     Hypertension     PVC's (premature ventricular contractions)        Past Surgical History  Past Surgical History:   Procedure Laterality Date    CARPAL TUNNEL RELEASE Bilateral     CATARACT EXTRACTION Bilateral     KNEE SURGERY Right     PLANTAR'S WART EXCISION Right     TONSILLECTOMY      WRIST SURGERY Bilateral         01/20/21 1336   PT Last Visit   PT Visit Date 01/20/21   Note Type   Note type Evaluation   Pain Assessment   Pain Assessment Tool 0-10   Pain Score Worst Possible Pain   Pain Location/Orientation Location: Knee;Orientation: Right   Pain Onset/Description Onset: Ongoing;Frequency: Constant/Continuous; Descriptor: Aching;Descriptor: Sore   Effect of Pain on Daily Activities yes   Patient's Stated Pain Goal No pain   Hospital Pain Intervention(s) Medication (See MAR); Repositioned; Ambulation/increased activity;Cold applied   Multiple Pain Sites No   Home Living   Type of [address] [place] [address] Two level;1/2 bath on main level  (1 small SANDY,full flight to 2nd floor)   Bathroom Shower/Tub Walk-in shower   Bathroom Toilet Raised   Bathroom Equipment Grab bars in shower   [address]   (Pt  did not utilize or have and AD PTA  )   Prior Function   Level of [place] Independent with ADLs and functional mobility   Lives With Spouse   ADL Assistance Independent   IADLs Independent   Falls in the last 6 months 0   Vocational Retired   Restrictions/Precautions   [place] Bearing Precautions Per Order Yes   RLE Weight Bearing Per Order FWB   Other Precautions WBS; Fall Risk;Pain;Multiple lines;O2  (scherer catheter)   General   Family/Caregiver Present No   Cognition   Overall Cognitive Status WFL   Arousal/Participation Alert   Orientation Level Oriented X4   Memory Within functional limits   Following Commands Follows one step commands with increased time or repetition   Comments Pt  agreeable to PT assessment  RUE Assessment   RUE Assessment WFL   LUE Assessment   LUE Assessment WFL   RLE Assessment   RLE Assessment X   RLE Overall AROM   R Knee Flexion limited 2/2 pain   R Knee Extension decreased 2/2 pain   Strength RLE   R Hip Flexion 3-/5   R Knee Extension 2+/5   R Knee Flexion 2+/5   R Ankle Dorsiflexion 3/5   LLE Assessment   LLE Assessment WFL   Coordination   Movements are Fluid and Coordinated 0   Coordination and Movement Description Incremental antalgic mobility requiring increased time  Light Touch   RLE Light Touch Grossly intact   LLE Light Touch Grossly intact   Bed Mobility   Supine to Sit   (CGA)   Additional items Assist x 1;Bedrails; Increased time required;Verbal cues   Sit to Supine 4  Minimal assistance   Additional items Assist x 1;Bedrails; Increased time required;Verbal cues;LE management   Additional Comments Upon sitting on bedside ~ 2 minutes, pt  reported feeling "diaphoretic" and was returned BTB  George reported symptom resolvement within 2 minutes of resting supine  Transfers   Sit to Stand Unable to assess  (2/2 safety concerns w/medical complications)   Ambulation/Elevation   Gait pattern Not appropriate; Not tested   Balance   Static Sitting Good   Dynamic Sitting   (unable to safely assess at this time)   Endurance Deficit   Endurance Deficit Yes Endurance Deficit Description Fatigue, pain with functional mobility  Activity Tolerance   Activity Tolerance Patient limited by fatigue;Patient limited by pain;Treatment limited secondary to medical complications (Comment)   Medical Staff Made Aware yes, Nisreen OT & Elsie CM   Nurse Made Aware yes, Radha RN & Claude PCA   Assessment   Prognosis Good   Problem List Decreased strength;Decreased range of motion;Decreased endurance; Impaired balance;Decreased mobility; Decreased coordination;Pain;Orthopedic restrictions;Decreased skin integrity   Assessment Pt is 78 y o  female seen for PT evaluation s/p admit to [address] [place] [address] on 1/20/2021 w/ Degenerative joint disease of right knee  PT consulted to assess pt's functional mobility and d/c needs  Order placed for PT eval and tx, w/ FWB RLE, dangle at bedside, activity-beginning POD#0 order  Comorbidities affecting pt's physical performance at time of assessment include: weakness, DJD R knee s/p R TKA 01/20/2021,HTN, B shoulder impingement, PVCs, cervical radiculopathy  PTA, pt was independent w/ all functional mobility w/ o AD usage  Personal factors affecting pt at time of IE include: ambulating w/ assistive device, stairs to enter home, inability to ambulate household distances, inability to navigate community distances, inability to navigate level surfaces w/o external assistance, unable to perform dynamic tasks in community, unable to perform physical activity, inability to perform IADLs and inability to perform ADLs  Please find objective findings from PT assessment regarding body systems outlined above with impairments and limitations including weakness, decreased ROM, impaired balance, decreased endurance, impaired coordination, pain, decreased activity tolerance, decreased functional mobility tolerance, fall risk, orthopedic restrictions and decreased skin integrity   The following objective measures performed on IE also reveal limitations: Barthel Index: 25/100  Pt's clinical presentation is currently unstable/unpredictable seen in pt's presentation of pain severity, diaphoretic symptoms upon functional mobility, continued post operative management with peripheral anesthesia  Pt to benefit from continued PT tx to address deficits as defined above and maximize level of functional independent mobility and consistency  From PT/mobility standpoint, recommendation at time of d/c would be Home PT with family support pending progress in order to facilitate return to PLOF  Barriers to Discharge Inaccessible home environment; Other (Comment)   Barriers to Discharge Comments Inability to advance functional mobility 2/2 medical complications  Goals   Patient Goals to have less pain   LTG Expiration Date 01/30/21   Long Term Goal #1 1 )Patient will complete bed mobility with supervision of 1 for decrease need for caregiver assistance, decrease burden of care  2 ) Patient will complete transfers with supervision of 1 to decrease risk of falls, facilitate upright standing posture  3 ) RLE strength to greater than/equal to 3+/5 gross musculature to increase ability to safely transfer, control descent to chair  4 ) Patient will exhibit increase static standing balance to Fair+ , for 30-45 seconds without LOB and supervision of 1 to improve activity tolerance  5 ) Patient will exhibit increase dynamic ambulatory balance to Fair+ for  feet w/RW supervision of 1 to improve ability to mobilize to toilet, chair and decrease risk for additional medical complications  6 ) Patient will exhibit good self monitoring and ability to follow 2 step commands to increase complexity of tasks and resume ADL's without LOB  7 )Ascend/descend 1 step,supervision of 1 with handrails to access home environment  PT Treatment Day 0   Plan   Treatment/Interventions Functional transfer training;LE strengthening/ROM; Therapeutic exercise; Endurance training;Patient/family training;Equipment eval/education; Bed mobility;Gait training;Continued evaluation;Spoke to nursing;Spoke to case management;OT   PT Frequency Twice a day   Recommendation   PT Discharge Recommendation Home with skilled therapy; Return to previous environment with social support   Equipment Recommended   (TBD)   PT - OK to Discharge No   Additional Comments Upon conclusion, pt  was resting in bed w/all needs within reach     Barthel Index   Feeding 10   Bathing 0   Grooming Score 0   Dressing Score 5   Bladder Score 0   Bowels Score 10   Toilet Use Score 0   Transfers (Bed/Chair) Score 0   Mobility (Level Surface) Score 0   Stairs Score 0   Barthel Index Score 25     Mindi Can, PT

## 2021-01-20 NOTE — OP NOTE
OPERATIVE REPORT  PATIENT NAME: Jessica Reese    :  1950  MRN: [id number]  Pt Location: [address] [place] [address] OR ROOM 02    SURGERY DATE: 2021    Surgeon(s) and Role:     * Neha Rosario, DO - Primary     * Emelia Wright PA-C - Assisting    Preop Diagnosis:  Primary osteoarthritis of right knee [M17 11]    Post-Op Diagnosis Codes:     * Primary osteoarthritis of right knee [M17 11]    Procedure(s) (LRB):  KNEE TOTAL ARTHROPLASTY (Right) using the Keesha NextGen system with the following sizes:  E LPS GSF femoral component, #3Tibial tray, 10 mm polyarticular surface, and a 35 mm patella button    Specimen(s):  Bone/synovium    Estimated Blood Loss:   50 cc    Drains:  solcotrans    Anesthesia Type:   Spinal with adductor canal block    Operative Indications:  Primary osteoarthritis of right knee [M17 11]      Operative Findings:  TT: 71    Complications:   None    Procedure and Technique:    Procedure and Technique:  The patient was properly identified and brought to the operative suite  After successful induction of the a anesthetic, a tourniquet was placed on the patient's right proximal thigh  The right lower extremity was prepped and draped in the usual usual sterile fashion  It was medically necessary that the physician's assistant be in the room to aid in positioning placing the appropriate amount of retraction the patient  A qualified resident was not available      She was given a dose of intravenous antibiotics  Surgical landmarks were outline  With  a skin marker  An Esmarch was used to examine the right lower extremity and the tourniquet was then inflated  Using a #10  Scalpel  Blade, an anterior incision was made on patient's right knee  Hemostasis was achieved with the use of electrocautery     Through a significant amount of dissection through adipose tissue the capsule and  quadricep tendon  Was  then located    Using a 2nd #10  Scalpel blade a medial parapatellar arthrotomy did occur with a rush of clear synovial fluid  A posteriormedial sleeve was developed to the level of the semi membranous  tendon  The patella then everted and  knee was flexed  The retro and  superior fat pads were excised  The cruciate ligaments were  divided  At this point the proximal tibia could  be anteriorly subluxed  An intramedullary drill hole was placed in the proximal tibia, followed by the external cutting jig  The  smallest portion of deficient medial side bone was to  be removed, with its corresponding lateral side  Varus and  valgus angulation was also checked  The collateral ligaments were then  protected  The neurovascular bundle was then protected  The proximal tibia was then osteotomized  Attention then paid to the preparation of distal femur  The intramedullary levon was placed with a 6 degree valgus cut placed approximately 3° of external rotation  The external  cutting jig was placed on top of this  An additional 2 mm cut the because of a flexion contracture  The collateral ligaments were protected  The  distal femur was then osteotomized  The femur was then sized  A size E did have the best fit  Both gender and  standard cutting blocks were checked and a gender specific  cutting block did  Have the best fit  Without any excessive notching of the anterior condyle  The collaterals and then protected, the distal femur was an ostial optimized following sequence 1 anterior condyle 2  posterior condyle 3 posterior chamfer and 4 anterior chamfer  At this point all the bone fragments and then removed  A lamina  was placed both the medial and lateral sides and the redundant menisci and posterior osteophytes were then removed  Flexion-extension blocks were placed next and a 10 mm block gave good symmetric balancing  The femur was then finished with the trochlear recess and notch block placed in a slightly lateral position to facilitate tracking patella  The tibial was sized next    A size #3 did have  best fit  The size E femoral component placed, followed by a number 3  Tibial tray, followed by several polyarticular surfaces and a 10 mm tray did the best fit  The rotation of the  tibia was then marked  The patella was inspected next  There was off tremendous amount of arthrosis  The  peripheral osteophytes removed with an removed  With that confirmed a caliper that there is adequate bone stock  And then using his injuries Keesha reaming system approximately 9 mm of undersurface patella then reamed  The patella sized to a 35 mm patella button  This guide was drilled in a slightly superior medial position facilitate tracking patella  The tibia was then finished with a drill hole drill hole and keel punch  At this point there is there is good stability and range of motion in the knee  All the trial components removed  The wound was copiously irrigated sterile antibiotic solution  Cement  was being mixed on the back table was used 3rd generation cementing techniques  The proximal tibia was cemented in placed followed by the distal femur  A trial poly articular surface was placed till the cement fully cured  The patella was  cemented in place and held with a patellar clamp till the cement fully cured as well  Once the cement fully cured, it was irrigated  The polyarticular surfaces were  tried once again and a 10 mm tray to the best fit  The final 10 mm tray was placed reduced 1 final time and found to be quite stable  After it was irrigated,  a quarter-inch solcotrans  Was  placed near the superior aspect  Of the incision  The quadriceps  and capsule were closed with #1 Vicryl  Deep layer fascia closed multiple layers of 0 Vicryl  Superficial was closed with 2-0 Vicryl  The skin was approximated  With staples  The wounds were dressed with ointment Xeroform 4x4s ABDs sterile Webril and Ace bandage  There was no complication during procedure   She was successfully awoken, transferred  To the hospital bed,  And went to the recovery room stable  In condition              I was present for the entire procedure, A qualified resident physician was not available and A physician assistant was required during the procedure for retraction tissue handling,dissection and suturing    Patient Disposition:  PACU     SIGNATURE: Quincy Chase DO  DATE: January 20, 2021  TIME: 10:06 AM

## 2021-01-20 NOTE — CONSULTS
Consult- Corbin Lindo 1950, 78 y o  female MRN: [id number]  Unit/Bed#: -01 Encounter: [id number]  Primary Care Provider: Carri Winn MD   Date and time admitted to hospital: 1/20/2021  7:54 AM      Inpatient consult to Internal Medicine  Consult performed by: Sebastian Diaz DO  Consult ordered by: Eileen Berumen PA-C      ASSESSMENT AND PLAN  * Degenerative joint disease of right knee  Assessment & Plan  · Osteoarthritis right knee status post right total arthroplasty  · Pain control per primary service    GERD (gastroesophageal reflux disease)  Assessment & Plan  · GERD continue PPI    Hyperlipidemia  Assessment & Plan  · Hyperlipidemia can resume omega three after discharge    Hypertension  Assessment & Plan  · Essential hypertension continue metoprolol succinate 25 mg daily    PVC's (premature ventricular contractions)  Assessment & Plan  · Frequent PVCs continue metoprolol succinate 25 mg daily    Thank you for this consultation, we will follow along with you during this hospitalization  _____________________________________________________________________________    HPI: Corbin Lindo is a relatively healthy 78y o  year old female with a history of hypertension who presents for elective right knee total arthroplasty  She is doing well postprocedure and denies any chest pain or shortness of breath but does have some vomiting and expected joint pains  With exception of PVC she denies any underlying history of heart disease  ALLERGIES  Allergies   Allergen Reactions    Levaquin [Levofloxacin] Hives    Aspirin Rash    Cephalosporins Rash and Other (See Comments)     rash    Dilaudid [Hydromorphone] Nausea Only     And sweaty    Keflex [Cephalexin] Rash    Lansoprazole Other (See Comments)     rash    Penicillins Rash    Tetracycline Rash     HOME MEDICATIONS  Prior to Admission Medications   Prescriptions Last Dose Informant Patient Reported? Taking?    Calcium Carbonate-Vitamin D (CALCIUM 600/VITAMIN D PO) 1/19/2021  Yes Yes   Sig: Take by mouth tues/thursday   Cholecalciferol (Vitamin D3) 50 MCG (2000 UT) TABS 1/19/2021  Yes Yes   Sig: Take 2,000 Units by mouth Monday/wednesday/friday   Multiple Vitamins-Minerals (multivitamin with minerals) tablet   No Yes   Sig: Take 1 tablet by mouth daily   Omega-3 Fatty Acids (Ultra Omega-3 Fish Oil) 1400 MG CAPS 1/19/2021 at Unknown time  Yes Yes   Sig: Take by mouth   ascorbic acid (VITAMIN C) 500 MG tablet   No Yes   Sig: Take 1 tablet (500 mg total) by mouth 2 (two) times a day   calcium carbonate-vitamin D (OSCAL-D) 500 mg-200 units per tablet 1/19/2021  Yes Yes   Sig: Take 1 tablet by mouth daily with breakfast   ferrous sulfate 324 (65 Fe) mg 1/19/2021  No Yes   Sig: TAKE 1 TABLET (324 MG TOTAL) BY MOUTH 2 (TWO) TIMES A DAY BEFORE MEALS   folic acid (FOLVITE) 1 mg tablet 1/19/2021  No Yes   Sig: TAKE 1 TABLET BY MOUTH EVERY DAY   magnesium oxide (MAG-OX) 400 mg 1/19/2021  Yes Yes   Sig: Take 400 mg by mouth daily   metoprolol succinate (TOPROL-XL) 25 mg 24 hr tablet 1/20/2021 at 0630  Yes Yes   Sig: Take 25 mg by mouth daily   omeprazole (PriLOSEC) 20 mg delayed release capsule 1/20/2021 at 0630 Self Yes Yes   Sig: Take 20 mg by mouth as needed      Facility-Administered Medications: None     CURRENT MEDICATIONS  Current Facility-Administered Medications   Medication Dose Route Frequency Provider Last Rate Last Admin    acetaminophen (TYLENOL) tablet 650 mg  650 mg Oral Q4H PRN Siddharth Martin PA-C        aluminum-magnesium hydroxide-simethicone (MYLANTA) oral suspension 30 mL  30 mL Oral Q6H PRN Siddharth Martin PA-C        ascorbic acid (VITAMIN C) tablet 500 mg  500 mg Oral BID Siddharth Martin PA-C        [START ON 1/21/2021] calcium carbonate-vitamin D (OSCAL-D) 500 mg-200 units per tablet 1 tablet  1 tablet Oral Daily With Breakfast Siddharth Martin PA-C        cholecalciferol (VITAMIN D3) tablet 2,000 Units 2,000 Units Oral Daily [facility], PA-C        docusate sodium (COLACE) capsule 100 mg  100 mg Oral BID [facility], PA-C        ferrous sulfate tablet 325 mg  325 mg Oral BID With Meals [facility], PA-C        folic acid (FOLVITE) tablet 1 mg  1 mg Oral Daily [facility], PA-C        [START ON 1/21/2021] fondaparinux (ARIXTRA) subcutaneous injection 2 5 mg  2 5 mg Subcutaneous Daily [facility], PA-C        gentamicin (GARAMYCIN) IVPB (premix) 80 mg 100 mL  80 mg Intravenous Q8H [facility], PA-C        lactated ringers bolus 1,000 mL  1,000 mL Intravenous Once PRN [facility], PA-C        And    lactated ringers bolus 1,000 mL  1,000 mL Intravenous Once PRN [facility], PA-C        magnesium oxide (MAG-OX) tablet 400 mg  400 mg Oral Daily [facility], PA-C        [START ON 1/21/2021] metoprolol succinate (TOPROL-XL) 24 hr tablet 25 mg  25 mg Oral Daily [facility], PA-C        morphine (PF) 4 mg/mL injection 3 mg  3 mg Intravenous Q4H PRN [facility], PA-C        multivitamin-minerals (CENTRUM) tablet 1 tablet  1 tablet Oral Daily [facility], PA-C        ondansetron (ZOFRAN) injection 4 mg  4 mg Intravenous Q6H PRN [facility], PA-C        oxyCODONE-acetaminophen (PERCOCET) 5-325 mg per tablet 1 tablet  1 tablet Oral Q4H PRN [facility], PA-C        [START ON 1/21/2021] pantoprazole (PROTONIX) EC tablet 40 mg  40 mg Oral Early Morning [facility], PA-C        sodium chloride 0 9 % bolus 1,000 mL  1,000 mL Intravenous Once PRN [facility], PA-C        And    sodium chloride 0 9 % bolus 1,000 mL  1,000 mL Intravenous Once PRN [facility], PA-C        vancomycin (VANCOCIN) IVPB (premix in dextrose) 500 mg 100 mL  500 mg Intravenous Q12H [facility], PA-C         PAST HISTORY  Past Medical History:   Diagnosis Date    Cardiac murmur due to mitral valve disorder     Hyperlipidemia     Hypertension     PVC's (premature ventricular contractions)      Past Surgical History:   Procedure Laterality Date    CARPAL TUNNEL RELEASE Bilateral     CATARACT EXTRACTION Bilateral     KNEE SURGERY Right     PLANTAR'S WART EXCISION Right     TONSILLECTOMY      WRIST SURGERY Bilateral      SOCIAL HISTORY  Social History     Socioeconomic History    Marital status: /Civil Union     Spouse name: Not on file    Number of children: Not on file    Years of education: Not on file    Highest education level: Not on file   Occupational History    Not on file   Social Needs    Financial resource strain: Not on file    Food insecurity     Worry: Not on file     Inability: Not on file   [employer] needs     Medical: Not on file     Non-medical: Not on file   Tobacco Use    Smoking status: Never Smoker    Smokeless tobacco: Never Used   Substance and Sexual Activity    Alcohol use: Yes     Comment: occasional    Drug use: Never    Sexual activity: Not on file   Lifestyle    Physical activity     Days per week: Not on file     Minutes per session: Not on file    Stress: Not on file   Relationships    Social connections     Talks on phone: Not on file     Gets together: Not on file     Attends [employer] service: Not on file     Active member of club or organization: Not on file     Attends meetings of clubs or organizations: Not on file     Relationship status: Not on file    Intimate partner violence     Fear of current or ex partner: Not on file     Emotionally abused: Not on file     Physically abused: Not on file     Forced sexual activity: Not on file   Other Topics Concern    Not on file   Social History Narrative    Not on file     FAMILY HISTORY  Family History   Problem Relation Age of Onset    Heart disease Mother     Hypertension Mother     Hyperlipidemia Mother     Heart disease Father     Hypertension Father     Hyperlipidemia Father     Cancer Sister        REVIEW OF SYSTEMS  Review of Systems Constitutional: Negative for chills, diaphoresis and fever  HENT: Negative for dental problem and facial swelling  Eyes: Negative for photophobia, pain and visual disturbance  Respiratory: Negative for shortness of breath, wheezing and stridor  Cardiovascular: Negative for chest pain and palpitations  Gastrointestinal: Positive for nausea  Negative for abdominal distention, abdominal pain, diarrhea and vomiting  Genitourinary: Negative for dysuria, hematuria and urgency  Musculoskeletal: Positive for arthralgias  Negative for back pain and myalgias  Skin: Negative for rash  Neurological: Negative for dizziness, seizures, speech difficulty, numbness and headaches  Psychiatric/Behavioral: Negative for agitation and suicidal ideas  The patient is not nervous/anxious  All other systems reviewed and are negative  OBJECTIVE  Temp:  [96 °F (35 6 °C)-98 7 °F (37 1 °C)] 96 °F (35 6 °C)  HR:  [] 69  Resp:  [16-22] 18  BP: (134-161)/(64-80) 138/65    PHYSICAL EXAM  Physical Exam  Vitals signs reviewed  Constitutional:       General: She is not in acute distress  Appearance: Normal appearance  HENT:      Head: Atraumatic  Mouth/Throat:      Mouth: Mucous membranes are moist       Pharynx: Oropharynx is clear  Eyes:      General: No scleral icterus  Extraocular Movements: Extraocular movements intact  Cardiovascular:      Rate and Rhythm: Regular rhythm  Heart sounds: Normal heart sounds  Pulmonary:      Breath sounds: Normal breath sounds  No wheezing  Abdominal:      General: Bowel sounds are normal       Palpations: Abdomen is soft  Tenderness: There is no guarding or rebound  Musculoskeletal:         General: Tenderness (Right knee) present  No swelling  Skin:     General: Skin is warm  Neurological:      Mental Status: She is alert and oriented to person, place, and time  Mental status is at baseline     Psychiatric:         Mood and Affect: Mood normal            Lab Results:   No results found  Imaging: I have personally reviewed pertinent films in PACS  Xr Chest Pa & Lateral  Result Date: 1/5/2021  Impression: No acute cardiopulmonary disease  Workstation performed: [id number]       Total Time for Visit, including Counseling / Coordination of Care: x mins  Greater than 50% of this total time spent on direct patient counseling and coordination of care  ** Please Note: This note has been constructed using a voice recognition system   **

## 2021-01-20 NOTE — PLAN OF CARE
Problem: PHYSICAL THERAPY ADULT  Goal: Performs mobility at highest level of function for planned discharge setting  See evaluation for individualized goals  Description: Treatment/Interventions: Functional transfer training, LE strengthening/ROM, Therapeutic exercise, Endurance training, Patient/family training, Equipment eval/education, Bed mobility, Gait training, Continued evaluation, Spoke to nursing, Spoke to case management, OT  Equipment Recommended: (TBD)       See flowsheet documentation for full assessment, interventions and recommendations  Note: Prognosis: Good  Problem List: Decreased strength, Decreased range of motion, Decreased endurance, Impaired balance, Decreased mobility, Decreased coordination, Pain, Orthopedic restrictions, Decreased skin integrity  Assessment: Pt is 78 y o  female seen for PT evaluation s/p admit to [address] [place] [address] on 1/20/2021 w/ Degenerative joint disease of right knee  PT consulted to assess pt's functional mobility and d/c needs  Order placed for PT eval and tx, w/ FWB RLE, dangle at bedside, activity-beginning POD#0 order  Comorbidities affecting pt's physical performance at time of assessment include: weakness, DJD R knee s/p R TKA 01/20/2021,HTN, B shoulder impingement, PVCs, cervical radiculopathy  PTA, pt was independent w/ all functional mobility w/ o AD usage  Personal factors affecting pt at time of IE include: ambulating w/ assistive device, stairs to enter home, inability to ambulate household distances, inability to navigate community distances, inability to navigate level surfaces w/o external assistance, unable to perform dynamic tasks in community, unable to perform physical activity, inability to perform IADLs and inability to perform ADLs   Please find objective findings from PT assessment regarding body systems outlined above with impairments and limitations including weakness, decreased ROM, impaired balance, decreased endurance, impaired coordination, pain, decreased activity tolerance, decreased functional mobility tolerance, fall risk, orthopedic restrictions and decreased skin integrity  The following objective measures performed on IE also reveal limitations: Barthel Index: 25/100  Pt's clinical presentation is currently unstable/unpredictable seen in pt's presentation of pain severity, diaphoretic symptoms upon functional mobility, continued post operative management with peripheral anesthesia  Pt to benefit from continued PT tx to address deficits as defined above and maximize level of functional independent mobility and consistency  From PT/mobility standpoint, recommendation at time of d/c would be Home PT with family support pending progress in order to facilitate return to PLOF  Barriers to Discharge: Inaccessible home environment, Other (Comment)  Barriers to Discharge Comments: Inability to advance functional mobility 2/2 medical complications  PT Discharge Recommendation: (S) Home with skilled therapy, Return to previous environment with social support     PT - OK to Discharge: No    See flowsheet documentation for full assessment

## 2021-01-20 NOTE — ASSESSMENT & PLAN NOTE
· Osteoarthritis right knee status post right total arthroplasty    · Pain control per primary service

## 2021-01-20 NOTE — ANESTHESIA PREPROCEDURE EVALUATION
Procedure:  KNEE TOTAL ARTHROPLASTY (Right Knee)    Relevant Problems   MUSCULOSKELETAL   (+) Degenerative joint disease of right knee   (+) Primary osteoarthritis of both knees      Other   (+) Impingement syndrome of left shoulder   (+) Impingement syndrome of right shoulder   (+) Radiculopathy, cervical region        Physical Exam    Airway    Mallampati score: II  TM Distance: >3 FB  Neck ROM: full     Dental   No notable dental hx     Cardiovascular  Cardiovascular exam normal    Pulmonary  Pulmonary exam normal Breath sounds clear to auscultation,     Other Findings        Anesthesia Plan  ASA Score- 2     Anesthesia Type- regional and spinal with ASA Monitors  Additional Monitors:   Airway Plan:           Plan Factors-Exercise tolerance (METS): >4 METS  Chart reviewed  EKG reviewed  Imaging results reviewed  Existing labs reviewed  Patient summary reviewed  Patient is not a current smoker  Patient instructed to abstain from smoking on day of procedure  Patient did not smoke on day of surgery  Obstructive sleep apnea risk education given perioperatively  Induction- intravenous  Postoperative Plan- Plan for postoperative opioid use  Informed Consent- Anesthetic plan and risks discussed with patient  I personally reviewed this patient with the CRNA  Discussed and agreed on the Anesthesia Plan with the CRNA             Lab Results   Component Value Date    WBC 5 30 01/04/2021    HGB 13 8 01/04/2021    HCT 41 2 (L) 01/04/2021    MCV 91 01/04/2021     01/04/2021     Lab Results   Component Value Date    CALCIUM 9 2 01/04/2021    K 4 1 01/04/2021    CO2 27 01/04/2021     01/04/2021    BUN 14 01/04/2021    CREATININE 0 57 (L) 01/04/2021     Lab Results   Component Value Date    INR 1 00 01/04/2021    PROTIME 13 1 01/04/2021     Lab Results   Component Value Date    PTT 29 01/04/2021     Type and Screen:  O        Discussed with pt the benefits/alternatives and risks or General Anesthesia including breathing tube remaining in place if not strong enough, PONV, damage to lips and teeth, sore throat, eye injury or blindness    I, Dr Ivone Monsalve, the attending physician, have personally seen and evaluated the patient prior to anesthetic care  I have reviewed the pre-anesthetic record, and other medical records if appropriate to the anesthetic care  If a CRNA is involved in the case, I have reviewed the CRNA assessment, if present, and agree  The patient is in a suitable condition to proceed with my formulated anesthetic plan

## 2021-01-20 NOTE — INTERVAL H&P NOTE
H&P reviewed  After examining the patient I find no changes in the patients condition since the H&P had been written      Vitals:    01/20/21 0814   BP: 152/80   Pulse: 83   Resp: 18   Temp: 97 5 °F (36 4 °C)   SpO2: 97%

## 2021-01-20 NOTE — PLAN OF CARE
Problem: Prexisting or High Potential for Compromised Skin Integrity  Goal: Skin integrity is maintained or improved  Description: INTERVENTIONS:  - Identify patients at risk for skin breakdown  - Assess and monitor skin integrity  - Assess and monitor nutrition and hydration status  - Monitor labs   - Assess for incontinence   - Turn and reposition patient  - Assist with mobility/ambulation  - Relieve pressure over bony prominences  - Avoid friction and shearing  - Provide appropriate hygiene as needed including keeping skin clean and dry  - Evaluate need for skin moisturizer/barrier cream  - Collaborate with interdisciplinary team   - Patient/family teaching  - Consider wound care consult   1/20/2021 1801 by Sarah Vidal RN  Outcome: Progressing  1/20/2021 1801 by Sarah Vidal RN  Outcome: Not Progressing     Problem: Potential for Falls  Goal: Patient will remain free of falls  Description: INTERVENTIONS:  - Assess patient frequently for physical needs  -  Identify cognitive and physical deficits and behaviors that affect risk of falls    -  [place] fall precautions as indicated by assessment   - Educate patient/family on patient safety including physical limitations  - Instruct patient to call for assistance with activity based on assessment  - Modify environment to reduce risk of injury  - Consider OT/PT consult to assist with strengthening/mobility  1/20/2021 1801 by Sarah Vidal RN  Outcome: [facility]  1/20/2021 1801 by Sarah Vidal RN  Outcome: Not Progressing     Problem: PAIN - ADULT  Goal: Verbalizes/displays adequate comfort level or baseline comfort level  Description: Interventions:  - Encourage patient to monitor pain and request assistance  - Assess pain using appropriate pain scale  - Administer analgesics based on type and severity of pain and evaluate response  - Implement non-pharmacological measures as appropriate and evaluate response  - Consider cultural and This note was copied from the mother's chart.  P4. Patient anticipating discharge today. Breast are starting to fill. The APRN assisted her with deep latch and she was amazed at the comfort and baby nursed a full thirty minutes. Reviewed nipple care and comfort measures for engorgement. Has LC contact information and enc to follow-up as this is her first time to breastfeed.   social influences on pain and pain management  - Notify physician/advanced practitioner if interventions unsuccessful or patient reports new pain  1/20/2021 1801 by Jamison Adame RN  Outcome: Progressing  1/20/2021 1801 by Jamison Adame RN  Outcome: Not Progressing     Problem: INFECTION - ADULT  Goal: Absence or prevention of progression during hospitalization  Description: INTERVENTIONS:  - Assess and monitor for signs and symptoms of infection  - Monitor lab/diagnostic results  - Monitor all insertion sites, i e  indwelling lines, tubes, and drains  - Monitor endotracheal if appropriate and nasal secretions for changes in amount and color  - [place] appropriate cooling/warming therapies per order  - Administer medications as ordered  - Instruct and encourage patient and family to use good hand hygiene technique  - Identify and instruct in appropriate isolation precautions for identified infection/condition  1/20/2021 1801 by Jamison Adame RN  Outcome: Progressing  1/20/2021 1801 by Jamison Adame RN  Outcome: Not Progressing  Goal: Absence of fever/infection during neutropenic period  Description: INTERVENTIONS:  - Monitor WBC    1/20/2021 1801 by Jamison Adame RN  Outcome: Progressing  1/20/2021 1801 by Jamison Adame RN  Outcome: Not Progressing     Problem: SAFETY ADULT  Goal: Patient will remain free of falls  Description: INTERVENTIONS:  - Assess patient frequently for physical needs  -  Identify cognitive and physical deficits and behaviors that affect risk of falls    -  [place] fall precautions as indicated by assessment   - Educate patient/family on patient safety including physical limitations  - Instruct patient to call for assistance with activity based on assessment  - Modify environment to reduce risk of injury  - Consider OT/PT consult to assist with strengthening/mobility  1/20/2021 1801 by Jamison Adame RN  Outcome: Progressing  1/20/2021 1801 by Burns Castleman AYANNA Chow  Outcome: Not Progressing  Goal: Maintain or return to baseline ADL function  Description: INTERVENTIONS:  -  Assess patient's ability to carry out ADLs; assess patient's baseline for ADL function and identify physical deficits which impact ability to perform ADLs (bathing, care of mouth/teeth, toileting, grooming, dressing, etc )  - Assess/evaluate cause of self-care deficits   - Assess range of motion  - Assess patient's mobility; develop plan if impaired  - Assess patient's need for assistive devices and provide as appropriate  - Encourage maximum independence but intervene and supervise when necessary  - Involve family in performance of ADLs  - Assess for home care needs following discharge   - Consider OT consult to assist with ADL evaluation and planning for discharge  - Provide patient education as appropriate  1/20/2021 1801 by Saman Velez RN  Outcome: Progressing  1/20/2021 1801 by Saman Velez RN  Outcome: Not Progressing  Goal: Maintain or return mobility status to optimal level  Description: INTERVENTIONS:  - Assess patient's baseline mobility status (ambulation, transfers, stairs, etc )    - Identify cognitive and physical deficits and behaviors that affect mobility  - Identify mobility aids required to assist with transfers and/or ambulation (gait belt, sit-to-stand, lift, walker, cane, etc )  - [place] fall precautions as indicated by assessment  - Record patient progress and toleration of activity level on Mobility SBAR; progress patient to next Phase/Stage  - Instruct patient to call for assistance with activity based on assessment  - Consider rehabilitation consult to assist with strengthening/weightbearing, etc   1/20/2021 1801 by Saman Velez RN  Outcome: Progressing  1/20/2021 1801 by Saman Velez RN  Outcome: Not Progressing     Problem: DISCHARGE PLANNING  Goal: Discharge to home or other facility with appropriate resources  Description: INTERVENTIONS:  - Identify barriers to discharge w/patient and caregiver  - Arrange for needed discharge resources and transportation as appropriate  - Identify discharge learning needs (meds, wound care, etc )  - Arrange for interpretive services to assist at discharge as needed  - Refer to Case Management Department for coordinating discharge planning if the patient needs post-hospital services based on physician/advanced practitioner order or complex needs related to functional status, cognitive ability, or social support system  1/20/2021 1801 by Sam Peres RN  Outcome: Progressing  1/20/2021 1801 by Sam Peres RN  Outcome: Not Progressing     Problem: Knowledge Deficit  Goal: Patient/family/caregiver demonstrates understanding of disease process, treatment plan, medications, and discharge instructions  Description: Complete learning assessment and assess knowledge base    Interventions:  - Provide teaching at level of understanding  - Provide teaching via preferred learning methods  1/20/2021 1801 by Sam Peres RN  Outcome: Progressing  1/20/2021 1801 by Sam Peres RN  Outcome: Not Progressing     Problem: CARDIOVASCULAR - ADULT  Goal: Maintains optimal cardiac output and hemodynamic stability  Description: INTERVENTIONS:  - Monitor I/O, vital signs and rhythm  - Monitor for S/S and trends of decreased cardiac output  - Administer and titrate ordered vasoactive medications to optimize hemodynamic stability  - Assess quality of pulses, skin color and temperature  - Assess for signs of decreased coronary artery perfusion  - Instruct patient to report change in severity of symptoms  1/20/2021 1801 by Sam Peres RN  Outcome: Progressing  1/20/2021 1801 by Sam Peres RN  Outcome: Not Progressing  Goal: Absence of cardiac dysrhythmias or at baseline rhythm  Description: INTERVENTIONS:  - Continuous cardiac monitoring, vital signs, obtain 12 lead EKG if ordered  - Administer antiarrhythmic and heart rate control medications as ordered  - Monitor electrolytes and administer replacement therapy as ordered  1/20/2021 1801 by Jamison Adame RN  Outcome: Progressing  1/20/2021 1801 by Jamison Adame RN  Outcome: Not Progressing     Problem: METABOLIC, FLUID AND ELECTROLYTES - ADULT  Goal: Electrolytes maintained within normal limits  Description: INTERVENTIONS:  - Monitor labs and assess patient for signs and symptoms of electrolyte imbalances  - Administer electrolyte replacement as ordered  - Monitor response to electrolyte replacements, including repeat lab results as appropriate  - Instruct patient on fluid and nutrition as appropriate  1/20/2021 1801 by Jamison Adame RN  Outcome: Progressing  1/20/2021 1801 by Jamison Adame RN  Outcome: Not Progressing  Goal: Fluid balance maintained  Description: INTERVENTIONS:  - Monitor labs   - Monitor I/O and WT  - Instruct patient on fluid and nutrition as appropriate  - Assess for signs & symptoms of volume excess or deficit  1/20/2021 1801 by Jamison Adame RN  Outcome: Progressing  1/20/2021 1801 by Jamison Adame RN  Outcome: Not Progressing  Goal: Glucose maintained within target range  Description: INTERVENTIONS:  - Monitor Blood Glucose as ordered  - Assess for signs and symptoms of hyperglycemia and hypoglycemia  - Administer ordered medications to maintain glucose within target range  - Assess nutritional intake and initiate nutrition service referral as needed  1/20/2021 1801 by Jamison Adame RN  Outcome: Progressing  1/20/2021 1801 by Jamison Adame RN  Outcome: Not Progressing     Problem: SKIN/TISSUE INTEGRITY - ADULT  Goal: Skin integrity remains intact  Description: INTERVENTIONS  - Identify patients at risk for skin breakdown  - Assess and monitor skin integrity  - Assess and monitor nutrition and hydration status  - Monitor labs (i e  albumin)  - Assess for incontinence   - Turn and reposition patient  - Assist with mobility/ambulation  - Relieve pressure over bony prominences  - Avoid friction and shearing  - Provide appropriate hygiene as needed including keeping skin clean and dry  - Evaluate need for skin moisturizer/barrier cream  - Collaborate with interdisciplinary team (i e  Nutrition, Rehabilitation, etc )   - Patient/family teaching  1/20/2021 1801 by Jr Wilde RN  Outcome: Progressing  1/20/2021 1801 by Jr Wilde RN  Outcome: Not Progressing  Goal: Incision(s), wounds(s) or drain site(s) healing without S/S of infection  Description: INTERVENTIONS  - Assess and document risk factors for skin impairment   - Assess and document dressing, incision, wound bed, drain sites and surrounding tissue  - Consider nutrition services referral as needed  - Oral mucous membranes remain intact  - Provide patient/ family education  1/20/2021 1801 by Jr Wilde RN  Outcome: Progressing  1/20/2021 1801 by Jr Wilde RN  Outcome: Not Progressing  Goal: Oral mucous membranes remain intact  Description: INTERVENTIONS  - Assess oral mucosa and hygiene practices  - Implement preventative oral hygiene regimen  - Implement oral medicated treatments as ordered  - Initiate Nutrition services referral as needed  1/20/2021 1801 by Jr Wilde RN  Outcome: Progressing  1/20/2021 1801 by Jr Wilde RN  Outcome: Not Progressing     Problem: MUSCULOSKELETAL - ADULT  Goal: Maintain or return mobility to safest level of function  Description: INTERVENTIONS:  - Assess patient's ability to carry out ADLs; assess patient's baseline for ADL function and identify physical deficits which impact ability to perform ADLs (bathing, care of mouth/teeth, toileting, grooming, dressing, etc )  - Assess/evaluate cause of self-care deficits   - Assess range of motion  - Assess patient's mobility  - Assess patient's need for assistive devices and provide as appropriate  - Encourage maximum independence but intervene and supervise when necessary  - Involve family in performance of ADLs  - Assess for home care needs following discharge   - Consider OT consult to assist with ADL evaluation and planning for discharge  - Provide patient education as appropriate  1/20/2021 1801 by Raul Salazar RN  Outcome: Progressing  1/20/2021 1801 by Raul Salazar RN  Outcome: Not Progressing  Goal: Maintain proper alignment of affected body part  Description: INTERVENTIONS:  - Support, maintain and protect limb and body alignment  - Provide patient/ family with appropriate education  1/20/2021 1801 by Raul Salazar RN  Outcome: Progressing  1/20/2021 1801 by Raul Salazar RN  Outcome: Not Progressing

## 2021-01-20 NOTE — ANESTHESIA PROCEDURE NOTES
Spinal Block    Patient location during procedure: OR  Start time: 1/20/2021 10:06 AM  Reason for block: at surgeon's request and primary anesthetic  Staffing  Anesthesiologist: Sapphire Martinez MD  Resident/CRNA: Robby Taylor CRNA  Performed: resident/CRNA   Preanesthetic Checklist  Completed: patient identified, site marked, surgical consent, pre-op evaluation, timeout performed, IV checked, risks and benefits discussed and monitors and equipment checked  Spinal Block  Patient position: sitting  Prep: Betadine  Patient monitoring: heart rate, cardiac monitor, continuous pulse ox and frequent blood pressure checks  Approach: midline  Location: L3-4  Injection technique: single-shot  Needle  Needle type: pencil-tip   Needle gauge: 24 G  Needle length: 5 cm  Assessment  Events: cerebrospinal fluid  Injection Assessment:  negative aspiration for heme, positive aspiration for clear CSF and no paresthesia on injection    Post-procedure:  site cleaned

## 2021-01-20 NOTE — ANESTHESIA PROCEDURE NOTES
Peripheral Block    Patient location during procedure: pre-op  Start time: 1/20/2021 9:24 AM  Reason for block: procedure for pain, at surgeon's request and post-op pain management  Staffing  Anesthesiologist: Denny So MD  Performed: anesthesiologist   Preanesthetic Checklist  Completed: patient identified, site marked, surgical consent, pre-op evaluation, timeout performed, IV checked, risks and benefits discussed and monitors and equipment checked  Peripheral Block  Patient position: supine  Prep: ChloraPrep  Patient monitoring: cardiac monitor, continuous pulse ox, frequent blood pressure checks and heart rate  Block type: adductor canal block  Laterality: right  Injection technique: single-shot  Ultrasound permanent image savedropivacaine (NAROPIN) 0 5 % perineural infiltration, 25 mL  Needle  Needle type: Stimuplex   Needle gauge: 22 G  Needle length: 10 cm  Needle localization: anatomical landmarks and ultrasound guidance  Needle insertion depth: 6 cm  Test dose: negative  Assessment  Injection assessment: incremental injection, local visualized surrounding nerve on ultrasound, negative aspiration for heme and no paresthesia on injection  Heart rate change: no  Slow fractionated injection: yes  Post-procedure:  site cleaned  patient tolerated the procedure well with no immediate complications

## 2021-01-20 NOTE — OCCUPATIONAL THERAPY NOTE
Occupational Therapy Evaluation      Elisabet Medeiros    1/20/2021    Principal Problem:    Degenerative joint disease of right knee  Active Problems:    Impingement syndrome of left shoulder    Impingement syndrome of right shoulder    Radiculopathy, cervical region    Primary osteoarthritis of both knees    PVC's (premature ventricular contractions)    Hypertension    Hyperlipidemia    GERD (gastroesophageal reflux disease)      Past Medical History:   Diagnosis Date    Cardiac murmur due to mitral valve disorder     GERD (gastroesophageal reflux disease)     Hyperlipidemia     Hypertension     PVC's (premature ventricular contractions)        Past Surgical History:   Procedure Laterality Date    CARPAL TUNNEL RELEASE Bilateral     CATARACT EXTRACTION Bilateral     KNEE SURGERY Right     PLANTAR'S WART EXCISION Right     TONSILLECTOMY      WRIST SURGERY Bilateral         01/20/21 1347   OT Last Visit   OT Visit Date 01/20/21   Note Type   Note type Evaluation   Restrictions/Precautions   Weight Bearing Precautions Per Order Yes   RLE Weight Bearing Per Order FWB   Other Precautions WBS; Fall Risk;Pain;Multiple lines  (+scherer)   Pain Assessment   Pain Assessment Tool 0-10   Pain Score Worst Possible Pain   Pain Location/Orientation Orientation: Right;Location: Knee   Pain Onset/Description Onset: Ongoing;Frequency: Constant/Continuous; Descriptor: Aching;Descriptor: Sore   Effect of Pain on Daily Activities yes   Patient's Stated Pain Goal No pain   Hospital Pain Intervention(s) Medication (See MAR); Repositioned   Multiple Pain Sites No   Home Living   Type of [address] [place] [address] Two level;1/2 bath on main level  (1 small SANDY, full flight to 2nd floor)   Bathroom Shower/Tub Walk-in shower   Bathroom Toilet Raised   Bathroom Equipment Grab bars in shower   [address]   (none PTA)   Prior Function   Level of [place] Independent with ADLs and functional mobility Lives With Spouse   ADL Assistance Independent   IADLs Independent   Falls in the last 6 months 0   Vocational Retired   ADL   UB Bathing Assistance 5  Supervision/Setup   LB [address] 3  Moderate Assistance   [address] 5  Supervision/Setup    [address] 1  Total Assistance   Bed Mobility   Supine to Sit   (CGA)   Additional items Assist x 1;HOB elevated; Bedrails; Increased time required;Verbal cues   Sit to Supine 4  Minimal assistance   Additional items Assist x 1;Bedrails; Increased time required;Verbal cues;LE management   Additional Comments Upon sitting on bedside ~ 2 minutes, pt  reported feeling "diaphoretic" and was returned BTB  Nell Lorenz reported symptom resolvement within 2 minutes of resting supine  Transfers   Sit to Stand Unable to assess  (2/2 medical status)   Balance   Static Sitting Good   Dynamic Sitting   (unable to safely assess at this time)   Activity Tolerance   Activity Tolerance Patient limited by pain;Treatment limited secondary to medical complications (Comment)   Medical Staff Made Aware CM Elsie   Nurse Made Aware RN Paula Ratel   RUE Assessment   RUE Assessment WFL   LUE Assessment   LUE Assessment WFL   Hand Function   Gross Motor Coordination Functional   Fine Motor Coordination Functional   Cognition   Overall Cognitive Status WFL   Arousal/Participation Alert; Cooperative   Attention Within functional limits   Orientation Level Oriented X4   Memory Within functional limits   Following Commands Follows one step commands with increased time or repetition   Assessment   Limitation Decreased ADL status; Decreased Safe judgement during ADL;Decreased endurance;Decreased self-care trans;Decreased high-level ADLs   Prognosis Good   Assessment Pt is a 78 y o  female seen for OT evaluation s/p admit to [facility] on 1/20/2021 w/ Degenerative joint disease of right knee    Comorbidities affecting pt's functional performance at time of assessment include: OA, DJD, HTN, Hyperlipidemia, GERD  Personal factors affecting pt at time of IE include:steps to enter environment and difficulty performing ADLS  Prior to admission, pt was I with ADLs  Upon evaluation: the following deficits impact occupational performance: decreased balance, decreased tolerance, increased pain and orthopedic restrictions  Pt to benefit from continued skilled OT tx while in the hospital to address deficits as defined above and maximize level of functional independence w ADL's and functional mobility  Occupational Performance areas to address include: bathing/shower, toilet hygiene, dressing, functional mobility and clothing management  From OT standpoint, recommendation at time of d/c would be Home OT  Goals   Patient Goals To not feel nauseas   Plan   Treatment Interventions ADL retraining;Functional transfer training;Equipment evaluation/education; Neuromuscular reeducation; Energy conservation; Endurance training   Goal Expiration Date 01/30/21   OT Treatment Day 0   OT Frequency 3-5x/wk   Recommendation   OT Discharge Recommendation Home with skilled therapy   OT - OK to Discharge Yes  (when medically stable)   Additional Comments  [facility] raw score of  15, standardized score of  34 69  As per the data, this score is correlated most closely with a  STR recommendation  However,the therapist's discharge disposition recommendation may vary as numerous social factors and objective findings contribute to the suggested destination     AM-PAC Daily Activity Inpatient   Lower Body Dressing 1   Bathing 2   Toileting 1   Upper Body Dressing 3   Grooming 4   Eating 4   Daily Activity Raw Score 15   Daily Activity Standardized Score (Calc for Raw Score >=11) 34 69     GOALS:    Pt will achieve the following within specified time frame: STG  Pt will achieve the following goals within 5 days    *ADL transfers with Min (A) for inc'd independence with ADLs/purposeful tasks    *UB ADL with (I) for inc'd independence with self cares    *LB ADL with Max (A) using AE prn for inc'd independence with self cares    *Toileting with Mod (A) for clothing management and hygiene for return to PLOF with personal care    *Increase static stand balance to F and dyn stand balance to F- for inc'd safety with standing purposeful tasks    *Increase stand tolerance x1 m for inc'd tolerance with standing purposeful tasks    *Participate in 10m UE therex to increase overall stamina/activity tolerance for purposeful tasks    *Bed mobility- (S) for inc'd independence to manage own comfort and initiate EOB & OOB purposeful tasks    Pt will achieve the following within specified time frame: LTG  Pt will achieve the following goals within 10 days    *ADL transfers with (S) for inc'd independence with ADLs/purposeful tasks    *LB ADL with Mod (A) using AE prn for inc'd independence with self cares    *Toileting with Min (A) for clothing management and hygiene for return to PLOF with personal care    *Increase static stand balance to F+ and dyn stand balance to F for inc'd safety with standing purposeful tasks    *Increase stand tolerance x3 m for inc'd tolerance with standing purposeful tasks    *Bed mobility- (I) for inc'd independence to manage own comfort and initiate EOB & OOB purposeful tasks      Nisreen Porter MS, OTR/L

## 2021-01-20 NOTE — ANESTHESIA POSTPROCEDURE EVALUATION
Post-Op Assessment Note    CV Status:  Stable  Pain Score: 0    Pain management: adequate     Mental Status:  Alert   Hydration Status:  Stable   PONV Controlled:  None   Airway Patency:  Patent      Post Op Vitals Reviewed: Yes      Staff: CRNA         No complications documented      BP   134/64   Temp   98 6   Pulse  79   Resp   16   SpO2   100%

## 2021-01-20 NOTE — QUICK NOTE
This patient underwent a procedure not on the inpatient only list and therefore is subject to the 2 midnight benchmark  Accordingly, in my judgement, the patient will require at least 2 midnights in the hospital receiving acute medical care  The patient is noted to have comorbid conditions including IV narcotics for pain control and degenerative joint disease which will require management in the mitesh-operative period prior to safe discharge  As such the patient will require acute care beyond the usual and routine recovery period for the procedure alone and is therefore appropriate for inpatient admission

## 2021-01-20 NOTE — PLAN OF CARE
Problem: OCCUPATIONAL THERAPY ADULT  Goal: Performs self-care activities at highest level of function for planned discharge setting  See evaluation for individualized goals  Description: Treatment Interventions: ADL retraining, Functional transfer training, Equipment evaluation/education, Neuromuscular reeducation, Energy conservation, Endurance training    See flowsheet documentation for full assessment, interventions and recommendations  Note: Limitation: Decreased ADL status, Decreased Safe judgement during ADL, Decreased endurance, Decreased self-care trans, Decreased high-level ADLs  Prognosis: Good  Assessment: Pt is a 78 y o  female seen for OT evaluation s/p admit to [address] [place] [address] on 1/20/2021 w/ Degenerative joint disease of right knee  Comorbidities affecting pt's functional performance at time of assessment include: OA, DJD, HTN, Hyperlipidemia, GERD  Personal factors affecting pt at time of IE include:steps to enter environment and difficulty performing ADLS  Prior to admission, pt was I with ADLs  Upon evaluation: the following deficits impact occupational performance: decreased balance, decreased tolerance, increased pain and orthopedic restrictions  Pt to benefit from continued skilled OT tx while in the hospital to address deficits as defined above and maximize level of functional independence w ADL's and functional mobility  Occupational Performance areas to address include: bathing/shower, toilet hygiene, dressing, functional mobility and clothing management  From OT standpoint, recommendation at time of d/c would be Home OT       OT Discharge Recommendation: Home with skilled therapy  OT - OK to Discharge: Yes(when medically stable)    Nisreen Corado MS, OTR/L

## 2021-01-21 LAB
ANION GAP SERPL CALCULATED.3IONS-SCNC: 8 MMOL/L (ref 4–13)
BUN SERPL-MCNC: 10 MG/DL (ref 7–25)
CALCIUM SERPL-MCNC: 8.4 MG/DL (ref 8.6–10.5)
CHLORIDE SERPL-SCNC: 99 MMOL/L (ref 98–107)
CO2 SERPL-SCNC: 25 MMOL/L (ref 21–31)
CREAT SERPL-MCNC: 0.58 MG/DL (ref 0.6–1.2)
GFR SERPL CREATININE-BSD FRML MDRD: 95 ML/MIN/1.73SQ M
GLUCOSE SERPL-MCNC: 124 MG/DL (ref 65–99)
HCT VFR BLD AUTO: 34.2 % (ref 42–47)
HGB BLD-MCNC: 11.5 G/DL (ref 12–16)
POTASSIUM SERPL-SCNC: 3.8 MMOL/L (ref 3.5–5.5)
SODIUM SERPL-SCNC: 132 MMOL/L (ref 134–143)

## 2021-01-21 PROCEDURE — 97535 SELF CARE MNGMENT TRAINING: CPT

## 2021-01-21 PROCEDURE — 80048 BASIC METABOLIC PNL TOTAL CA: CPT | Performed by: PHYSICIAN ASSISTANT

## 2021-01-21 PROCEDURE — 99024 POSTOP FOLLOW-UP VISIT: CPT | Performed by: PHYSICIAN ASSISTANT

## 2021-01-21 PROCEDURE — 97110 THERAPEUTIC EXERCISES: CPT

## 2021-01-21 PROCEDURE — 97116 GAIT TRAINING THERAPY: CPT

## 2021-01-21 PROCEDURE — 97530 THERAPEUTIC ACTIVITIES: CPT

## 2021-01-21 PROCEDURE — 85014 HEMATOCRIT: CPT | Performed by: PHYSICIAN ASSISTANT

## 2021-01-21 PROCEDURE — 85018 HEMOGLOBIN: CPT | Performed by: PHYSICIAN ASSISTANT

## 2021-01-21 RX ADMIN — FONDAPARINUX SODIUM 2.5 MG: 2.5 INJECTION, SOLUTION SUBCUTANEOUS at 08:26

## 2021-01-21 RX ADMIN — OXYCODONE HYDROCHLORIDE AND ACETAMINOPHEN 1 TABLET: 5; 325 TABLET ORAL at 20:29

## 2021-01-21 RX ADMIN — DOCUSATE SODIUM 100 MG: 100 CAPSULE, LIQUID FILLED ORAL at 18:03

## 2021-01-21 RX ADMIN — OXYCODONE HYDROCHLORIDE AND ACETAMINOPHEN 1 TABLET: 5; 325 TABLET ORAL at 02:47

## 2021-01-21 RX ADMIN — OXYCODONE HYDROCHLORIDE AND ACETAMINOPHEN 1 TABLET: 5; 325 TABLET ORAL at 06:43

## 2021-01-21 RX ADMIN — KETOROLAC TROMETHAMINE 15 MG: 30 INJECTION, SOLUTION INTRAMUSCULAR; INTRAVENOUS at 21:58

## 2021-01-21 RX ADMIN — Medication 1 TABLET: at 08:27

## 2021-01-21 RX ADMIN — KETOROLAC TROMETHAMINE 15 MG: 30 INJECTION, SOLUTION INTRAMUSCULAR; INTRAVENOUS at 15:11

## 2021-01-21 RX ADMIN — KETOROLAC TROMETHAMINE 15 MG: 30 INJECTION, SOLUTION INTRAMUSCULAR; INTRAVENOUS at 08:25

## 2021-01-21 RX ADMIN — FERROUS SULFATE TAB 325 MG (65 MG ELEMENTAL FE) 325 MG: 325 (65 FE) TAB at 08:27

## 2021-01-21 RX ADMIN — PANTOPRAZOLE SODIUM 40 MG: 40 TABLET, DELAYED RELEASE ORAL at 05:30

## 2021-01-21 RX ADMIN — Medication 2000 UNITS: at 08:26

## 2021-01-21 RX ADMIN — FOLIC ACID 1 MG: 1 TABLET ORAL at 08:27

## 2021-01-21 RX ADMIN — GENTAMICIN SULFATE 80 MG: 80 INJECTION, SOLUTION INTRAVENOUS at 05:30

## 2021-01-21 RX ADMIN — FERROUS SULFATE TAB 325 MG (65 MG ELEMENTAL FE) 325 MG: 325 (65 FE) TAB at 18:05

## 2021-01-21 RX ADMIN — MORPHINE SULFATE 3 MG: 4 INJECTION INTRAVENOUS at 11:41

## 2021-01-21 RX ADMIN — MAGNESIUM GLUCONATE 500 MG ORAL TABLET 400 MG: 500 TABLET ORAL at 08:27

## 2021-01-21 RX ADMIN — OXYCODONE HYDROCHLORIDE AND ACETAMINOPHEN 500 MG: 500 TABLET ORAL at 08:27

## 2021-01-21 RX ADMIN — METOPROLOL SUCCINATE 25 MG: 25 TABLET, EXTENDED RELEASE ORAL at 08:27

## 2021-01-21 RX ADMIN — DOCUSATE SODIUM 100 MG: 100 CAPSULE, LIQUID FILLED ORAL at 08:27

## 2021-01-21 RX ADMIN — VANCOMYCIN HYDROCHLORIDE 500 MG: 500 INJECTION, SOLUTION INTRAVENOUS at 02:47

## 2021-01-21 NOTE — PLAN OF CARE
Problem: PHYSICAL THERAPY ADULT  Goal: Performs mobility at highest level of function for planned discharge setting  See evaluation for individualized goals  Description: Treatment/Interventions: Functional transfer training, LE strengthening/ROM, Therapeutic exercise, Endurance training, Patient/family training, Equipment eval/education, Bed mobility, Gait training, Continued evaluation, Spoke to nursing, Spoke to case management, OT  Equipment Recommended: (TBD)       See flowsheet documentation for full assessment, interventions and recommendations  1/21/2021 1453 by Linda Perkins PTA  Outcome: Progressing  Note: Prognosis: Good  Problem List: Decreased strength, Decreased range of motion, Decreased endurance, Impaired balance, Decreased mobility, Orthopedic restrictions, Pain  Assessment: Pt seen for PT treatment session this date with interventions consisting of gait training w/ emphasis on improving pt's ability to ambulate level surfaces x 12ft x2 with CGA provided by therapist with RW, Therapeutic exercise consisting of: AROM, AAROM and PROM 20 reps B LE and R LE in supine position and therapeutic activity consisting of training: bed mobility, supine<>sit transfers, sit<>stand transfers, stand pivot transfers towards both direction and toilet transfers with Felecia from low toilet  Pt agreeable to PT treatment session upon arrival, pt found supine in bed w/ HOB elevated, in no apparent distress, A&O x 4 and responsive  In comparison to previous session, pt with improvements in ability to ambulate  Post session: pt returned BTB, all needs in reach and RN notified of session findings/recommendations Continue to recommend Home PT with family support at time of d/c in order to maximize pt's functional independence and safety w/ mobility   Pt continues to be functioning below baseline level, and remains limited 2* factors listed above and including impaired balance, decreased ROM , decreased functional mobility and decreased strength  PT will continue to see pt while here in order to address the deficits listed above and provide interventions consistent w/ POC in effort to achieve STGs  Barriers to Discharge: Inaccessible home environment  Barriers to Discharge Comments: Inability to advance functional mobility 2/2 medical complications  PT Discharge Recommendation: Home with skilled therapy, Return to previous environment with social support     PT - OK to Discharge: No    See flowsheet documentation for full assessment  1/21/2021 1245 by Priscilla Moraes PTA  Outcome: Progressing  Note: Prognosis: Good  Problem List: Decreased strength, Decreased range of motion, Decreased endurance, Impaired balance, Decreased mobility, Orthopedic restrictions, Pain  Assessment: Pt seen for PT treatment session this date with interventions consisting of gait training w/ emphasis on improving pt's ability to ambulate level surfaces x 2 ft bed to chair with min A provided by therapist with RW, Therapeutic exercise consisting of: AROM, AAROM and PROM 20 reps B LE in sitting and supine position and therapeutic activity consisting of training: bed mobility, supine<>sit transfers, sit<>stand transfers, static sitting tolerance at EOB for 18 minutes w/ min UE support, vc and tactile cues for static sitting posture faciliation and vc and tactile cues for static standing posture faciliation  Pt agreeable to PT treatment session upon arrival, pt found , in no apparent distress, A&O x 4 and responsive  In comparison to previous session, pt with improvements in functional mobility  Post session: pt returned back to recliner, all needs in reach and RN notified of session findings/recommendations Continue to recommend Home PT at time of d/c in order to maximize pt's functional independence and safety w/ mobility   Pt continues to be functioning below baseline level, and remains limited 2* factors listed above and including impaired balance, decreased functional mobility, pain and decreased ROM  PT will continue to see pt while here in order to address the deficits listed above and provide interventions consistent w/ POC in effort to achieve STGs  Barriers to Discharge: Inaccessible home environment  Barriers to Discharge Comments: Inability to advance functional mobility 2/2 medical complications  PT Discharge Recommendation: Home with skilled therapy, Return to previous environment with social support     PT - OK to Discharge: No    See flowsheet documentation for full assessment

## 2021-01-21 NOTE — PLAN OF CARE
Problem: PHYSICAL THERAPY ADULT  Goal: Performs mobility at highest level of function for planned discharge setting  See evaluation for individualized goals  Description: Treatment/Interventions: Functional transfer training, LE strengthening/ROM, Therapeutic exercise, Endurance training, Patient/family training, Equipment eval/education, Bed mobility, Gait training, Continued evaluation, Spoke to nursing, Spoke to case management, OT  Equipment Recommended: (TBD)       See flowsheet documentation for full assessment, interventions and recommendations  Outcome: Progressing  Note: Prognosis: Good  Problem List: Decreased strength, Decreased range of motion, Decreased endurance, Impaired balance, Decreased mobility, Orthopedic restrictions, Pain  Assessment: Pt seen for PT treatment session this date with interventions consisting of gait training w/ emphasis on improving pt's ability to ambulate level surfaces x 2 ft bed to chair with min A provided by therapist with RW, Therapeutic exercise consisting of: AROM, AAROM and PROM 20 reps B LE in sitting and supine position and therapeutic activity consisting of training: bed mobility, supine<>sit transfers, sit<>stand transfers, static sitting tolerance at EOB for 18 minutes w/ min UE support, vc and tactile cues for static sitting posture faciliation and vc and tactile cues for static standing posture faciliation  Pt agreeable to PT treatment session upon arrival, pt found , in no apparent distress, A&O x 4 and responsive  In comparison to previous session, pt with improvements in functional mobility  Post session: pt returned back to recliner, all needs in reach and RN notified of session findings/recommendations Continue to recommend Home PT at time of d/c in order to maximize pt's functional independence and safety w/ mobility   Pt continues to be functioning below baseline level, and remains limited 2* factors listed above and including impaired balance, decreased functional mobility, pain and decreased ROM  PT will continue to see pt while here in order to address the deficits listed above and provide interventions consistent w/ POC in effort to achieve STGs  Barriers to Discharge: Inaccessible home environment  Barriers to Discharge Comments: Inability to advance functional mobility 2/2 medical complications  PT Discharge Recommendation: Home with skilled therapy, Return to previous environment with social support     PT - OK to Discharge: No    See flowsheet documentation for full assessment

## 2021-01-21 NOTE — PHYSICAL THERAPY NOTE
01/21/21 1350   PT Last Visit   PT Visit Date 01/21/21   Note Type   Note Type Treatment   Pain Assessment   Pain Assessment Tool 0-10   Pain Score 7   Pain Location/Orientation Orientation: Right;Location: Knee   Pain Onset/Description Frequency: Constant/Continuous; Descriptor: Tashaisaiah Gore   Effect of Pain on Daily Activities yes   Patient's Stated Pain Goal No pain   Hospital Pain Intervention(s) Medication (See MAR)   Restrictions/Precautions   Weight Bearing Precautions Per Order Yes   RLE Weight Bearing Per Order FWB   Other Precautions WBS; Multiple lines; Fall Risk;O2   General   Chart Reviewed Yes   Response to Previous Treatment Patient with no complaints from previous session  Family/Caregiver Present No   Cognition   Overall Cognitive Status WFL   Arousal/Participation Alert; Cooperative   Attention Within functional limits   Orientation Level Oriented X4   Memory Within functional limits   Following Commands Follows all commands and directions without difficulty   Comments pt agreeable to PT session   Bed Mobility   Supine to Sit 5  Supervision   Additional items Assist x 1;HOB elevated; Bedrails; Increased time required;Verbal cues   Sit to Supine 5  Supervision   Additional items Assist x 1;Bedrails; Increased time required;Verbal cues;LE management   Transfers   Sit to Stand   (CGA)   Additional items Assist x 1;HOB elevated; Bedrails; Increased time required;Verbal cues   Stand to Sit   (CGA)   Additional items Assist x 1; Armrests; Increased time required;Verbal cues   Stand pivot   (CGA)   Toilet transfer 4  Minimal assistance   Additional items Assist x 1; Increased time required;Verbal cues;Standard toilet   Ambulation/Elevation   Gait pattern Improper Weight shift;Decreased foot clearance;Decreased R stance; Short stride; Excessively slow   Gait Assistance   (CGA)   Additional items Assist x 1;Verbal cues   Assistive Device Rolling walker   Distance 12 ftx2   Stair Management Assistance Not tested Balance   Static Sitting Normal   Dynamic Sitting Good   Static Standing Fair +   Dynamic Standing Fair   Ambulatory Fair   Endurance Deficit   Endurance Deficit Yes   Activity Tolerance   Activity Tolerance Patient limited by pain   Nurse Made Aware RN made aware of outcomes   Exercises   Quad Sets Supine;20 reps;AROM; Right   Heelslides Supine;20 reps;AAROM; Right   Glute Sets Supine;20 reps   Hip Flexion Supine;20 reps;AROM; Right   Hip Abduction Supine;20 reps;AROM; Right   Hip Adduction Supine;20 reps;AROM; Bilateral   Knee AROM Short Arc Quad Supine;20 reps;AROM; Bilateral   Knee PROM Flexion Supine;10 reps;PROM; Right   Ankle Pumps Supine;20 reps;AROM; Bilateral   Assessment   Prognosis Good   Problem List Decreased strength;Decreased range of motion;Decreased endurance; Impaired balance;Decreased mobility;Orthopedic restrictions;Pain   Assessment Pt seen for PT treatment session this date with interventions consisting of gait training w/ emphasis on improving pt's ability to ambulate level surfaces x 12ft x2 with CGA provided by therapist with RW, Therapeutic exercise consisting of: AROM, AAROM and PROM 20 reps B LE and R LE in supine position and therapeutic activity consisting of training: bed mobility, supine<>sit transfers, sit<>stand transfers, stand pivot transfers towards both direction and toilet transfers with Felecia from low toilet  Pt agreeable to PT treatment session upon arrival, pt found supine in bed w/ HOB elevated, in no apparent distress, A&O x 4 and responsive  In comparison to previous session, pt with improvements in ability to ambulate  Post session: pt returned BTB, all needs in reach and RN notified of session findings/recommendations Continue to recommend Home PT with family support at time of d/c in order to maximize pt's functional independence and safety w/ mobility   Pt continues to be functioning below baseline level, and remains limited 2* factors listed above and including impaired balance, decreased ROM , decreased functional mobility and decreased strength  PT will continue to see pt while here in order to address the deficits listed above and provide interventions consistent w/ POC in effort to achieve STGs  Barriers to Discharge Inaccessible home environment   Goals   Patient Goals to go home   PT Treatment Day 2   Plan   Treatment/Interventions Functional transfer training;LE strengthening/ROM; Therapeutic exercise; Endurance training;Bed mobility;Gait training   Progress Progressing toward goals   PT Frequency Twice a day   Recommendation   PT Discharge Recommendation Home with skilled therapy; Return to previous environment with social support   Equipment Recommended Walker   PT - OK to Discharge No   Additional Comments upon conclusion, pt returned to supine in bed with all needs in reach   [facility] 8 in Bed Without Bedrails 4   Lying on Back to Sitting on Edge of Flat Bed 3   Moving Bed to Chair 3   Standing Up From Chair 3   Walk in Room 3   Climb 3-5 Stairs 3   Basic Mobility Inpatient Raw Score 19   Basic Mobility Standardized Score 42 48

## 2021-01-21 NOTE — PHYSICAL THERAPY NOTE
01/21/21 0935   PT Last Visit   PT Visit Date 01/21/21   Note Type   Note Type Treatment   Pain Assessment   Pain Assessment Tool 0-10   Pain Score 8   Pain Location/Orientation Orientation: Right;Location: Knee   Pain Onset/Description Onset: Ongoing;Frequency: Constant/Continuous   Patient's Stated Pain Goal No pain   Restrictions/Precautions   Weight Bearing Precautions Per Order Yes   RLE Weight Bearing Per Order FWB   Other Precautions WBS; Fall Risk;Pain;Multiple lines   General   Chart Reviewed Yes   Response to Previous Treatment Patient with no complaints from previous session  Family/Caregiver Present No   Cognition   Overall Cognitive Status WFL   Arousal/Participation Alert; Cooperative   Attention Within functional limits   Orientation Level Oriented X4   Memory Within functional limits   Following Commands Follows all commands and directions without difficulty   Comments pt agreeable to PT session   Bed Mobility   Supine to Sit 5  Supervision   Additional items Assist x 1;HOB elevated; Bedrails; Increased time required;Verbal cues   Additional Comments pt OOB in chair to conclude session   Transfers   Sit to Stand 4  Minimal assistance   Additional items Assist x 1;HOB elevated; Bedrails; Increased time required;Verbal cues   Stand to Sit 4  Minimal assistance   Additional items Assist x 1; Armrests; Increased time required;Verbal cues   Stand pivot 4  Minimal assistance   Additional items Assist x 1; Increased time required;Verbal cues   Ambulation/Elevation   Gait pattern Improper Weight shift;Decreased foot clearance;Decreased R stance; Step to;Excessively slow; Short stride   Gait Assistance 4  Minimal assist   Additional items Assist x 1;Verbal cues; Tactile cues   Assistive Device Rolling walker   Distance 2ft bed to chair   Stair Management Assistance Not tested   Balance   Static Sitting Good   Dynamic Sitting Fair +   Static Standing Fair   Dynamic Standing [facility] Deficit   Endurance Deficit Yes   Activity Tolerance   Activity Tolerance Patient limited by fatigue;Patient limited by pain   Nurse Made Aware RN made aware of outcomes   Exercises   Quad Sets Supine;20 reps;AROM; Right   Heelslides Supine;Sitting;20 reps;AAROM; Right   Glute Sets Supine;20 reps   Hip Abduction Supine;Sitting;20 reps;AROM; Right   Hip Adduction Supine;20 reps;AROM; Bilateral   Knee PROM Flexion Sitting;10 reps;PROM; Right   Ankle Pumps Sitting;Supine;20 reps;AROM; Bilateral   Marching Sitting;20 reps;AROM; Right   Equipment Use   CPM adjusted Right ROM 0-80 degrees   Assessment   Prognosis Good   Problem List Decreased strength;Decreased range of motion;Decreased endurance; Impaired balance;Decreased mobility;Orthopedic restrictions;Pain   Assessment Pt seen for PT treatment session this date with interventions consisting of gait training w/ emphasis on improving pt's ability to ambulate level surfaces x 2 ft bed to chair with min A provided by therapist with RW, Therapeutic exercise consisting of: AROM, AAROM and PROM 20 reps B LE in sitting and supine position and therapeutic activity consisting of training: bed mobility, supine<>sit transfers, sit<>stand transfers, static sitting tolerance at EOB for 18 minutes w/ min UE support, vc and tactile cues for static sitting posture faciliation and vc and tactile cues for static standing posture faciliation  Pt agreeable to PT treatment session upon arrival, pt found , in no apparent distress, A&O x 4 and responsive  In comparison to previous session, pt with improvements in functional mobility  Post session: pt returned back to recliner, all needs in reach and RN notified of session findings/recommendations Continue to recommend Home PT at time of d/c in order to maximize pt's functional independence and safety w/ mobility   Pt continues to be functioning below baseline level, and remains limited 2* factors listed above and including impaired balance, decreased functional mobility, pain and decreased ROM  PT will continue to see pt while here in order to address the deficits listed above and provide interventions consistent w/ POC in effort to achieve STGs  Barriers to Discharge Inaccessible home environment   Goals   Patient Goals to go home   PT Treatment Day 1   Plan   Treatment/Interventions Functional transfer training;LE strengthening/ROM; Therapeutic exercise; Endurance training;Bed mobility;Gait training   Progress Progressing toward goals   PT Frequency Twice a day   Recommendation   PT Discharge Recommendation Home with skilled therapy; Return to previous environment with social support   Equipment Recommended Walker   PT - OK to Discharge No   Additional Comments upon conclusion, pt returned to sitting OOB in chair with all needs in reach   [facility] 8 in Bed Without Bedrails 4   Lying on Back to Sitting on Edge of Flat Bed 3   Moving Bed to Chair 3   Standing Up From Chair 3   Walk in Room 3   Climb 3-5 Stairs 3   Basic Mobility Inpatient Raw Score 19   Basic Mobility Standardized Score 42 48

## 2021-01-21 NOTE — CASE MANAGEMENT
Evaluated the pt at the bedside  Pt was admitted to the hospital for [address]  Explained the role of CM and the options of discharge planning with the pt  Pt lives with her spouse in a 2 story home  Pt has 1 SANDY and 12 steps to the bedroom  Pt has a bathroom on each floor with a raised toilet seat  Pt indicated she had been independent and drives  Pt PCP is  Dr Phyllis Thomas  Pt gets her medications at [facility]  TC to [facility] to determine copay of med and availability  Spoke to pharmacy  The Co-pay for [employer] is $486  Notified Jaechristian Monday PA of same  Pt states her spouse will transport her home upon discharge  Pt refused the CPM   Pt will need a walker upon discharge  A post acute care recommendation was made by your care team for [facility]  Discussed Freedom of Choice with patient  List of agencies given to patient via in person  patient aware the list is custom filtered for them by preference  and that [facility] post acute providers are designated  Pt chose [facility] from the list for services  Sent referral for same  Patient/caregiver received discharge checklist   Content reviewed  Patient/caregiver encouraged to participate in discharge plan of care prior to discharge home  CM reviewed d/c planning process including the following: identifying help at home, patient preference for d/c planning needs, availability of treatment team to discuss questions or concerns patient and/or family may have regarding understanding medications and recognizing signs and symptoms once discharged  CM also encouraged patient to follow up with all recommended appointments after discharge  Patient advised of importance for patient and family to participate in managing patients medical well being

## 2021-01-21 NOTE — OCCUPATIONAL THERAPY NOTE
01/21/21 0939   OT Last Visit   OT Visit Date 01/21/21   Note Type   Note Type Treatment   Restrictions/Precautions   Weight Bearing Precautions Per Order Yes   RLE Weight Bearing Per Order FWB   Other Precautions WBS; Multiple lines; Fall Risk   Lifestyle   Reciprocal Relationships  can assist at home    Intrinsic Gratification enjoys growing flowers outdoors, and plants indoors   Pain Assessment   Pain Assessment Tool 0-10   Pain Score 8  (post transfer to chair)   ADL   Where Assessed Chair   UB Bathing Assistance 5  Supervision/Setup   UB Bathing Deficit Setup;Supervision/safety; Increased time to complete   LB Bathing Assistance 4  Minimal Assistance   LB Bathing Deficit Setup;Supervision/safety; Increased time to complete;Right lower leg including foot   LB Dressing Comments instruction and demos  given in techniques, including intro  to [facility] (if needed, desired)    Bed Mobility   Additional Comments please see PT note    Transfers   Sit to Stand 4  Minimal assistance   Additional items Assist x 1;HOB elevated; Bedrails; Increased time required;Verbal cues   Stand to Sit 4  Minimal assistance   Additional items Assist x 1; Armrests; Increased time required;Verbal cues   Stand pivot 4  Minimal assistance   Additional items Assist x 1; Increased time required;Verbal cues   Coordination   Gross Motor WFL   Dexterity WFL   Cognition   Overall Cognitive Status WFL   Arousal/Participation Alert; Cooperative   Attention Within functional limits   Following Commands Follows all commands and directions without difficulty   Activity Tolerance   Activity Tolerance Patient tolerated treatment well  (some lightheadness reported )   Assessment   Assessment Patient participated in Skilled OT session this date with interventions consisting of ADL re training with the use of correct body mechnaics, Energy Conservation techniques, safety awareness and fall prevention techniques,  long handle equipment and  therapeutic activities to: increase activity tolerance   Patient agreeable to OT treatment session, upon arrival patient was found seated in bed (supported) - then transferred to chair for self-care participation  In comparison to previous session, patient with improvements in functional mobility  Patient requiring verbal cues for safety, verbal cues for correct technique and frequent rest periods  Patient continues to be functioning below baseline level, occupational performance remains limited secondary to factors listed above and increased risk for falls and injury  From OT standpoint, recommendation at time of d/c would be Home with family support and Home OT  Patient to benefit from continued Occupational Therapy treatment while in the hospital to address deficits as defined above and maximize level of functional independence with ADLs and functional mobility  Plan   Treatment Interventions ADL retraining;Functional transfer training;Patient/family training;Equipment evaluation/education; Energy conservation; Activityengagement   Goal Expiration Date 01/30/21   OT Treatment Day 1        01/21/21 0939   OT Last Visit   OT Visit Date 01/21/21   Note Type   Note Type Treatment   Restrictions/Precautions   Weight Bearing Precautions Per Order Yes   RLE Weight Bearing Per Order FWB   Other Precautions WBS; Multiple lines; Fall Risk   Lifestyle   Reciprocal Relationships  can assist at home    Intrinsic Gratification enjoys growing flowers outdoors, and plants indoors   Pain Assessment   Pain Assessment Tool 0-10   Pain Score 8  (post transfer to chair)   ADL   Where Assessed Chair   UB Bathing Assistance 5  Supervision/Setup   UB Bathing Deficit Setup;Supervision/safety; Increased time to complete   LB Bathing Assistance 4  Minimal Assistance   LB Bathing Deficit Setup;Supervision/safety; Increased time to complete;Right lower leg including foot   LB Dressing Comments instruction and demos   given in techniques, including intro  to [facility] [place] needed, desired)    Bed Mobility   Additional Comments please see PT note    Transfers   Sit to Stand 4  Minimal assistance   Additional items Assist x 1;HOB elevated; Bedrails; Increased time required;Verbal cues   Stand to Sit 4  Minimal assistance   Additional items Assist x 1; Armrests; Increased time required;Verbal cues   Stand pivot 4  Minimal assistance   Additional items Assist x 1; Increased time required;Verbal cues   Coordination   Gross Motor WFL   Dexterity WFL   Cognition   Overall Cognitive Status WFL   Arousal/Participation Alert; Cooperative   Attention Within functional limits   Following Commands Follows all commands and directions without difficulty   Activity Tolerance   Activity Tolerance Patient tolerated treatment well  (some lightheadness reported )   Assessment   Assessment Patient participated in Skilled OT session this date with interventions consisting of ADL re training with the use of correct body mechnaics, Energy Conservation techniques, safety awareness and fall prevention techniques,  long handle equipment and  therapeutic activities to: increase activity tolerance   Patient agreeable to OT treatment session, upon arrival patient was found seated in bed (supported) - then transferred to chair for self-care participation  In comparison to previous session, patient with improvements in functional mobility  Patient requiring verbal cues for safety, verbal cues for correct technique and frequent rest periods  Patient continues to be functioning below baseline level, occupational performance remains limited secondary to factors listed above and increased risk for falls and injury  From OT standpoint, recommendation at time of d/c would be Home with family support and Home OT     Patient to benefit from continued Occupational Therapy treatment while in the hospital to address deficits as defined above and maximize level of functional independence with ADLs and functional mobility  Plan   Treatment Interventions ADL retraining;Functional transfer training;Patient/family training;Equipment evaluation/education; Energy conservation; Activityengagement   Goal Expiration Date 01/30/21   OT Treatment Day 1   LATESHA Gary    Addendum:  Patient stood with walker support to do perianal washing with good results

## 2021-01-21 NOTE — PLAN OF CARE
Problem: OCCUPATIONAL THERAPY ADULT  Goal: Performs self-care activities at highest level of function for planned discharge setting  See evaluation for individualized goals  Description: Treatment Interventions: ADL retraining, Functional transfer training, Equipment evaluation/education, Neuromuscular reeducation, Energy conservation, Endurance training    See flowsheet documentation for full assessment, interventions and recommendations  Outcome: Progressing  Note: Limitation: Decreased ADL status, Decreased Safe judgement during ADL, Decreased endurance, Decreased self-care trans, Decreased high-level ADLs  Prognosis: Good  Assessment: Patient participated in Skilled OT session this date with interventions consisting of ADL re training with the use of correct body mechnaics, Energy Conservation techniques, safety awareness and fall prevention techniques,  long handle equipment and  therapeutic activities to: increase activity tolerance   Patient agreeable to OT treatment session, upon arrival patient was found seated in bed (supported) - then transferred to chair for self-care participation  In comparison to previous session, patient with improvements in functional mobility  Patient requiring verbal cues for safety, verbal cues for correct technique and frequent rest periods  Patient continues to be functioning below baseline level, occupational performance remains limited secondary to factors listed above and increased risk for falls and injury  From OT standpoint, recommendation at time of d/c would be Home with family support and Home OT  Patient to benefit from continued Occupational Therapy treatment while in the hospital to address deficits as defined above and maximize level of functional independence with ADLs and functional mobility        OT Discharge Recommendation: Home with skilled therapy  OT - OK to Discharge: Yes(when medically stable)     LATESHA Ray

## 2021-01-21 NOTE — PROGRESS NOTES
Progress Note - Orthopedics   Sylvie Hannah 78 y o  female MRN: [id number]  Unit/Bed#: -01      Subjective:    78 y  o female seen and examined this morning  Postoperative day 1 status post right total knee arthroplasty  No acute events, no complaints  Pt doing well  Pain controlled   Denies fevers chills, CP, SOB    Labs:  0   Lab Value Date/Time    HCT 34 2 (L) 01/21/2021 0538    HCT 41 2 (L) 01/04/2021 1218    HGB 11 5 (L) 01/21/2021 0538    HGB 13 8 01/04/2021 1218    INR 1 00 01/04/2021 1218    WBC 5 30 01/04/2021 1218    CRP <5 0 01/04/2021 1218       Meds:    Current Facility-Administered Medications:     acetaminophen (TYLENOL) tablet 650 mg, 650 mg, Oral, Q4H PRN, Mónica Stairs, PA-C    aluminum-magnesium hydroxide-simethicone (MYLANTA) oral suspension 30 mL, 30 mL, Oral, Q6H PRN, Mónica Stairs, PA-C    ascorbic acid (VITAMIN C) tablet 500 mg, 500 mg, Oral, BID, Mónica Stairs, PA-C, 500 mg at 01/21/21 0827    calcium carbonate-vitamin D (OSCAL-D) 500 mg-200 units per tablet 1 tablet, 1 tablet, Oral, Daily With Breakfast, Mónica Stairs, PA-C, 1 tablet at 01/21/21 0827    cholecalciferol (VITAMIN D3) tablet 2,000 Units, 2,000 Units, Oral, Daily, Mónica Stairs, PA-C, 2,000 Units at 01/21/21 8364    docusate sodium (COLACE) capsule 100 mg, 100 mg, Oral, BID, Mónica Stairs, PA-C, 100 mg at 01/21/21 1687    ferrous sulfate tablet 325 mg, 325 mg, Oral, BID With Meals, Mónica Stairs, PA-C, 325 mg at 03/13/15 0601    folic acid (FOLVITE) tablet 1 mg, 1 mg, Oral, Daily, Mónica Stairs, PA-C, 1 mg at 01/21/21 0827    fondaparinux (ARIXTRA) subcutaneous injection 2 5 mg, 2 5 mg, Subcutaneous, Daily, Poonam Bailey PA-C, 2 5 mg at 01/21/21 2382    ketorolac (TORADOL) injection 15 mg, 15 mg, Intravenous, Q6H PRN, Willi Romero PA-C, 15 mg at 01/21/21 0825    lactated ringers bolus 1,000 mL, 1,000 mL, Intravenous, Once PRN **AND** lactated ringers bolus 1,000 mL, 1,000 mL, Intravenous, Once PRN, Ulysses Gallo, PA-C    magnesium oxide (MAG-OX) tablet 400 mg, 400 mg, Oral, Daily, Ulysses Gallo, PA-C, 400 mg at 01/21/21 0827    metoprolol succinate (TOPROL-XL) 24 hr tablet 25 mg, 25 mg, Oral, Daily, Rachelle Essence Odierno, PA-C, 25 mg at 01/21/21 0827    morphine (PF) 4 mg/mL injection 3 mg, 3 mg, Intravenous, Q4H PRN, Ulysses Gallo, PA-C    multivitamin-minerals (CENTRUM) tablet 1 tablet, 1 tablet, Oral, Daily, Ulysses Gallo, PA-C, 1 tablet at 01/21/21 0827    ondansetron (ZOFRAN) injection 4 mg, 4 mg, Intravenous, Q6H PRN, Ulysses Gallo, PA-C, 4 mg at 01/20/21 1943    oxyCODONE-acetaminophen (PERCOCET) 5-325 mg per tablet 1 tablet, 1 tablet, Oral, Q4H PRN, Ulysses Gallo, PA-C, 1 tablet at 01/21/21 0643    pantoprazole (PROTONIX) EC tablet 40 mg, 40 mg, Oral, Early Morning, Rachelle Essence Odierno, PA-C, 40 mg at 01/21/21 0530    sodium chloride 0 9 % bolus 1,000 mL, 1,000 mL, Intravenous, Once PRN **AND** sodium chloride 0 9 % bolus 1,000 mL, 1,000 mL, Intravenous, Once PRN, Ulysses Gallo, PA-C    Blood Culture:   No results found for: BLOODCX    Wound Culture:   No results found for: WOUNDCULT    Ins and Outs:  I/O last 24 hours: In: 8890 [P O :330; I V :2000]  Out: 1705 [Urine:1000; Drains:700; Blood:5]          Physical:  Vitals:    01/21/21 0745   BP: 167/77   Pulse: 95   Resp: 18   Temp: 99 2 °F (37 3 °C)   SpO2: 98%     Musculoskeletal: right Lower Extremity  · Skin:  Right lower extremity swelling as to be expected postoperatively  No erythema  · Dressing:  Clean, dry, and intact  · TTP:  Carole-incisional tenderness as expected postoperatively  · SILT s/s/sp/dp/t  +fhl/ehl, +ankle dorsi/plantar flexion  · Thigh and calf compartments are soft and compressible without pain  · 2+ DP pulse, limb is warm and well perfused with good color and capillary refill distally  Assessment:    78 y  o female postoperative day 1 status post right total knee arthroplasty, ABLA Plan:  · WBAT RLE  · Hemoglobin 11 5 today, down from 13 8 preoperatively  Greater than 2 gram drop which qualifies for diagnosis of acute blood loss anemia, will monitor and administer IVF/prbc as indicated   · PT/OT  · Pain control and ice prn   · DVT ppx:  Arixtra, SCDs, early ambulation  · Dispo: Petra Paz for discharge from ortho perspective, pending PT/OT clearance      Juliana East PA-C

## 2021-01-22 ENCOUNTER — APPOINTMENT (OUTPATIENT)
Dept: PHYSICAL THERAPY | Facility: CLINIC | Age: 71
End: 2021-01-22
Payer: MEDICARE

## 2021-01-22 VITALS
BODY MASS INDEX: 26.37 KG/M2 | RESPIRATION RATE: 18 BRPM | TEMPERATURE: 98.6 F | HEIGHT: 68 IN | WEIGHT: 174 LBS | SYSTOLIC BLOOD PRESSURE: 159 MMHG | OXYGEN SATURATION: 98 % | HEART RATE: 94 BPM | DIASTOLIC BLOOD PRESSURE: 67 MMHG

## 2021-01-22 LAB
ANION GAP SERPL CALCULATED.3IONS-SCNC: 7 MMOL/L (ref 4–13)
BUN SERPL-MCNC: 7 MG/DL (ref 7–25)
CALCIUM SERPL-MCNC: 8.7 MG/DL (ref 8.6–10.5)
CHLORIDE SERPL-SCNC: 99 MMOL/L (ref 98–107)
CO2 SERPL-SCNC: 26 MMOL/L (ref 21–31)
CREAT SERPL-MCNC: 0.49 MG/DL (ref 0.6–1.2)
GFR SERPL CREATININE-BSD FRML MDRD: 100 ML/MIN/1.73SQ M
GLUCOSE SERPL-MCNC: 126 MG/DL (ref 65–99)
HCT VFR BLD AUTO: 29.9 % (ref 42–47)
HGB BLD-MCNC: 10.3 G/DL (ref 12–16)
POTASSIUM SERPL-SCNC: 3.8 MMOL/L (ref 3.5–5.5)
SODIUM SERPL-SCNC: 132 MMOL/L (ref 134–143)

## 2021-01-22 PROCEDURE — 80048 BASIC METABOLIC PNL TOTAL CA: CPT | Performed by: PHYSICIAN ASSISTANT

## 2021-01-22 PROCEDURE — 97535 SELF CARE MNGMENT TRAINING: CPT

## 2021-01-22 PROCEDURE — 99024 POSTOP FOLLOW-UP VISIT: CPT | Performed by: ORTHOPAEDIC SURGERY

## 2021-01-22 PROCEDURE — 85014 HEMATOCRIT: CPT | Performed by: PHYSICIAN ASSISTANT

## 2021-01-22 PROCEDURE — 97116 GAIT TRAINING THERAPY: CPT

## 2021-01-22 PROCEDURE — 97110 THERAPEUTIC EXERCISES: CPT

## 2021-01-22 PROCEDURE — 97530 THERAPEUTIC ACTIVITIES: CPT

## 2021-01-22 PROCEDURE — 85018 HEMOGLOBIN: CPT | Performed by: PHYSICIAN ASSISTANT

## 2021-01-22 RX ORDER — OXYCODONE HYDROCHLORIDE AND ACETAMINOPHEN 5; 325 MG/1; MG/1
1 TABLET ORAL EVERY 4 HOURS PRN
Qty: 50 TABLET | Refills: 0 | Status: SHIPPED | OUTPATIENT
Start: 2021-01-22 | End: 2021-02-01

## 2021-01-22 RX ORDER — ASPIRIN 325 MG
325 TABLET, DELAYED RELEASE (ENTERIC COATED) ORAL 2 TIMES DAILY
Qty: 30 TABLET | Refills: 0 | Status: SHIPPED | OUTPATIENT
Start: 2021-01-22 | End: 2021-02-04 | Stop reason: SDUPTHER

## 2021-01-22 RX ORDER — DOCUSATE SODIUM 100 MG/1
100 CAPSULE, LIQUID FILLED ORAL 2 TIMES DAILY
Qty: 60 CAPSULE | Refills: 0 | Status: SHIPPED | OUTPATIENT
Start: 2021-01-22 | End: 2021-04-15

## 2021-01-22 RX ORDER — CLINDAMYCIN HYDROCHLORIDE 300 MG/1
300 CAPSULE ORAL 3 TIMES DAILY
Qty: 15 CAPSULE | Refills: 0 | Status: SHIPPED | OUTPATIENT
Start: 2021-01-22 | End: 2021-01-27

## 2021-01-22 RX ADMIN — FONDAPARINUX SODIUM 2.5 MG: 2.5 INJECTION, SOLUTION SUBCUTANEOUS at 09:43

## 2021-01-22 RX ADMIN — DOCUSATE SODIUM 100 MG: 100 CAPSULE, LIQUID FILLED ORAL at 09:42

## 2021-01-22 RX ADMIN — METOPROLOL SUCCINATE 25 MG: 25 TABLET, EXTENDED RELEASE ORAL at 09:40

## 2021-01-22 RX ADMIN — MAGNESIUM GLUCONATE 500 MG ORAL TABLET 400 MG: 500 TABLET ORAL at 09:43

## 2021-01-22 RX ADMIN — KETOROLAC TROMETHAMINE 15 MG: 30 INJECTION, SOLUTION INTRAMUSCULAR; INTRAVENOUS at 08:30

## 2021-01-22 RX ADMIN — Medication 1 TABLET: at 09:41

## 2021-01-22 RX ADMIN — OXYCODONE HYDROCHLORIDE AND ACETAMINOPHEN 1 TABLET: 5; 325 TABLET ORAL at 10:13

## 2021-01-22 RX ADMIN — Medication 1 TABLET: at 09:42

## 2021-01-22 RX ADMIN — PANTOPRAZOLE SODIUM 40 MG: 40 TABLET, DELAYED RELEASE ORAL at 05:39

## 2021-01-22 RX ADMIN — KETOROLAC TROMETHAMINE 15 MG: 30 INJECTION, SOLUTION INTRAMUSCULAR; INTRAVENOUS at 03:56

## 2021-01-22 RX ADMIN — Medication 2000 UNITS: at 09:42

## 2021-01-22 RX ADMIN — MORPHINE SULFATE 3 MG: 4 INJECTION INTRAVENOUS at 07:37

## 2021-01-22 RX ADMIN — OXYCODONE HYDROCHLORIDE AND ACETAMINOPHEN 1 TABLET: 5; 325 TABLET ORAL at 05:38

## 2021-01-22 RX ADMIN — OXYCODONE HYDROCHLORIDE AND ACETAMINOPHEN 500 MG: 500 TABLET ORAL at 09:40

## 2021-01-22 RX ADMIN — MORPHINE SULFATE 3 MG: 4 INJECTION INTRAVENOUS at 11:52

## 2021-01-22 RX ADMIN — FERROUS SULFATE TAB 325 MG (65 MG ELEMENTAL FE) 325 MG: 325 (65 FE) TAB at 09:41

## 2021-01-22 RX ADMIN — FOLIC ACID 1 MG: 1 TABLET ORAL at 09:42

## 2021-01-22 NOTE — PLAN OF CARE
Problem: OCCUPATIONAL THERAPY ADULT  Goal: Performs self-care activities at highest level of function for planned discharge setting  See evaluation for individualized goals  Description: Treatment Interventions: ADL retraining, Functional transfer training, Equipment evaluation/education, Neuromuscular reeducation, Energy conservation, Endurance training    See flowsheet documentation for full assessment, interventions and recommendations  Outcome: Progressing  Note: Limitation: Decreased ADL status, Decreased Safe judgement during ADL, Decreased endurance, Decreased self-care trans, Decreased high-level ADLs  Prognosis: Good  Assessment: Patient participated in Skilled OT session this date with interventions consisting of functional transfer training, ADL re training with the use of correct body mechnaics, Energy Conservation techniques and increase dynamic sit/ stand balance during functional activity    Patient agreeable to OT treatment session, upon arrival patient was found seated OOB to Chair  Patient was set up for ADL while seated OOB in chair  Completes UB bathing/dressing with set up and supervision; requires Min A with LB bathing (only to bathe R foot) and is I to doff/don L sock and D to doff/don R sock  Patient transfers sit to stand with supervision, ambulates to/from toilet with supervision x 1, and completes all aspects of toileting with supervision for safety  Patient then requested to lie down, transferring sit to supine with CGA  Patient did report pain 3/10 to start, however, at end of session, pain had increased to 5/10 in R hip  Session was ended with patient supine in bed, SCD reapplied to L LE, and all needs met  In comparison to previous session, patient with improvements in endurance, self care, and functional transfers  Patient requiring occasional rest periods, verbal cues for pacing thru activity steps and ocassional safety reminders   Patient performance  demonstrated good carryover of learned techniques and strategies to facilitate safety during functional tasks  Patient continues to be functioning below baseline level, occupational performance remains limited secondary to factors listed above and increased risk for falls and injury  From OT standpoint, recommendation at time of d/c would be Home OT  Patient to benefit from continued Occupational Therapy treatment while in the hospital to address deficits as defined above and maximize level of functional independence with ADLs and functional mobility in order to return to PLOF        OT Discharge Recommendation: Home with skilled therapy  OT - OK to Discharge: Yes(when medically cleared)

## 2021-01-22 NOTE — OCCUPATIONAL THERAPY NOTE
01/22/21 1106   OT Last Visit   OT Visit Date 01/22/21   Note Type   Note Type Treatment   Restrictions/Precautions   Weight Bearing Precautions Per Order Yes   RLE Weight Bearing Per Order FWB   Other Precautions WBS; Fall Risk;Pain;Multiple lines   Pain Assessment   Pain Assessment Tool 0-10   Pain Score 3   Pain Location/Orientation Orientation: Right;Location: Knee   Pain Onset/Description Onset: Ongoing;Frequency: Constant/Continuous; Descriptor: Aching;Descriptor: Sore   Patient's Stated Pain Goal No pain   Hospital Pain Intervention(s) Medication (See MAR)   Transfers   Sit to Stand 5  Supervision   Additional items Assist x 1; Armrests; Verbal cues; Increased time required   Stand to Sit 5  Supervision   Additional items Assist x 1; Armrests; Increased time required;Verbal cues   Additional Comments Completed 10 steps with PT/OT (see PT note for details)   Therapeutic Excerise-Strength   UE Strength Yes   Right Upper Extremity- Strength   R Shoulder Flexion; Extension;Horizontal ABduction  (horiz  adduction, scapular protraction/retraction)   R Elbow Elbow flexion;Elbow extension   R Wrist   (wrist rotation)   R Weight/Reps/Sets 1 pound weight x 30 reps   Left Upper Extremity-Strength   L Weights/Reps/Sets TE same as R UE above   Cognition   Overall Cognitive Status WFL   Arousal/Participation Alert; Cooperative   Attention Within functional limits   Orientation Level Oriented X4   Memory Within functional limits   Following Commands Follows one step commands without difficulty   Comments PT agreeable to OT treatment  Activity Tolerance   Activity Tolerance Patient tolerated treatment well   Assessment   Assessment Patient participated in Skilled OT session this date with interventions consisting of functional transfer training, Energy Conservation techniques and therapeutic exercise to: increase functional use of BUEs, increase BUE muscle strength     Patient agreeable to OT treatment session, upon arrival patient was found seated OOB to Chair  Patient transfers sit to stand and pivots to w/c with S x 1  Patient then completed 5 steps x 2 with supervision (see PT note for details)  Upon returning to room, patient transfers into bed to complete UB TE using 1 pound weight as detailed in flow sheet  OT session was ended with patient supine in bed, L SCD reapplied, and all needs met  In comparison to previous session, patient with improvements in functional transfers/mobility, UB strength and endurance  Patient requiring verbal cues for safety, verbal cues for correct technique, verbal cues for pacing thru activity steps and ocassional safety reminders  Patient performance  demonstrated good carryover of learned techniques and strategies to facilitate safety during functional tasks  Patient continues to be functioning below baseline level, occupational performance remains limited secondary to factors listed above and increased risk for falls and injury  From OT standpoint, recommendation at time of d/c would be Home OT  Patient to benefit from continued Occupational Therapy treatment while in the hospital to address deficits as defined above and maximize level of functional independence with ADLs and functional mobility in order to return to PLOF  Plan   Treatment Interventions UE strengthening/ROM; Functional transfer training;Energy conservation   Goal Expiration Date 01/30/21   OT Treatment Day 2   OT Frequency 3-5x/wk   Recommendation   OT Discharge Recommendation Home with skilled therapy   OT - OK to Discharge Yes  (when medically cleasred)   VINICIUS Doan

## 2021-01-22 NOTE — DISCHARGE INSTRUCTIONS
TOTAL KNEE/TOTAL HIP REPLACEMENT  POST OPERATIVE INFORMATION    1  You should use a walker or crutches, but you may put as much weight on the leg as is comfortable unless instructed otherwise  2  You may use ice packs as needed for pain and swelling  20 minutes is required to allow the cold to penetrate deep enough  Cover skin with a layer of cloth before applying the ice pack  3  Pump your ankle up and down frequently throughout the day to facilitate circulation, maintain muscle tone, and to aid in reduction of swelling  4  You may shower after 5 days, but remember to keep the dressings dry  5  You should call our office if you experience pain not controlled by your medication, develop a fever, and experience increased drainage or redness around the incision  6  Do not drive a vehicle until you see your physician at the follow-up appointment  7  Refer to your total joint card regarding the need for antibiotics for the following procedures:  Any dental procedures, any infection, especially skin infections, vaginal or GYN exams or surgery, and colonoscopy  8  If you have had a total hip replacement following instructions below to prevent the hip from sliding out a position:   -DO NOT bend your hip more than 90°  This means no squatting, no bending over to tie your shoes, and no bending from the waist to  things from the floor, even if seated  -DO NOT cross your operated leg/foot inward ([place]-toed)   -ALWAYS sleep with pillow or cushion between your legs, as instructed by your doctor   -After surgery these precautions will be again covered by your therapists on a daily basis  9  Call our office for an appointment to see Dr Zainab Galan in about 14 days  10  You should start outpatient therapy as soon as possible after discharge

## 2021-01-22 NOTE — PHYSICAL THERAPY NOTE
Physical Therapy Treatment Session Note    Patient's Name: Sylvie Hannah    Admitting Diagnosis  Primary osteoarthritis of right knee [M17 11]    Problem List  Patient Active Problem List   Diagnosis    Impingement syndrome of left shoulder    Impingement syndrome of right shoulder    Radiculopathy, cervical region    Primary osteoarthritis of both knees    Degenerative joint disease of right knee    PVC's (premature ventricular contractions)    Hypertension    Hyperlipidemia    GERD (gastroesophageal reflux disease)       Past Medical History  Past Medical History:   Diagnosis Date    Cardiac murmur due to mitral valve disorder     GERD (gastroesophageal reflux disease)     Hyperlipidemia     Hypertension     PVC's (premature ventricular contractions)        Past Surgical History  Past Surgical History:   Procedure Laterality Date    CARPAL TUNNEL RELEASE Bilateral     CATARACT EXTRACTION Bilateral     KNEE SURGERY Right     PLANTAR'S WART EXCISION Right     [place] TOTAL KNEE ARTHROPLASTY Right 1/20/2021    Procedure: KNEE TOTAL ARTHROPLASTY;  Surgeon: Caty Russell DO;  Location: [address] [place] [address] MAIN OR;  Service: Orthopedics    TONSILLECTOMY      WRIST SURGERY Bilateral         01/22/21 1028   PT Last Visit   PT Visit Date 01/22/21   Note Type   Note Type BID visit/treatment   Pain Assessment   Pain Assessment Tool 0-10   Pain Score 2  (2/10 at rest,increased to 5/10 w/activity)   Pain Location/Orientation Orientation: Right;Location: Knee   Pain Onset/Description Onset: Ongoing;Frequency: Constant/Continuous; Descriptor: Aching;Descriptor: Sore   Effect of Pain on Daily Activities yes   Patient's Stated Pain Goal No pain   Hospital Pain Intervention(s) Medication (See MAR); Repositioned; Ambulation/increased activity; Elevated;Cold applied   Multiple Pain Sites No   Restrictions/Precautions   Weight Bearing Precautions Per Order Yes   RLE Weight Bearing Per Order FWB   Other Precautions WBS; Fall Risk;Pain General   Chart Reviewed Yes   Response to Previous Treatment Patient with no complaints from previous session  Family/Caregiver Present No   Cognition   Overall Cognitive Status WFL   Arousal/Participation Alert; Responsive; Cooperative   Attention Within functional limits   Orientation Level Oriented X4   Memory Within functional limits   Following Commands Follows all commands and directions without difficulty   Comments Pt  agreeable to tx session participation  Subjective   Subjective "I feel better after eating"   Bed Mobility   Supine to Sit   (CGA)   Additional items Assist x 1;Bedrails; Increased time required;Verbal cues;LE management   Sit to Supine   (CGA)   Additional items Assist x 1;Bedrails; Increased time required;Verbal cues;LE management   Transfers   Sit to Stand 5  Supervision   Additional items Assist x 1;Bedrails; Increased time required;Verbal cues   Stand to Sit 5  Supervision   Additional items Assist x 1;Bedrails; Increased time required;Verbal cues   Stand pivot 5  Supervision   Additional items Assist x 1; Increased time required;Verbal cues   Toilet transfer 5  Supervision   Additional items Assist x 1; Increased time required;Verbal cues;Standard toilet; Other  (R grab bar)   Ambulation/Elevation   Gait pattern Improper Weight shift; Antalgic; Forward Flexion;Decreased foot clearance;Decreased R stance; Step to;Short stride   Gait Assistance 5  Supervision   Additional items Assist x 1;Verbal cues   Assistive Device Rolling walker   Distance 25 feet x 2  (to/from w/c for transport to steps)   Stair Management Assistance 5  Supervision   Additional items Assist x 1;Verbal cues; Tactile cues   Stair Management Technique Two rails; Step to pattern; With gait belt   Number of Stairs 5  (x 2 )   Balance   Static Sitting Normal   Dynamic Sitting Good   Static Standing Good   Dynamic Standing Fair +   Ambulatory Fair +   Endurance Deficit   Endurance Deficit Yes   Endurance Deficit Description Pain w/WB tasks  Activity Tolerance   Activity Tolerance Patient tolerated treatment well   Medical Staff Made Aware Yes, Ene SINGERC was informed via tiger text of PT clearance for d/c  Nurse Made Aware yes, Leanne RN   Exercises   Quad Sets Supine;25 reps;AROM; Bilateral   Glute Sets Supine;25 reps;AROM; Bilateral   Hip Abduction Supine;25 reps;AROM; Bilateral   Hip Adduction Supine;25 reps;AROM; Bilateral   Ankle Pumps Supine;25 reps;AROM; Bilateral   Assessment   Prognosis Good   Problem List Decreased strength;Decreased range of motion;Decreased endurance; Impaired balance;Decreased mobility;Orthopedic restrictions;Pain;Decreased skin integrity   Assessment Pt seen for PT treatment session this date with interventions consisting of gait training w/ emphasis on improving pt's ability to ambulate level surfaces x 50 feet total with close S provided by therapist with RW, Therapeutic exercise consisting of: AROM 25 reps B LE in supine position and therapeutic activity consisting of training: supine<>sit transfers, sit<>stand transfers, static sitting tolerance at EOB for 5 minutes w/ o UE support, static standing tolerance for 3 minutes w/ B UE support, vc and tactile cues for static sitting posture faciliation, vc and tactile cues for static standing posture faciliation, stand pivot transfers towards B direction and stair negotiation  Pt agreeable to PT treatment session upon arrival, pt found supine in bed w/ HOB elevated, A&O x 4  In comparison to previous session, pt with improvements in balance,functional advancement to stairs  Post session: pt returned BTB, all needs in reach and RN notified of session findings/recommendations Continue to recommend Home PT with family support at time of d/c in order to maximize pt's functional independence and safety w/ mobility  Pt continues to be functioning below baseline level, and remains limited 2* factors listed above and including weakness, pain, gait deviations   PT will continue to see pt while here in order to address the deficits listed above and provide interventions consistent w/ POC in effort to achieve LTGs  Barriers to Discharge None   Goals   Patient Goals to go home soon   LTG Expiration Date 01/30/21   Long Term Goal #1 LTGs remain appropriate   PT Treatment Day 4   Plan   Treatment/Interventions ADL retraining;Functional transfer training;LE strengthening/ROM; Therapeutic exercise; Endurance training;Patient/family training;Equipment eval/education; Bed mobility;Gait training;Spoke to nursing;Spoke to advanced practitioner;OT   Progress Improving as expected   PT Frequency Twice a day   Recommendation   PT Discharge Recommendation Return to previous environment with social support;Home with skilled therapy  (continued recommendation)   Equipment Recommended Walker  (pt  was distributed own by CM, I adjusted it during session)   PT - OK to Discharge Yes  (if medically stable, from functional perspective)   Additional Comments Upon conclusion, pt  was resting in bed w/all eneds within reach  Additional Comments 2 [facility] raw score of 19, standardized score of 42 48  As per the data, this score is correlated most closely with a home recommendation  However,the therapist's discharge disposition recommendation may vary as numerous social factors and objective findings contribute to the suggested destination     AM-PAC Basic Mobility Inpatient   Turning in Bed Without Bedrails 4   Lying on Back to Sitting on Edge of Flat Bed 3   Moving Bed to Chair 3   Standing Up From Chair 3   Walk in Room 3   Climb 3-5 Stairs 3   Basic Mobility Inpatient Raw Score 19   Basic Mobility Standardized Score 42 48     Treatment Time: 0345-4923    Sunny Gore PT

## 2021-01-22 NOTE — DISCHARGE SUMMARY
Discharge Summary - Sylvie Hannah 78 y o  female MRN: [id number]    Unit/Bed#: -01 Encounter: [id number]    Admission Date:   Admission Orders (From admission, onward)     Ordered        01/20/21 1215  Inpatient Admission  Once                     Admitting Diagnosis: Primary osteoarthritis of right knee [M17 11]    Procedures Performed:  Elective right total knee replacement    Summary of Hospital Course:   81-year-old female presents to the hospital for elective right total knee replacement  The patient tolerated the procedure quite well  On postop day 1, she was placed on Arixtra for DVT prophylaxis  She began working with Physical therapy and Occupational therapy weight-bearing as tolerated  On postop day 2, her dressing was changed and her leg drain was removed  Her incision was clean, dry and intact  She was then deemed suitable for discharge to home  She will be discharged on aspirin 325 mg b i d  For DVT prophylaxis secondary to insurance reasons  The patient is acceptable to this plan  The patient was discharged with pain medication, vitamins, DVT prophylaxis, and also an elevated commode and walker  The patient was instructed to paint incision with betadine three times per day  Discharge Diagnosis:  Primary osteoarthritis of the right knee, status post right total knee replacement    Condition at Discharge: stable         Discharge instructions/Information to patient and family:   See after visit summary for information provided to patient and family  Provisions for Follow-Up Care:  See after visit summary for information related to follow-up care and any pertinent home health orders  PCP: Brina Fleming MD    Disposition: Home    Planned Readmission: No      Discharge Statement   I spent 30 minutes discharging the patient  This time was spent on the day of discharge  I had direct contact with the patient on the day of discharge   Additional documentation is required if more than 30 minutes were spent on discharge  Discharge Medications:  See after visit summary for reconciled discharge medications provided to patient and family

## 2021-01-22 NOTE — PHYSICAL THERAPY NOTE
Physical Therapy Treatment Session Note    Patient's Name: Adamaris Rojo    Admitting Diagnosis  Primary osteoarthritis of right knee [M17 11]    Problem List  Patient Active Problem List   Diagnosis    Impingement syndrome of left shoulder    Impingement syndrome of right shoulder    Radiculopathy, cervical region    Primary osteoarthritis of both knees    Degenerative joint disease of right knee    PVC's (premature ventricular contractions)    Hypertension    Hyperlipidemia    GERD (gastroesophageal reflux disease)       Past Medical History  Past Medical History:   Diagnosis Date    Cardiac murmur due to mitral valve disorder     GERD (gastroesophageal reflux disease)     Hyperlipidemia     Hypertension     PVC's (premature ventricular contractions)        Past Surgical History  Past Surgical History:   Procedure Laterality Date    CARPAL TUNNEL RELEASE Bilateral     CATARACT EXTRACTION Bilateral     KNEE SURGERY Right     PLANTAR'S WART EXCISION Right     [place] TOTAL KNEE ARTHROPLASTY Right 1/20/2021    Procedure: KNEE TOTAL ARTHROPLASTY;  Surgeon: Sherrill Felix DO;  Location: [facility] MAIN OR;  Service: Orthopedics    TONSILLECTOMY      WRIST SURGERY Bilateral         01/22/21 0759   PT Last Visit   PT Visit Date 01/22/21   Note Type   Note Type Treatment   Pain Assessment   Pain Assessment Tool 0-10   Pain Score 3  (3/10 at rest,5/10 with activity)   Pain Location/Orientation Orientation: Right;Location: Knee   Pain Onset/Description Onset: Ongoing;Frequency: Constant/Continuous; Descriptor: Aching;Descriptor: Sore   Effect of Pain on Daily Activities yes   Patient's Stated Pain Goal No pain   Hospital Pain Intervention(s) Medication (See MAR); Repositioned; Ambulation/increased activity; Elevated   Multiple Pain Sites No   Restrictions/Precautions   Weight Bearing Precautions Per Order Yes   RLE Weight Bearing Per Order FWB   Other Precautions WBS; Fall Risk;Pain;Multiple lines   General   Chart Reviewed Yes   Response to Previous Treatment Patient with no complaints from previous session  Family/Caregiver Present No   Cognition   Overall Cognitive Status WFL   Arousal/Participation Alert; Cooperative;Responsive   Attention Within functional limits   Orientation Level Oriented X4   Memory Within functional limits   Following Commands Follows one step commands without difficulty   Comments Pt  agreeable to PT tx session, pleasant  Subjective   Subjective "yeah,we can move around"   Bed Mobility   Supine to Sit   (CGA)   Additional items Assist x 1;Bedrails; Increased time required;Verbal cues   Sit to Supine   (CGA)   Additional items Assist x 1;Bedrails; Increased time required;Verbal cues   Additional Comments Pt denied lightheadness and dizziness upon positional change  Transfers   Sit to Stand 5  Supervision   Additional items Assist x 1;Bedrails; Increased time required;Verbal cues   Stand to Sit 5  Supervision   Additional items Assist x 1;Bedrails; Increased time required;Verbal cues   Stand pivot 5  Supervision   Additional items Assist x 1; Increased time required;Verbal cues   Toilet transfer 5  Supervision   Additional items Assist x 1; Increased time required;Verbal cues;Standard toilet; Other  (R grab bar)   Additional Comments Increased time allowed on toilet  Full ADLs complete at bedside ~20 minutes w/Rody COLVIN  Please see detailed OT note for additional information  Ambulation/Elevation   Gait pattern Improper Weight shift; Antalgic;Decreased foot clearance;Decreased R stance; Short stride   Gait Assistance 5  Supervision   Additional items Assist x 1;Verbal cues; Tactile cues   Assistive Device Rolling walker   Distance 65 feet, then 40 feet  (beside->doorway/chair, 2nd trial from bathroom)   Stair Management Assistance Not tested  (will be trialed later this time)   Balance   Static Sitting Normal   Dynamic Sitting Good   Static Standing Fair +   Dynamic Standing Fair +   Ambulatory Fair + Endurance Deficit   Endurance Deficit Yes   Endurance Deficit Description Fatigue,pain, nausea w/WB tasks  Activity Tolerance   Activity Tolerance Patient limited by fatigue;Patient limited by pain   Medical Staff Made Aware yes, Dr Iris Alfredo RN provided pain medication  Assessment   Prognosis Good   Problem List Decreased strength;Decreased range of motion;Decreased endurance; Impaired balance;Decreased mobility;Orthopedic restrictions;Pain   Assessment Pt seen for PT treatment session this date with interventions consisting of gait training w/ emphasis on improving pt's ability to ambulate level surfaces x 105 feet total with close S provided by therapist with RW and therapeutic activity consisting of training: supine<>sit transfers, sit<>stand transfers, static sitting tolerance at EOB for ~5 minutes w/ o UE support, static standing tolerance for >3 minutes w/ B UE support, vc and tactile cues for static sitting posture faciliation, vc and tactile cues for static standing posture faciliation, stand pivot transfers towards B direction and toileting,ADL completion  Pt agreeable to PT treatment session upon arrival, pt found supine in bed w/ HOB elevated, A&O x 4  In comparison to previous session, pt with improvements in ambulatory distance,balance  Post session: pt returned BTB, all needs in reach and RN notified of session findings/recommendations Continue to recommend Home PT with family support at time of d/c in order to maximize pt's functional independence and safety w/ mobility  Pt continues to be functioning below baseline level, and remains limited 2* factors listed above and including weakness, pain, gait deviations,decreased endurance  PT will continue to see pt while here in order to address the deficits listed above and provide interventions consistent w/ POC in effort to achieve LTGs     Barriers to Discharge Inaccessible home environment   Barriers to Discharge Comments steps to be trialed 2nd session, as pt  did not feel up to it this am    Goals   Patient Goals to have less nausea   LTG Expiration Date 01/30/21   Long Term Goal #1 LTGs remain appropriate   PT Treatment Day 3   Plan   Treatment/Interventions Functional transfer training;LE strengthening/ROM;ADL retraining; Therapeutic exercise; Endurance training;Patient/family training;Equipment eval/education; Bed mobility;Gait training;Spoke to nursing;OT;Spoke to case management;Spoke to MD   Progress Progressing toward goals   PT Frequency Twice a day   Recommendation   PT Discharge Recommendation Home with skilled therapy; Return to previous environment with social support  (continued recommendation)   Equipment Recommended Walker   PT - OK to Discharge No   Additional Comments Upon conclusion, pt  was resting in bed w/all needs within reach       Treatment Time: 3797-3305    Idalia Mina, PT

## 2021-01-22 NOTE — PLAN OF CARE
Problem: Prexisting or High Potential for Compromised Skin Integrity  Goal: Skin integrity is maintained or improved  Description: INTERVENTIONS:  - Identify patients at risk for skin breakdown  - Assess and monitor skin integrity  - Assess and monitor nutrition and hydration status  - Monitor labs   - Assess for incontinence   - Turn and reposition patient  - Assist with mobility/ambulation  - Relieve pressure over bony prominences  - Avoid friction and shearing  - Provide appropriate hygiene as needed including keeping skin clean and dry  - Evaluate need for skin moisturizer/barrier cream  - Collaborate with interdisciplinary team   - Patient/family teaching  - Consider wound care consult   Outcome: Progressing     Problem: Potential for Falls  Goal: Patient will remain free of falls  Description: INTERVENTIONS:  - Assess patient frequently for physical needs  -  Identify cognitive and physical deficits and behaviors that affect risk of falls    -  [place] fall precautions as indicated by assessment   - Educate patient/family on patient safety including physical limitations  - Instruct patient to call for assistance with activity based on assessment  - Modify environment to reduce risk of injury  - Consider OT/PT consult to assist with strengthening/mobility  Outcome: Progressing     Problem: PAIN - ADULT  Goal: Verbalizes/displays adequate comfort level or baseline comfort level  Description: Interventions:  - Encourage patient to monitor pain and request assistance  - Assess pain using appropriate pain scale  - Administer analgesics based on type and severity of pain and evaluate response  - Implement non-pharmacological measures as appropriate and evaluate response  - Consider cultural and social influences on pain and pain management  - Notify physician/advanced practitioner if interventions unsuccessful or patient reports new pain  Outcome: Progressing     Problem: INFECTION - ADULT  Goal: Absence or prevention of progression during hospitalization  Description: INTERVENTIONS:  - Assess and monitor for signs and symptoms of infection  - Monitor lab/diagnostic results  - Monitor all insertion sites, i e  indwelling lines, tubes, and drains  - Monitor endotracheal if appropriate and nasal secretions for changes in amount and color  - [place] appropriate cooling/warming therapies per order  - Administer medications as ordered  - Instruct and encourage patient and family to use good hand hygiene technique  - Identify and instruct in appropriate isolation precautions for identified infection/condition  Outcome: Progressing  Goal: Absence of fever/infection during neutropenic period  Description: INTERVENTIONS:  - Monitor WBC    Outcome: Progressing     Problem: SAFETY ADULT  Goal: Patient will remain free of falls  Description: INTERVENTIONS:  - Assess patient frequently for physical needs  -  Identify cognitive and physical deficits and behaviors that affect risk of falls    -  [place] fall precautions as indicated by assessment   - Educate patient/family on patient safety including physical limitations  - Instruct patient to call for assistance with activity based on assessment  - Modify environment to reduce risk of injury  - Consider OT/PT consult to assist with strengthening/mobility  Outcome: Progressing  Goal: Maintain or return to baseline ADL function  Description: INTERVENTIONS:  -  Assess patient's ability to carry out ADLs; assess patient's baseline for ADL function and identify physical deficits which impact ability to perform ADLs (bathing, care of mouth/teeth, toileting, grooming, dressing, etc )  - Assess/evaluate cause of self-care deficits   - Assess range of motion  - Assess patient's mobility; develop plan if impaired  - Assess patient's need for assistive devices and provide as appropriate  - Encourage maximum independence but intervene and supervise when necessary  - Involve family in performance of ADLs  - Assess for home care needs following discharge   - Consider OT consult to assist with ADL evaluation and planning for discharge  - Provide patient education as appropriate  Outcome: Progressing  Goal: Maintain or return mobility status to optimal level  Description: INTERVENTIONS:  - Assess patient's baseline mobility status (ambulation, transfers, stairs, etc )    - Identify cognitive and physical deficits and behaviors that affect mobility  - Identify mobility aids required to assist with transfers and/or ambulation (gait belt, sit-to-stand, lift, walker, cane, etc )  - [place] fall precautions as indicated by assessment  - Record patient progress and toleration of activity level on Mobility SBAR; progress patient to next Phase/Stage  - Instruct patient to call for assistance with activity based on assessment  - Consider rehabilitation consult to assist with strengthening/weightbearing, etc   Outcome: Progressing     Problem: DISCHARGE PLANNING  Goal: Discharge to home or other facility with appropriate resources  Description: INTERVENTIONS:  - Identify barriers to discharge w/patient and caregiver  - Arrange for needed discharge resources and transportation as appropriate  - Identify discharge learning needs (meds, wound care, etc )  - Arrange for interpretive services to assist at discharge as needed  - Refer to Case Management Department for coordinating discharge planning if the patient needs post-hospital services based on physician/advanced practitioner order or complex needs related to functional status, cognitive ability, or social support system  Outcome: Progressing     Problem: Knowledge Deficit  Goal: Patient/family/caregiver demonstrates understanding of disease process, treatment plan, medications, and discharge instructions  Description: Complete learning assessment and assess knowledge base    Interventions:  - Provide teaching at level of understanding  - Provide teaching via preferred learning methods  Outcome: Progressing     Problem: CARDIOVASCULAR - ADULT  Goal: Maintains optimal cardiac output and hemodynamic stability  Description: INTERVENTIONS:  - Monitor I/O, vital signs and rhythm  - Monitor for S/S and trends of decreased cardiac output  - Administer and titrate ordered vasoactive medications to optimize hemodynamic stability  - Assess quality of pulses, skin color and temperature  - Assess for signs of decreased coronary artery perfusion  - Instruct patient to report change in severity of symptoms  Outcome: Progressing  Goal: Absence of cardiac dysrhythmias or at baseline rhythm  Description: INTERVENTIONS:  - Continuous cardiac monitoring, vital signs, obtain 12 lead EKG if ordered  - Administer antiarrhythmic and heart rate control medications as ordered  - Monitor electrolytes and administer replacement therapy as ordered  Outcome: Progressing     Problem: METABOLIC, FLUID AND ELECTROLYTES - ADULT  Goal: Electrolytes maintained within normal limits  Description: INTERVENTIONS:  - Monitor labs and assess patient for signs and symptoms of electrolyte imbalances  - Administer electrolyte replacement as ordered  - Monitor response to electrolyte replacements, including repeat lab results as appropriate  - Instruct patient on fluid and nutrition as appropriate  Outcome: Progressing  Goal: Fluid balance maintained  Description: INTERVENTIONS:  - Monitor labs   - Monitor I/O and WT  - Instruct patient on fluid and nutrition as appropriate  - Assess for signs & symptoms of volume excess or deficit  Outcome: Progressing  Goal: Glucose maintained within target range  Description: INTERVENTIONS:  - Monitor Blood Glucose as ordered  - Assess for signs and symptoms of hyperglycemia and hypoglycemia  - Administer ordered medications to maintain glucose within target range  - Assess nutritional intake and initiate nutrition service referral as needed  Outcome: Progressing Problem: SKIN/TISSUE INTEGRITY - ADULT  Goal: Skin integrity remains intact  Description: INTERVENTIONS  - Identify patients at risk for skin breakdown  - Assess and monitor skin integrity  - Assess and monitor nutrition and hydration status  - Monitor labs (i e  albumin)  - Assess for incontinence   - Turn and reposition patient  - Assist with mobility/ambulation  - Relieve pressure over bony prominences  - Avoid friction and shearing  - Provide appropriate hygiene as needed including keeping skin clean and dry  - Evaluate need for skin moisturizer/barrier cream  - Collaborate with interdisciplinary team (i e  Nutrition, Rehabilitation, etc )   - Patient/family teaching  Outcome: Progressing  Goal: Incision(s), wounds(s) or drain site(s) healing without S/S of infection  Description: INTERVENTIONS  - Assess and document risk factors for skin impairment   - Assess and document dressing, incision, wound bed, drain sites and surrounding tissue  - Consider nutrition services referral as needed  - Oral mucous membranes remain intact  - Provide patient/ family education  Outcome: Progressing  Goal: Oral mucous membranes remain intact  Description: INTERVENTIONS  - Assess oral mucosa and hygiene practices  - Implement preventative oral hygiene regimen  - Implement oral medicated treatments as ordered  - Initiate Nutrition services referral as needed  Outcome: Progressing     Problem: MUSCULOSKELETAL - ADULT  Goal: Maintain or return mobility to safest level of function  Description: INTERVENTIONS:  - Assess patient's ability to carry out ADLs; assess patient's baseline for ADL function and identify physical deficits which impact ability to perform ADLs (bathing, care of mouth/teeth, toileting, grooming, dressing, etc )  - Assess/evaluate cause of self-care deficits   - Assess range of motion  - Assess patient's mobility  - Assess patient's need for assistive devices and provide as appropriate  - Encourage maximum independence but intervene and supervise when necessary  - Involve family in performance of ADLs  - Assess for home care needs following discharge   - Consider OT consult to assist with ADL evaluation and planning for discharge  - Provide patient education as appropriate  Outcome: Progressing  Goal: Maintain proper alignment of affected body part  Description: INTERVENTIONS:  - Support, maintain and protect limb and body alignment  - Provide patient/ family with appropriate education  Outcome: Progressing

## 2021-01-22 NOTE — PLAN OF CARE
Problem: OCCUPATIONAL THERAPY ADULT  Goal: Performs self-care activities at highest level of function for planned discharge setting  See evaluation for individualized goals  Description: Treatment Interventions: ADL retraining, Functional transfer training, Equipment evaluation/education, Neuromuscular reeducation, Energy conservation, Endurance training    See flowsheet documentation for full assessment, interventions and recommendations  1/22/2021 1138 by VINICIUS Garcia  Outcome: Progressing  Note: Limitation: Decreased ADL status, Decreased Safe judgement during ADL, Decreased endurance, Decreased self-care trans, Decreased high-level ADLs  Prognosis: Good  Assessment: Patient participated in Skilled OT session this date with interventions consisting of functional transfer training, Energy Conservation techniques and therapeutic exercise to: increase functional use of BUEs, increase BUE muscle strength    Patient agreeable to OT treatment session, upon arrival patient was found seated OOB to Chair  Patient transfers sit to stand and pivots to w/c with S x 1  Patient then completed 5 steps x 2 with supervision (see PT note for details)  Upon returning to room, patient transfers into bed to complete UB TE using 1 pound weight as detailed in flow sheet  OT session was ended with patient supine in bed, L SCD reapplied, and all needs met  In comparison to previous session, patient with improvements in functional transfers/mobility, UB strength and endurance  Patient requiring verbal cues for safety, verbal cues for correct technique, verbal cues for pacing thru activity steps and ocassional safety reminders  Patient performance  demonstrated good carryover of learned techniques and strategies to facilitate safety during functional tasks   Patient continues to be functioning below baseline level, occupational performance remains limited secondary to factors listed above and increased risk for falls and injury  From OT standpoint, recommendation at time of d/c would be Home OT  Patient to benefit from continued Occupational Therapy treatment while in the hospital to address deficits as defined above and maximize level of functional independence with ADLs and functional mobility in order to return to PLOF  OT Discharge Recommendation: Home with skilled therapy  OT - OK to Discharge: Yes(when medically cleasred)    1/22/2021 1014 by VINICIUS Murray  Outcome: Progressing  Note: Limitation: Decreased ADL status, Decreased Safe judgement during ADL, Decreased endurance, Decreased self-care trans, Decreased high-level ADLs  Prognosis: Good  Assessment: Patient participated in Skilled OT session this date with interventions consisting of functional transfer training, ADL re training with the use of correct body mechnaics, Energy Conservation techniques and increase dynamic sit/ stand balance during functional activity    Patient agreeable to OT treatment session, upon arrival patient was found seated OOB to Chair  Patient was set up for ADL while seated OOB in chair  Completes UB bathing/dressing with set up and supervision; requires Min A with LB bathing (only to bathe R foot) and is I to doff/don L sock and D to doff/don R sock  Patient transfers sit to stand with supervision, ambulates to/from toilet with supervision x 1, and completes all aspects of toileting with supervision for safety  Patient then requested to lie down, transferring sit to supine with CGA  Patient did report pain 3/10 to start, however, at end of session, pain had increased to 5/10 in R hip  Session was ended with patient supine in bed, SCD reapplied to L LE, and all needs met  In comparison to previous session, patient with improvements in endurance, self care, and functional transfers  Patient requiring occasional rest periods, verbal cues for pacing thru activity steps and ocassional safety reminders   Patient performance demonstrated good carryover of learned techniques and strategies to facilitate safety during functional tasks  Patient continues to be functioning below baseline level, occupational performance remains limited secondary to factors listed above and increased risk for falls and injury  From OT standpoint, recommendation at time of d/c would be Home OT  Patient to benefit from continued Occupational Therapy treatment while in the hospital to address deficits as defined above and maximize level of functional independence with ADLs and functional mobility in order to return to PLOF        OT Discharge Recommendation: Home with skilled therapy  OT - OK to Discharge: Yes(when medically cleared)

## 2021-01-22 NOTE — PLAN OF CARE
Problem: PHYSICAL THERAPY ADULT  Goal: Performs mobility at highest level of function for planned discharge setting  See evaluation for individualized goals  Description: Treatment/Interventions: Functional transfer training, LE strengthening/ROM, Therapeutic exercise, Endurance training, Patient/family training, Equipment eval/education, Bed mobility, Gait training, Continued evaluation, Spoke to nursing, Spoke to case management, OT  Equipment Recommended: (TBD)       See flowsheet documentation for full assessment, interventions and recommendations  Outcome: Progressing  Note: Prognosis: Good  Problem List: Decreased strength, Decreased range of motion, Decreased endurance, Impaired balance, Decreased mobility, Orthopedic restrictions, Pain  Assessment: Pt seen for PT treatment session this date with interventions consisting of gait training w/ emphasis on improving pt's ability to ambulate level surfaces x 105 feet total with close S provided by therapist with RW and therapeutic activity consisting of training: supine<>sit transfers, sit<>stand transfers, static sitting tolerance at EOB for ~5 minutes w/ o UE support, static standing tolerance for >3 minutes w/ B UE support, vc and tactile cues for static sitting posture faciliation, vc and tactile cues for static standing posture faciliation, stand pivot transfers towards B direction and toileting,ADL completion  Pt agreeable to PT treatment session upon arrival, pt found supine in bed w/ HOB elevated, A&O x 4  In comparison to previous session, pt with improvements in ambulatory distance,balance  Post session: pt returned BTB, all needs in reach and RN notified of session findings/recommendations Continue to recommend Home PT with family support at time of d/c in order to maximize pt's functional independence and safety w/ mobility   Pt continues to be functioning below baseline level, and remains limited 2* factors listed above and including weakness, pain, gait deviations,decreased endurance  PT will continue to see pt while here in order to address the deficits listed above and provide interventions consistent w/ POC in effort to achieve LTGs  Barriers to Discharge: Inaccessible home environment  Barriers to Discharge Comments: steps to be trialed 2nd session, as pt  did not feel up to it this am   PT Discharge Recommendation: Home with skilled therapy, Return to previous environment with social support(continued recommendation)     PT - OK to Discharge: No    See flowsheet documentation for full assessment

## 2021-01-22 NOTE — PLAN OF CARE
Problem: PHYSICAL THERAPY ADULT  Goal: Performs mobility at highest level of function for planned discharge setting  See evaluation for individualized goals  Description: Treatment/Interventions: Functional transfer training, LE strengthening/ROM, Therapeutic exercise, Endurance training, Patient/family training, Equipment eval/education, Bed mobility, Gait training, Continued evaluation, Spoke to nursing, Spoke to case management, OT  Equipment Recommended: (TBD)       See flowsheet documentation for full assessment, interventions and recommendations  1/22/2021 1301 by Capri Hennessy PT  Outcome: Progressing  Note: Prognosis: Good  Problem List: Decreased strength, Decreased range of motion, Decreased endurance, Impaired balance, Decreased mobility, Orthopedic restrictions, Pain, Decreased skin integrity  Assessment: Pt seen for PT treatment session this date with interventions consisting of gait training w/ emphasis on improving pt's ability to ambulate level surfaces x 50 feet total with close S provided by therapist with RW, Therapeutic exercise consisting of: AROM 25 reps B LE in supine position and therapeutic activity consisting of training: supine<>sit transfers, sit<>stand transfers, static sitting tolerance at EOB for 5 minutes w/ o UE support, static standing tolerance for 3 minutes w/ B UE support, vc and tactile cues for static sitting posture faciliation, vc and tactile cues for static standing posture faciliation, stand pivot transfers towards B direction and stair negotiation  Pt agreeable to PT treatment session upon arrival, pt found supine in bed w/ HOB elevated, A&O x 4  In comparison to previous session, pt with improvements in balance,functional advancement to stairs   Post session: pt returned BTB, all needs in reach and RN notified of session findings/recommendations Continue to recommend Home PT with family support at time of d/c in order to maximize pt's functional independence and safety w/ mobility  Pt continues to be functioning below baseline level, and remains limited 2* factors listed above and including weakness, pain, gait deviations  PT will continue to see pt while here in order to address the deficits listed above and provide interventions consistent w/ POC in effort to achieve LTGs  Barriers to Discharge: None  Barriers to Discharge Comments: steps to be trialed 2nd session, as pt  did not feel up to it this am   PT Discharge Recommendation: Return to previous environment with social support, Home with skilled therapy(continued recommendation)     PT - OK to Discharge: Yes(if medically stable, from functional perspective)    See flowsheet documentation for full assessment

## 2021-01-22 NOTE — PROGRESS NOTES
Progress Note - Orthopedics   Garrick Party 78 y o  female MRN: [id number]  Unit/Bed#: -01 Encounter: [id number]    Assessment:  Postop day #2, status post right total knee replacement     Plan:  Out of bed  Weightbearing as tolerated right lower extremity  PT/OT  Arixtra for DVT prophylaxis  Discharge to home today with home health care  Follow-up office 2 weeks    Weight bearing:  As tolerated    VTE Pharmacologic Prophylaxis: Fondaparinux (Arixtra)  VTE Mechanical Prophylaxis: sequential compression device    Subjective:  Patient in chair, pain has decreased    Vitals: Blood pressure 162/73, pulse 89, temperature 98 5 °F (36 9 °C), temperature source Temporal, resp  rate 18, height 5' 8" (1 727 m), weight 78 9 kg (174 lb), SpO2 97 %, not currently breastfeeding  ,Body mass index is 26 46 kg/m²  Intake/Output Summary (Last 24 hours) at 1/22/2021 5398  Last data filed at 1/21/2021 1748  Gross per 24 hour   Intake 850 ml   Output    Net 850 ml       Invasive Devices     Peripheral Intravenous Line            Peripheral IV 01/20/21 Left Arm 1 day    Peripheral IV 01/20/21 Right Hand 1 day          Drain            Autologus Reinfusion/Drain Device 1 Right Knee 1 day                Physical Exam:     Ortho Exam:    Right lower extremity is neurovascular intact  Toes are pink and mobile  Compartments are soft  Incision is clean, dry, intact  Negative Homans  Drain removed  Good dorsiflexion of the foot  Quad tone is improving  Arc of motion is intact  Peripheral pulses intact        Lab, Imaging and other studies:   CBC:   Lab Results   Component Value Date    HGB 10 3 (L) 01/22/2021    HCT 29 9 (L) 01/22/2021     CMP:   Lab Results   Component Value Date    SODIUM 132 (L) 01/22/2021    CL 99 01/22/2021    CO2 26 01/22/2021    BUN 7 01/22/2021    CREATININE 0 49 (L) 01/22/2021    CALCIUM 8 7 01/22/2021    EGFR 100 01/22/2021

## 2021-01-22 NOTE — OCCUPATIONAL THERAPY NOTE
01/22/21 0844   OT Last Visit   OT Visit Date 01/22/21   Note Type   Note Type Treatment   Restrictions/Precautions   Weight Bearing Precautions Per Order Yes   RLE Weight Bearing Per Order FWB   Other Precautions WBS; Fall Risk;Pain;Multiple lines   Pain Assessment   Pain Assessment Tool 0-10   Pain Score 5   Pain Location/Orientation Orientation: Right;Location: Hip   Pain Onset/Description Onset: Ongoing;Frequency: Constant/Continuous; Descriptor: Aching;Descriptor: Sore   Patient's Stated Pain Goal No pain   Hospital Pain Intervention(s) Medication (See MAR)   ADL   Where Assessed Chair   Grooming Assistance 5  Supervision/Setup   Grooming Deficit Setup; Increased time to complete   UB Bathing Assistance 5  Supervision/Setup   UB Bathing Deficit Setup;Supervision/safety; Increased time to complete   LB Bathing Assistance 4  Minimal Assistance   LB Bathing Deficit Setup;Verbal cueing;Supervision/safety; Increased time to complete;Right lower leg including foot   UB Dressing Assistance 5  Supervision/Setup   UB Dressing Deficit Setup; Increased time to complete; Fasteners   LB Dressing Assistance Unable to assess   LB Dressing Deficit Don/doff R sock  (I to doff/don L sock)   Toileting Assistance  5  Supervision/Setup   Toileting Deficit Setup; Increased time to complete;Verbal cueing   Bed Mobility   Sit to Supine   (CGA)   Additional items Assist x 1;Bedrails; Increased time required;Verbal cues   Transfers   Sit to Stand 5  Supervision   Additional items Assist x 1; Armrests; Verbal cues; Increased time required   Stand to Sit 5  Supervision   Additional items Assist x 1; Armrests; Increased time required;Verbal cues   Stand pivot 5  Supervision   Additional items Assist x 1; Increased time required   Toilet transfer 5  Supervision   Additional items Assist x 1; Increased time required;Verbal cues;Standard toilet  (R grab bar)   Functional Mobility   Functional Mobility 5  Supervision   Additional Comments 50 feet Additional items Rolling walker   Toilet Transfers   Toilet Transfer From Other (Comment)  (chair)   Toilet Transfer Type To and from   Toilet Transfer to [employer] Transfers Supervision   Cognition   Overall Cognitive Status [facility]   Arousal/Participation Alert; Cooperative   Attention Within functional limits   Orientation Level Oriented X4   Memory Within functional limits   Following Commands Follows one step commands without difficulty   Comments PT agreeable to OT treatment  Activity Tolerance   Activity Tolerance Patient tolerated treatment well   Assessment   Assessment Patient participated in Skilled OT session this date with interventions consisting of functional transfer training, ADL re training with the use of correct body mechnaics, Energy Conservation techniques and increase dynamic sit/ stand balance during functional activity    Patient agreeable to OT treatment session, upon arrival patient was found seated OOB to Chair  Patient was set up for ADL while seated OOB in chair  Completes UB bathing/dressing with set up and supervision; requires Min A with LB bathing (only to bathe R foot) and is I to doff/don L sock and D to doff/don R sock  Patient transfers sit to stand with supervision, ambulates to/from toilet with supervision x 1, and completes all aspects of toileting with supervision for safety  Patient then requested to lie down, transferring sit to supine with CGA  Patient did report pain 3/10 to start, however, at end of session, pain had increased to 5/10 in R hip  Session was ended with patient supine in bed, SCD reapplied to L LE, and all needs met  In comparison to previous session, patient with improvements in endurance, self care, and functional transfers  Patient requiring occasional rest periods, verbal cues for pacing thru activity steps and ocassional safety reminders   Patient performance  demonstrated good carryover of learned techniques and strategies to facilitate safety during functional tasks  Patient continues to be functioning below baseline level, occupational performance remains limited secondary to factors listed above and increased risk for falls and injury  From OT standpoint, recommendation at time of d/c would be Home OT  Patient to benefit from continued Occupational Therapy treatment while in the hospital to address deficits as defined above and maximize level of functional independence with ADLs and functional mobility in order to return to PLOF      Plan   Treatment Interventions ADL retraining;Functional transfer training;Energy conservation   Goal Expiration Date 01/30/21   OT Treatment Day 2   OT Frequency 3-5x/wk   Recommendation   OT Discharge Recommendation Home with skilled therapy   OT - OK to Discharge Yes  (when medically cleared)   VINICIUS Morrison

## 2021-01-25 ENCOUNTER — TELEPHONE (OUTPATIENT)
Dept: OBGYN CLINIC | Facility: HOSPITAL | Age: 71
End: 2021-01-25

## 2021-01-25 NOTE — TELEPHONE ENCOUNTER
P/O Wound Care for the right knee, Sanjuana from [facility] is with the patient and just needs to know if it should be covered or opened  Shona n/a and I transferred to Eh

## 2021-01-25 NOTE — TELEPHONE ENCOUNTER
Spoke to pt for post-op follow up assessment call   "pain is getting better but still there, 5/10"  AVS, AVS med list and F/Us reviewed with pt  Percocet taking 1 tablet before bedtime only, does make pt have intermittent nausea/ decreased appetite  taking Tylenol about 1000mg three times daily, reminded 3G/day  Reminded that there is tylenol in percocet tab, 325mg/1 tablet  Supportive  assisting at home  She confirms she is taking Asa 325mg twice daily  Didn't take one dose yesterday bc she took ibuprofen yesterday and was "worried about bleeding"  Pt advised to take aspirin 325mg twice daily as written and not skip doses, important for blood thinning, pt verbalizes understanding  Couldn't afford lovenox so is on aspirin despite allergy-- pt reports her only reaction is rash   "so far, just itchy no rash"  Has benadryl at home if needed and advised to call if becomes an issue  Confirms she is taking clinda 300mg three times daily  Pt advised to take aspirin 325mg twice daily as written and not skip doses, important for blood thinning, pt verbalizes understanding  Pt reports she is ambulating with RW, denies falls  Doing 12 steps to 2nd level each evening to go to bed  Doing home exercises  Home PT was in over weekend  RN to come out today  Home care through [address]  Gave self fleet enema to induce BM on 1/23, LBM 1/23  Denies bloody stool  Taking colace 100mg twice daily  Eating small frequent meals and drinking 6-8 glasses of water each day  "swelling is going down some"  Icing multiple times per day  Denies calf pain or redness  dressing is clean and dry, free of bleeding/drainage  The patient was instructed to paint incision with betadine three times per day  Advised per AVS You may shower after 5 days, but remember to keep the dressings dry  2/4 surgeon appt, denies barriers  Pt denies having any concerning symptoms to her  Denies CP, SOB, dizziness or calf pain  Pt encouraged to call me with any questions, concerns or issues

## 2021-01-25 NOTE — TELEPHONE ENCOUNTER
[facility] recommends Open to air  Advised that betadine swab TID to be completed   Please advise if Open to Air it okay with you?

## 2021-01-27 ENCOUNTER — PATIENT OUTREACH (OUTPATIENT)
Dept: CASE MANAGEMENT | Facility: OTHER | Age: 71
End: 2021-01-27

## 2021-01-27 NOTE — PROGRESS NOTES
Outreach TC to patient for initial care management assessment  Patient reports that she is feeling well  Patient denies any uncontrolled pain, numbness/tingling or lack of movement in the RLE  Patient reports that she was independent with ADL's prior to admission but now needs some assist with ADL's  Patient does need assist with IADLs  Patient resides with her spouse who assists as needed   Patient did not complete her BESSY appointment but has made a f/u with orthopedics for 2/4/21  Patient encouraged to make and keep all appointments  Reviewed medications including dose, schedule, purpose & side effects of ASA, clindamycin, colcae, oxycodone-acetaminophen, ferrous sulfate, folic acid, Vitamin C, multivitamin, metoprolol, calcium with vitamin D, Vitamin D3, magnesium oxide, Omega-3 fatty acids and omeprazole with patient who verbalizes understanding  Reviewed future appointments, s/sx to report and how/when to report symptoms to provider vs  911  Patient and CG verbalize understanding  Educated on s/sx of incision infection and when to notify physician  Patient educated on role of CM, contact information for CM and BPCI program  Patient  Agrees to additional calls

## 2021-01-29 DIAGNOSIS — M17.11 PRIMARY OSTEOARTHRITIS OF RIGHT KNEE: ICD-10-CM

## 2021-02-04 ENCOUNTER — APPOINTMENT (OUTPATIENT)
Dept: RADIOLOGY | Facility: MEDICAL CENTER | Age: 71
End: 2021-02-04
Payer: MEDICARE

## 2021-02-04 ENCOUNTER — OFFICE VISIT (OUTPATIENT)
Dept: OBGYN CLINIC | Facility: CLINIC | Age: 71
End: 2021-02-04

## 2021-02-04 VITALS — WEIGHT: 174 LBS | HEIGHT: 68 IN | BODY MASS INDEX: 26.37 KG/M2

## 2021-02-04 DIAGNOSIS — M17.11 PRIMARY OSTEOARTHRITIS OF RIGHT KNEE: ICD-10-CM

## 2021-02-04 DIAGNOSIS — Z96.651 S/P TOTAL KNEE REPLACEMENT, RIGHT: Primary | ICD-10-CM

## 2021-02-04 DIAGNOSIS — Z96.651 S/P TOTAL KNEE REPLACEMENT, RIGHT: ICD-10-CM

## 2021-02-04 PROCEDURE — 99024 POSTOP FOLLOW-UP VISIT: CPT | Performed by: ORTHOPAEDIC SURGERY

## 2021-02-04 PROCEDURE — 73562 X-RAY EXAM OF KNEE 3: CPT

## 2021-02-04 RX ORDER — OXYCODONE HYDROCHLORIDE AND ACETAMINOPHEN 5; 325 MG/1; MG/1
1 TABLET ORAL EVERY 4 HOURS PRN
Qty: 30 TABLET | Refills: 0 | Status: SHIPPED | OUTPATIENT
Start: 2021-02-04

## 2021-02-04 RX ORDER — ASPIRIN 325 MG
325 TABLET, DELAYED RELEASE (ENTERIC COATED) ORAL DAILY
Qty: 30 TABLET | Refills: 0 | Status: SHIPPED | OUTPATIENT
Start: 2021-02-04

## 2021-02-04 NOTE — PROGRESS NOTES
ASSESSMENT/PLAN:    Diagnoses and all orders for this visit:    S/P total knee replacement, right  -     XR knee 3 vw right non injury; Future  -     oxyCODONE-acetaminophen (PERCOCET) 5-325 mg per tablet; Take 1 tablet by mouth every 4 (four) hours as needed for moderate painMax Daily Amount: 6 tablets  -     Ambulatory referral to Physical Therapy; Future    Primary osteoarthritis of right knee  -     aspirin (ECOTRIN) 325 mg EC tablet; Take 1 tablet (325 mg total) by mouth daily        X-rays of the patient's right knee show well-seated right total knee replacement  The prosthesis is in good alignment  There are no signs of loosening  There are no fractures or dislocations  Her staples were removed and her incision is clean, dry and intact  The patient is continuing to improve since surgery  She should begin outpatient physical therapy next week for strengthening, stretching, range of motion and edema control  She was given a prescription for Percocet 30 tablets as well as aspirin 325 mg daily for DVT prophylaxis  She will follow up with our office in 1 month for strength and motion check  The patient is acceptable to this plan  Return in about 1 month (around 3/4/2021)  _____________________________________________________  CHIEF COMPLAINT:  Chief Complaint   Patient presents with    Right Knee - Post-op         SUBJECTIVE:  Jenna Powell is a 78 y o  female status post right total knee replacement from 01/20/2021  She presents to our office for a postop visit  The patient has been doing well since surgery  She still complains of pain, albeit improving every day  She is currently participating in home therapy  She is taking aspirin 325 mg twice a day for DVT prophylaxis  She denies any numbness or tingling  She denies any fever or chills      The following portions of the patient's history were reviewed and updated as appropriate: allergies, current medications, past family history, past medical history, past social history, past surgical history and problem list     PAST MEDICAL HISTORY:  Past Medical History:   Diagnosis Date    Cardiac murmur due to mitral valve disorder     GERD (gastroesophageal reflux disease)     Hyperlipidemia     Hypertension     PVC's (premature ventricular contractions)        PAST SURGICAL HISTORY:  Past Surgical History:   Procedure Laterality Date    CARPAL TUNNEL RELEASE Bilateral     CATARACT EXTRACTION Bilateral     KNEE SURGERY Right     PLANTAR'S WART EXCISION Right     MS TOTAL KNEE ARTHROPLASTY Right 1/20/2021    Procedure: KNEE TOTAL ARTHROPLASTY;  Surgeon: Martha Cee DO;  [facility] MAIN OR;  Service: Orthopedics    TONSILLECTOMY      WRIST SURGERY Bilateral        FAMILY HISTORY:  Family History   Problem Relation Age of Onset    Heart disease Mother     Hypertension Mother     Hyperlipidemia Mother     Heart disease Father     Hypertension Father     Hyperlipidemia Father     Cancer Sister        SOCIAL HISTORY:  Social History     Tobacco Use    Smoking status: Never Smoker    Smokeless tobacco: Never Used   Substance Use Topics    Alcohol use: Yes     Comment: occasional    Drug use: Never       MEDICATIONS:    Current Outpatient Medications:     aspirin (ECOTRIN) 325 mg EC tablet, Take 1 tablet (325 mg total) by mouth daily, Disp: 30 tablet, Rfl: 0    Calcium Carbonate-Vitamin D (CALCIUM 600/VITAMIN D PO), Take by mouth tues/thursday, Disp: , Rfl:     calcium carbonate-vitamin D (OSCAL-D) 500 mg-200 units per tablet, Take 1 tablet by mouth daily with breakfast, Disp: , Rfl:     Cholecalciferol (Vitamin D3) 50 MCG (2000 UT) TABS, Take 2,000 Units by mouth Monday/wednesday/friday, Disp: , Rfl:     docusate sodium (COLACE) 100 mg capsule, Take 1 capsule (100 mg total) by mouth 2 (two) times a day, Disp: 60 capsule, Rfl: 0    ferrous sulfate 324 (65 Fe) mg, TAKE 1 TABLET (324 MG TOTAL) BY MOUTH 2 (TWO) TIMES A DAY BEFORE MEALS, Disp: 60 tablet, Rfl: 1    folic acid (FOLVITE) 1 mg tablet, TAKE 1 TABLET BY MOUTH EVERY DAY, Disp: 30 tablet, Rfl: 1    magnesium oxide (MAG-OX) 400 mg, Take 400 mg by mouth daily, Disp: , Rfl:     metoprolol succinate (TOPROL-XL) 25 mg 24 hr tablet, Take 25 mg by mouth daily, Disp: , Rfl:     Omega-3 Fatty Acids (Ultra Omega-3 Fish Oil) 1400 MG CAPS, Take by mouth, Disp: , Rfl:     omeprazole (PriLOSEC) 20 mg delayed release capsule, Take 20 mg by mouth as needed, Disp: , Rfl:     ascorbic acid (VITAMIN C) 500 MG tablet, Take 1 tablet (500 mg total) by mouth 2 (two) times a day, Disp: 60 tablet, Rfl: 1    Multiple Vitamins-Minerals (multivitamin with minerals) tablet, Take 1 tablet by mouth daily, Disp: 30 tablet, Rfl: 1    oxyCODONE-acetaminophen (PERCOCET) 5-325 mg per tablet, Take 1 tablet by mouth every 4 (four) hours as needed for moderate painMax Daily Amount: 6 tablets, Disp: 30 tablet, Rfl: 0    ALLERGIES:  Allergies   Allergen Reactions    Levaquin [Levofloxacin] Hives    Aspirin Rash    Cephalosporins Rash and Other (See Comments)     rash    Dilaudid [Hydromorphone] Nausea Only     And sweaty    Keflex [Cephalexin] Rash    Lansoprazole Other (See Comments)     rash    Penicillins Rash    Tetracycline Rash       ROS:  Review of Systems     Constitutional: Negative for fatigue, fever or loss of appetite  HENT: Negative  Respiratory: Negative for shortness of breath, dyspnea  Cardiovascular: Negative for chest pain/tightness  Gastrointestinal: Negative for abdominal pain, N/V  Endocrine: Negative for cold/heat intolerance, unexplained weight loss/gain  Genitourinary: Negative for flank pain, dysuria, hematuria  Musculoskeletal:  Negative for arthralgia   Skin: Negative for rash  Neurological: Negative for numbness or tingling  Psychiatric/Behavioral: Negative for agitation    _____________________________________________________  PHYSICAL EXAMINATION:    Height 5' 8" (1 727 m), weight 78 9 kg (174 lb), not currently breastfeeding  Constitutional: Oriented to person, place, and time  Appears well-developed and well-nourished  No distress  HENT:   Head: Normocephalic  Eyes: Conjunctivae are normal  Right eye exhibits no discharge  Left eye exhibits no discharge  No scleral icterus  Cardiovascular: Normal rate  Pulmonary/Chest: Effort normal    Neurological: Alert and oriented to person, place, and time  Skin: Skin is warm and dry  No rash noted  Not diaphoretic  No erythema  No pallor  Psychiatric: Normal mood and affect  Behavior is normal  Judgment and thought content normal       MUSCULOSKELETAL EXAMINATION:   Physical Exam  Ortho Exam    Right lower extremity neurovascularly intact  Toes are pink and mobile  Compartments are soft  Incision is clean, dry and intact  Range of motion knees from 0-95 degrees  Brisk cap refill  Sensation intact  Negative Homans  Objective:  BP Readings from Last 1 Encounters:   01/22/21 159/67      Wt Readings from Last 1 Encounters:   02/04/21 78 9 kg (174 lb)        BMI:   Estimated body mass index is 26 46 kg/m² as calculated from the following:    Height as of this encounter: 5' 8" (1 727 m)  Weight as of this encounter: 78 9 kg (174 lb)  Radiographs:  _____________________________________________________  STUDIES REVIEWED:  I have personally reviewed pertinent films and reports in PACS  X-rays of the patient's right knee show well-seated right total knee replacement  The prosthesis is in good alignment  There are no signs of loosening  There are no fractures or dislocations         Padmini Haley PA-C

## 2021-02-10 ENCOUNTER — PATIENT OUTREACH (OUTPATIENT)
Dept: CASE MANAGEMENT | Facility: OTHER | Age: 71
End: 2021-02-10

## 2021-02-10 ENCOUNTER — EVALUATION (OUTPATIENT)
Dept: PHYSICAL THERAPY | Facility: CLINIC | Age: 71
End: 2021-02-10
Payer: MEDICARE

## 2021-02-10 DIAGNOSIS — Z96.651 STATUS POST TOTAL RIGHT KNEE REPLACEMENT: ICD-10-CM

## 2021-02-10 DIAGNOSIS — M17.11 PRIMARY OSTEOARTHRITIS OF RIGHT KNEE: Primary | ICD-10-CM

## 2021-02-10 PROCEDURE — 97110 THERAPEUTIC EXERCISES: CPT | Performed by: PHYSICAL THERAPIST

## 2021-02-10 PROCEDURE — 97164 PT RE-EVAL EST PLAN CARE: CPT | Performed by: PHYSICAL THERAPIST

## 2021-02-10 PROCEDURE — 97140 MANUAL THERAPY 1/> REGIONS: CPT | Performed by: PHYSICAL THERAPIST

## 2021-02-10 PROCEDURE — 97014 ELECTRIC STIMULATION THERAPY: CPT | Performed by: PHYSICAL THERAPIST

## 2021-02-10 PROCEDURE — 97010 HOT OR COLD PACKS THERAPY: CPT | Performed by: PHYSICAL THERAPIST

## 2021-02-10 NOTE — PROGRESS NOTES
Outreach TC to patient for [facility] assessment  Patient is s/p elective  R TKR   Patient has started outpatient PT today  Patient reports that she still has moderate discomfort in her R knee and continues to manage with occassional oxycodone-acetaminophen as well as plain acetaminophen  Patient is aware of and can verbalize the max dose for acetaminophen  Patient reports that incision is closed with no drainage noted  Staples have been removed  Patient is ambulatory with a SPC  Patient needs min assist with getting in/out of the shower and is independent with all other ADL's  Reviewed home medications, s/sx to report, future appointments and how/when to report symptoms to provider vs 911  Patient verbalizes understanding and agrees to additional calls

## 2021-02-10 NOTE — PROGRESS NOTES
PT Re-Evaluation     Today's date: 2/10/2021  Patient name: Jenna Powell  : 1950  MRN: [id number]  Referring provider: Fred Womack DO  Dx:   Encounter Diagnosis     ICD-10-CM    1  Primary osteoarthritis of right knee  M17 11    2  Status post total right knee replacement  [id number]                   Assessment  Assessment details: Pt is a 78year old female presenting to Outpatient PT for post-op Re-evaluation for R Total knee replacement on 2021  Pt had a 2 day hospital stay then discharged home on 2021 with home health care services  Pt had 2 weeks of home health care, now discharged from home care and referred to Outpatient PT  Pt presents with impairments consisting of increased R knee pain and swelling, decreased R knee ROM, weak quad set and decreased strength with altered gait pattern requiring use of SPC with limited knee flexion during toe off  Functional limitations include decreased weightbearing R LE, limited tolerance for prolonged standing ambulation reciprocal stair negotiation performance of ADL's and IADL's  Pt is in need of Outpatient PT to decrease pain and swelling, restore normal quadricep function, increase ROM strength balance stability proprioception and to normalize gait to return to all ADL's IADL's and functional activity unrestricted  Impairments: abnormal gait, abnormal or restricted ROM, activity intolerance, impaired physical strength, lacks appropriate home exercise program, pain with function and weight-bearing intolerance  Functional limitations: prolonged standing, walking, stair negotiation, bending, squatting, ADL's, IADL's  Symptom irritability: moderateUnderstanding of Dx/Px/POC: good   Prognosis: good    Goals  STG's once pt is post op to be met in 3-4 weeks  1  Decrease pain by 25% at worst with activity  2  Improve R knee AROM to at least 100 deg flex, -3 degrees extension  3  Improve R hip knee and ankle strength to within 10 lbs of L  4   Improve standing/walking tolerance to 30-45 minutes   5  Pt will sleep through night without waking due to pain    LTG's to be met in 12 weeks  1  Decrease pain to 2/10 at worst with activity  2  Improve ROM to at least 115 degrees flex, 0 degrees extension  3  Improve R hip knee and ankle strength to within 3-5 lbs of L  4  Improve step negotiation to reciprocal no LOB or instability  5  Improve standing and walking tolerance to at least 1 hour   6  Decrease swelling R knee by 1 cm  7  Independent with HEP  8  Improve Foto score to at least 60%    Plan  Patient would benefit from: skilled physical therapy  Planned modality interventions: unattended electrical stimulation and cryotherapy  Planned therapy interventions: joint mobilization, manual therapy, home exercise program, therapeutic exercise, stretching, strengthening, patient education, gait training, flexibility, therapeutic activities and balance  Frequency: 3x week  Duration in weeks: 4  Plan of Care beginning date: 2/10/2021  Plan of Care expiration date: 3/27/2021  Treatment plan discussed with: patient        Subjective Evaluation    History of Present Illness  Date of surgery: 2021  Mechanism of injury: surgery  Mechanism of injury: Pt reports she is seeing improvements daily  Pt reports bending R knee is still difficult  Pt reports she is able to go up and down stairs with step to pattern with less pain  Pt has been doing standing and supine HEP and stretching  Pt reports she is taking less oxycodone but still has difficulty sleeping     Quality of life: good    Pain  Current pain ratin  At best pain ratin  At worst pain ratin  Location: R knee (lateral and medial knee)  Relieving factors: ice, rest and medications  Aggravating factors: stair climbing, walking and standing      Diagnostic Tests  X-ray: abnormal    FCE comments: R knee osteoarthritisTreatments  Previous treatment: injection treatment  Current treatment: physical therapy  Patient Goals  Patient goals for therapy: decreased pain, decreased edema, increased motion, increased strength, independence with ADLs/IADLs, return to sport/leisure activities and improved balance  Patient goal: return to 2-4 mile walks        Objective     Observations     Additional Observation Details  Antalgic gait pattern with lack of R knee flex during ambulation with SPC  Pt with moderate valgus deformity R knee- valgus deformity abolished with TKR  Incision clean with scabbing no drainage and steri-strips    Neurological Testing     Sensation     Knee   Left Knee   Intact: light touch    Right Knee   Intact: light touch     Active Range of Motion   Left Knee   Flexion: 115 degrees   Extension: 0 degrees     Right Knee   Flexion: 50 degrees   Extension: -11 degrees     Additional Active Range of Motion Details  Pt demonstrating weak quad set due to increase swelling  (I) SLR with mild quad lag    Passive Range of Motion   Left Knee   Flexion: 120 degrees   Extension: 0 degrees     Right Knee   Flexion: 58 degrees   Extension: -8 degrees     Strength/Myotome Testing     Additional Strength Details                    R                  L  Hip       Flex      19 lbs          18 lbs      Abd       23 lbs          25 lbs      Add       21 lbs          23 lbs  Knee      Ext        16 lbs          25 lbs     Flex        18 lbs          22 lbs  Ankle       DF        16 lbs           21 lbs       PF         36 lbs           42 lbs    General Comments:      Knee Comments  Girth:                 R                L  suprapat         44 8 cm       Mid joint          42 5 cm    Stair negotiation is with step to pattern due to pain  Standing tolerance 30 minutes with increased pain  Walking tolerance 1 1/2 blocks with SPC limited by pain  Difficulty with donning shoes and socks requiring bending at waist  Assist with bathing/showering  Pt unable to kneel or squat  Sleep disturbed by pain nightly with sleep tolerance 3 hours  Foto- 43             Precautions: R TKR 1/20/2021      Date: 1/6/21 2/10/21      Pain: 5 5      Visit: 1 2      Manuals        PROM and jt mobilization R knee  JH      Calf and Hs stretching  JH                      Neuro Re-Ed        Standing marching        Standing hip abduction        Step ups        Mini-squats        SLS        Tandem stance                Ther Ex        Quad sets x10 3" x30 3"      Glut sets x10 3"       SLR x10 3/10      Heel slides x10 L4 band 3/10              Supine hip abd x10       SAQ's  3/10      Adductor squeezes        T-band hs curls        LAQ's x10       Seated heel slides        Nu-step        Ther Activity                        Gait Training                        Modalities        CP w/ IFC R knee  15 min

## 2021-02-10 NOTE — LETTER
February 10, 2021    Anne Toro, [address] [place] [address]    Patient: Yamini Ball   YOB: 1950   Date of Visit: 2/10/2021     Encounter Diagnosis     ICD-10-CM    1  Primary osteoarthritis of right knee  M17 11    2  Status post total right knee replacement  Z96 651        Dear Dr Senora Schlatter:    Thank you for your recent referral of Yamini Ball  Please review the attached evaluation summary from [facility] recent visit  Please verify that you agree with the plan of care by signing the attached order  If you have any questions or concerns, please do not hesitate to call  I sincerely appreciate the opportunity to share in the care of one of your patients and hope to have another opportunity to work with you in the near future  Sincerely,    Ham Wong, PT      Referring Provider:      I certify that I have read the below Plan of Care and certify the need for these services furnished under this plan of treatment while under my care  Anne Toro DO  [address] [place] [address] Dr Perez [phone]          PT Re-Evaluation     Today's date: 2/10/2021  Patient name: Yamini Ball  : 1950  MRN: [id number]  Referring provider: Giulia Alberts DO  Dx:   Encounter Diagnosis     ICD-10-CM    1  Primary osteoarthritis of right knee  M17 11    2  Status post total right knee replacement  Z96 651                   Assessment  Assessment details: Pt is a 78year old female presenting to Outpatient PT for post-op Re-evaluation for R Total knee replacement on 2021  Pt had a 2 day hospital stay then discharged home on 2021 with home health care services  Pt had 2 weeks of home health care, now discharged from home care and referred to Outpatient PT    Pt presents with impairments consisting of increased R knee pain and swelling, decreased R knee ROM, weak quad set and decreased strength with altered gait pattern requiring use of SPC with limited knee flexion during toe off  Functional limitations include decreased weightbearing R LE, limited tolerance for prolonged standing ambulation reciprocal stair negotiation performance of ADL's and IADL's  Pt is in need of Outpatient PT to decrease pain and swelling, restore normal quadricep function, increase ROM strength balance stability proprioception and to normalize gait to return to all ADL's IADL's and functional activity unrestricted  Impairments: abnormal gait, abnormal or restricted ROM, activity intolerance, impaired physical strength, lacks appropriate home exercise program, pain with function and weight-bearing intolerance  Functional limitations: prolonged standing, walking, stair negotiation, bending, squatting, ADL's, IADL's  Symptom irritability: moderateUnderstanding of Dx/Px/POC: good   Prognosis: good    Goals  STG's once pt is post op to be met in 3-4 weeks  1  Decrease pain by 25% at worst with activity  2  Improve R knee AROM to at least 100 deg flex, -3 degrees extension  3  Improve R hip knee and ankle strength to within 10 lbs of L  4  Improve standing/walking tolerance to 30-45 minutes   5  Pt will sleep through night without waking due to pain    LTG's to be met in 12 weeks  1  Decrease pain to 2/10 at worst with activity  2  Improve ROM to at least 115 degrees flex, 0 degrees extension  3  Improve R hip knee and ankle strength to within 3-5 lbs of L  4  Improve step negotiation to reciprocal no LOB or instability  5  Improve standing and walking tolerance to at least 1 hour   6  Decrease swelling R knee by 1 cm  7  Independent with HEP  8   Improve Foto score to at least 60%    Plan  Patient would benefit from: skilled physical therapy  Planned modality interventions: unattended electrical stimulation and cryotherapy  Planned therapy interventions: joint mobilization, manual therapy, home exercise program, therapeutic exercise, stretching, strengthening, patient education, gait training, flexibility, therapeutic activities and balance  Frequency: 3x week  Duration in weeks: 4  Plan of Care beginning date: 2/10/2021  Plan of Care expiration date: 3/27/2021  Treatment plan discussed with: patient        Subjective Evaluation    History of Present Illness  Date of surgery: 2021  Mechanism of injury: surgery  Mechanism of injury: Pt reports she is seeing improvements daily  Pt reports bending R knee is still difficult  Pt reports she is able to go up and down stairs with step to pattern with less pain  Pt has been doing standing and supine HEP and stretching  Pt reports she is taking less oxycodone but still has difficulty sleeping     Quality of life: good    Pain  Current pain ratin  At best pain ratin  At worst pain ratin  Location: R knee (lateral and medial knee)  Relieving factors: ice, rest and medications  Aggravating factors: stair climbing, walking and standing      Diagnostic Tests  X-ray: abnormal    FCE comments: R knee osteoarthritisTreatments  Previous treatment: injection treatment  Current treatment: physical therapy  Patient Goals  Patient goals for therapy: decreased pain, decreased edema, increased motion, increased strength, independence with ADLs/IADLs, return to sport/leisure activities and improved balance  Patient goal: return to 2-4 mile walks        Objective     Observations     Additional Observation Details  Antalgic gait pattern with lack of R knee flex during ambulation with SPC  Pt with moderate valgus deformity R knee- valgus deformity abolished with TKR  Incision clean with scabbing no drainage and steri-strips    Neurological Testing     Sensation     Knee   Left Knee   Intact: light touch    Right Knee   Intact: light touch     Active Range of Motion   Left Knee   Flexion: 115 degrees   Extension: 0 degrees     Right Knee   Flexion: 50 degrees   Extension: -11 degrees     Additional Active Range of Motion Details  Pt demonstrating weak quad set due to increase swelling  (I) SLR with mild quad lag    Passive Range of Motion   Left Knee   Flexion: 120 degrees   Extension: 0 degrees     Right Knee   Flexion: 58 degrees   Extension: -8 degrees     Strength/Myotome Testing     Additional Strength Details                    R                  L  Hip       Flex      19 lbs          18 lbs      Abd       23 lbs          25 lbs      Add       21 lbs          23 lbs  Knee      Ext        16 lbs          25 lbs     Flex        18 lbs          22 lbs  Ankle       DF        16 lbs           21 lbs       PF         36 lbs           42 lbs    General Comments:      Knee Comments  Girth:                 R                L  suprapat         44 8 cm       Mid joint          42 5 cm    Stair negotiation is with step to pattern due to pain  Standing tolerance 30 minutes with increased pain  Walking tolerance 1 1/2 blocks with SPC limited by pain  Difficulty with donning shoes and socks requiring bending at waist  Assist with bathing/showering  Pt unable to kneel or squat  Sleep disturbed by pain nightly with sleep tolerance 3 hours  Foto- 43             Precautions: R TKR 1/20/2021      Date: 1/6/21 2/10/21      Pain: 5 5      Visit: 1 2      Manuals        PROM and jt mobilization R knee  JH      Calf and Hs stretching  JH                      Neuro Re-Ed        Standing marching        Standing hip abduction        Step ups        Mini-squats        SLS        Tandem stance                Ther Ex        Quad sets x10 3" x30 3"      Glut sets x10 3"       SLR x10 3/10      Heel slides x10 L4 band 3/10              Supine hip abd x10       SAQ's  3/10      Adductor squeezes        T-band hs curls        LAQ's x10       Seated heel slides        Nu-step        Ther Activity                        Gait Training                        Modalities        CP w/ IFC R knee  15 min

## 2021-02-11 ENCOUNTER — TELEPHONE (OUTPATIENT)
Dept: OBGYN CLINIC | Facility: OTHER | Age: 71
End: 2021-02-11

## 2021-02-11 NOTE — TELEPHONE ENCOUNTER
Home Health calling requesting a Sign off on the Admission's and three discharge papers     Please look out for these and fax back    Fax back to # [phone] : Argelia Pagan

## 2021-02-12 ENCOUNTER — OFFICE VISIT (OUTPATIENT)
Dept: PHYSICAL THERAPY | Facility: CLINIC | Age: 71
End: 2021-02-12
Payer: MEDICARE

## 2021-02-12 DIAGNOSIS — M17.11 PRIMARY OSTEOARTHRITIS OF RIGHT KNEE: Primary | ICD-10-CM

## 2021-02-12 DIAGNOSIS — Z96.651 STATUS POST TOTAL RIGHT KNEE REPLACEMENT: ICD-10-CM

## 2021-02-12 PROCEDURE — 97112 NEUROMUSCULAR REEDUCATION: CPT | Performed by: PHYSICAL THERAPIST

## 2021-02-12 PROCEDURE — 97110 THERAPEUTIC EXERCISES: CPT | Performed by: PHYSICAL THERAPIST

## 2021-02-12 PROCEDURE — 97014 ELECTRIC STIMULATION THERAPY: CPT | Performed by: PHYSICAL THERAPIST

## 2021-02-12 PROCEDURE — 97140 MANUAL THERAPY 1/> REGIONS: CPT | Performed by: PHYSICAL THERAPIST

## 2021-02-12 NOTE — PROGRESS NOTES
Daily Note     Today's date: 2021  Patient name: Werner Meraz  : 1950  MRN: [id number]  Referring provider: Lulu Burden DO  Dx:   Encounter Diagnosis     ICD-10-CM    1  Primary osteoarthritis of right knee  M17 11    2  Status post total right knee replacement  Z96 651                   Subjective: Pt notes knee pain is 7/10 entering  Pt notes difficulty sleeping last night with pain 8/10  Objective: See treatment diary below  Added additional standing and seated TE per grid  Pt continues to ambulate with SPC with limp decreased weightbearing R LE and lack of flexion R knee  Assessment: Tolerated treatment well  Pt with decreased stiffness and pain post TE  Instructed pt in IT band stretching to decrease lateral knee pain and tightness Patient demonstrated fatigue post treatment  TE performed through available ROM with significant ROM restriction still present into flex and extension  Pt provided with size D tubi- to decrease swelling  Plan: Continue per plan of care        Precautions: R TKR 2021      Date: 1/6/21 2/10/21 2/12/21     Pain: 5 5 7     Visit: 1 2 3     Manuals        PROM and jt mobilization R knee  [facility]     Calf and Hs stretching  [facility]     IT band stretch                Neuro Re-Ed        Standing marching   2/10     Standing hip abduction   2/10     Step ups        Mini-squats   2/10     SLS        Tandem stance        Toe-raises   2/10     Ther Ex        Quad sets x10 3" x30 3" x30 3"     Glut sets x10 3"       SLR x10 3/10 3/10     Heel slides x10 L4 band 3/10 L4 3/10             Supine hip abd x10  3/10     SAQ's  3/10 3/10     Adductor squeezes   x30     T-band hs curls   L4 2/15     LAQ's x10  2/10     Seated heel slides   x20 5"     Nu-step   5 min L2     Ther Activity                        Gait Training                        Modalities        CP w/ IFC R knee  15 min 15 min

## 2021-02-15 ENCOUNTER — OFFICE VISIT (OUTPATIENT)
Dept: PHYSICAL THERAPY | Facility: CLINIC | Age: 71
End: 2021-02-15
Payer: MEDICARE

## 2021-02-15 DIAGNOSIS — M17.11 PRIMARY OSTEOARTHRITIS OF RIGHT KNEE: Primary | ICD-10-CM

## 2021-02-15 DIAGNOSIS — Z96.651 STATUS POST TOTAL RIGHT KNEE REPLACEMENT: ICD-10-CM

## 2021-02-15 PROCEDURE — 97140 MANUAL THERAPY 1/> REGIONS: CPT

## 2021-02-15 PROCEDURE — 97112 NEUROMUSCULAR REEDUCATION: CPT

## 2021-02-15 PROCEDURE — 97014 ELECTRIC STIMULATION THERAPY: CPT

## 2021-02-15 PROCEDURE — 97110 THERAPEUTIC EXERCISES: CPT

## 2021-02-15 NOTE — PROGRESS NOTES
Daily Note     Today's date: 2/15/2021  Patient name: Collette Majors  : 1950  MRN: [id number]  Referring provider: Saji Nicole DO  Dx:   Encounter Diagnosis     ICD-10-CM    1  Primary osteoarthritis of right knee  M17 11    2  Status post total right knee replacement  Z96 651        Start Time: 1330  Stop Time: 1430  Total time in clinic (min): 60 minutes    Subjective: Patient stated having difficulty sleeping at night, as she cannot get comfortable, with occasional spasms  She offered same c/o pain  She stated that she was able to take a shower on her own the other day without pain or difficulty  She has been able to cook and do some house cleaning, as able  Objective: PTA directed patient in TE program, with warm up on Nustep, moving the seat closer at half-way ramón, See treatment diary below  Assessment: Tolerated treatment fair  Patient had increased pain with PROM, and had spasms in R knee with flexion/extension TE; subsided after brief time  Plan: Continue per plan of care        Precautions: R TKR 2021      Date: 1/6/21 2/10/21 2/12/21 2/15    Pain: 5 5 7 5-6    Visit: 1 2 3 4    Manuals        PROM and jt mobilization R knee  [facility] KW    Calf and Hs stretching  [facility] KW    IT band stretch   [facility] KW            Neuro Re-Ed        Standing marching   2/10 2/10    Standing hip abduction   2/10 2/10    Step ups        Mini-squats   2/10 2/10    SLS        Tandem stance        Toe-raises   2/10 2/10    Ther Ex        Quad sets x10 3" x30 3" x30 3" 30x 3"    Glut sets x10 3"       SLR x10 3/10 3/10 3/10    Heel slides x10 L4 band 3/10 L4 3/10 L4 3/10            Supine hip abd x10  3/10 L3 3/10    SAQ's  3/10 3/10 3/10    Adductor squeezes   x30 30x     T-band hs curls   L4 2/15 unable    LAQ's x10  2/10 2/10    Seated heel slides   x20 5" 10x 10"     Nu-step   5 min L2 8min L2     Ther Activity                        Gait Training                        Modalities        CP w/ IFC R knee  15 min 15 min 15 min

## 2021-02-16 ENCOUNTER — OFFICE VISIT (OUTPATIENT)
Dept: PHYSICAL THERAPY | Facility: CLINIC | Age: 71
End: 2021-02-16
Payer: MEDICARE

## 2021-02-16 DIAGNOSIS — Z96.651 STATUS POST TOTAL RIGHT KNEE REPLACEMENT: ICD-10-CM

## 2021-02-16 DIAGNOSIS — M17.11 PRIMARY OSTEOARTHRITIS OF RIGHT KNEE: Primary | ICD-10-CM

## 2021-02-16 PROCEDURE — 97110 THERAPEUTIC EXERCISES: CPT | Performed by: PHYSICAL THERAPIST

## 2021-02-16 PROCEDURE — 97014 ELECTRIC STIMULATION THERAPY: CPT | Performed by: PHYSICAL THERAPIST

## 2021-02-16 PROCEDURE — 97140 MANUAL THERAPY 1/> REGIONS: CPT | Performed by: PHYSICAL THERAPIST

## 2021-02-16 PROCEDURE — 97112 NEUROMUSCULAR REEDUCATION: CPT | Performed by: PHYSICAL THERAPIST

## 2021-02-16 NOTE — PROGRESS NOTES
Daily Note     Today's date: 2021  Patient name: Marry Can  : 1950  MRN: [id number]  Referring provider: Raysa Estrada DO  Dx:   Encounter Diagnosis     ICD-10-CM    1  Primary osteoarthritis of right knee  M17 11    2  Status post total right knee replacement  Z96 651                   Subjective: Pt reports she is sore today  Pain rated 5/10 entering clinic  Objective: See treatment diary below  Foto 59  R knee AROM ext -9, PROM -5, AROM flex 60, PROM flex 66 deg      Assessment: Tolerated treatment well  Patient demonstrated fatigue post treatment  Reviewed HEP with patient and instructed pt to increase hold time with stretching  Verbal cues during TE to improve quality of movement and exercise  Pt is demonstrating improvement in ROM both flex and ext however still limited by pain and swelling  Plan: Continue per plan of care        Precautions: R TKR 2021      Date: 1/6/21 2/10/21 2/12/21 2/15 2/16   Pain: 5 5 7 5-6 5   Visit: 1 2 3 4 5   Manuals        PROM and jt mobilization R knee  [facility] CHARLES [employer] KW JH   Calf and Hs stretching  [employer] KW [employer]   IT band stretch   [employer] KW [employer]           Neuro Re-Ed        Standing marching   2/10 2/10 2/10   Standing hip abduction   2/10 2/10 2/10   Step ups        Mini-squats   2/10 2/10 2/10   SLS        Tandem stance     15" x3 ea   Toe-raises   2/10 2/10 2/10   Ther Ex        Quad sets x10 3" x30 3" x30 3" 30x 3" x30 3"           SLR x10 3/10 3/10 3/10 3/10   Heel slides x10 L4 band 3/10 L4 3/10 L4 3/10 L4 3/10           Supine hip abd x10  3/10 L3 3/10 L4 3/10   SAQ's  3/10 3/10 3/10 3/10   Adductor squeezes   x30 30x  x30 3"   T-band hs curls   L4 2/15 unable L4 2/15   LAQ's x10  2/10 2/10 3/10   Seated heel slides   x20 5" 10x 10"  10x 10"   Nu-step   5 min L2 8min L2  8 min L3   Ther Activity                        Gait Training                        Modalities        CP w/ IFC R knee  15 min 15 min 15 min 15 min

## 2021-02-16 NOTE — TELEPHONE ENCOUNTER
Vadim Pepper from [facility] is requesting signed discharged papers to be faxed to [phone]

## 2021-02-18 ENCOUNTER — TELEPHONE (OUTPATIENT)
Dept: OBGYN CLINIC | Facility: HOSPITAL | Age: 71
End: 2021-02-18

## 2021-02-18 ENCOUNTER — OFFICE VISIT (OUTPATIENT)
Dept: PHYSICAL THERAPY | Facility: CLINIC | Age: 71
End: 2021-02-18
Payer: MEDICARE

## 2021-02-18 DIAGNOSIS — M17.11 PRIMARY OSTEOARTHRITIS OF RIGHT KNEE: Primary | ICD-10-CM

## 2021-02-18 DIAGNOSIS — Z96.651 STATUS POST TOTAL RIGHT KNEE REPLACEMENT: ICD-10-CM

## 2021-02-18 PROCEDURE — 97140 MANUAL THERAPY 1/> REGIONS: CPT | Performed by: PHYSICAL THERAPIST

## 2021-02-18 PROCEDURE — 97014 ELECTRIC STIMULATION THERAPY: CPT | Performed by: PHYSICAL THERAPIST

## 2021-02-18 PROCEDURE — 97112 NEUROMUSCULAR REEDUCATION: CPT | Performed by: PHYSICAL THERAPIST

## 2021-02-18 PROCEDURE — 97110 THERAPEUTIC EXERCISES: CPT | Performed by: PHYSICAL THERAPIST

## 2021-02-18 NOTE — TELEPHONE ENCOUNTER
Patient Sees Dr Piter Lyle @ [address] [place] [address] Wilfredo called and needed paperwork faxed to her for discharge summary's

## 2021-02-18 NOTE — PROGRESS NOTES
Daily Note     Today's date: 2021  Patient name: Jessica Reese  : 1950  MRN: [id number]  Referring provider: Radha Suarez DO  Dx:   Encounter Diagnosis     ICD-10-CM    1  Primary osteoarthritis of right knee  M17 11    2  Status post total right knee replacement  Z96 651                   Subjective: Pt reports that she had a bad night last night  Objective: See treatment diary below  Assessment: Pt does not attain full TKE during standing activities  Pt required verbal cues in order to perform squats with decreased anterior tibial translation and to facilitate proximal hip strengthening  Pt is nearly one month out of surgery and has -10 ext and 50 deg of flexion  Focused on improving ROM today, not all of program was completed  Even with extensive manual work pt was unable attain full TKE and had about [address] of prone knee hangs in order to facilitate knee flexion  Instructed patient to start performing prone knee hands to improve knee extension ROM  Plan: Continue per plan of care  Precautions: R TKR 2021      Date: 2/18  2/12/21 2/15 2/16   Pain:   7 5-6 5   Visit: 6  3 4 5   Manuals        PROM and jt mobilization R knee KB + patellar mobs  JH KW JH   Calf and Hs stretching   JH KW JH   IT band stretch   JH KW JH   Desensitization  KB       Edema K tape NV?        Neuro Re-Ed        Standing marching 2/10  2/10 2/10 2/10   Standing hip abduction 2/10 L/R  2/10 2/10 2/10   Step ups        Mini-squats 2/10  2/10 2/10 2/10   SLS        Tandem stance 15" x3 ea    15" x3 ea   Toe-raises 2/10  2/10 2/10 2/10   Ther Ex        Quad sets np  x30 3" 30x 3" x30 3"           SLR   3/10 3/10 3/10   Heel slides L4 3/10  L4 3/10 L4 3/10 L4 3/10           Supine hip abd np  3/10 L3 3/10 L4 3/10   SAQ's np  3/10 3/10 3/10   Adductor squeezes np  x30 30x  x30 3"   T-band hs curls np  L4 2/15 unable L4 2/15   LAQ's 3x10  2/10 2/10 3/10   Seated heel slides 10x 10"  x20 5" 10x 10"  10x 10"   Nu-step 8 min L3  5 min L2 8min L2  8 min L3   Ther Activity                        Gait Training                        Modalities        CP w/ IFC R knee 15 min  15 min 15 min 15 min

## 2021-02-22 ENCOUNTER — OFFICE VISIT (OUTPATIENT)
Dept: PHYSICAL THERAPY | Facility: CLINIC | Age: 71
End: 2021-02-22
Payer: MEDICARE

## 2021-02-22 DIAGNOSIS — M17.11 PRIMARY OSTEOARTHRITIS OF RIGHT KNEE: Primary | ICD-10-CM

## 2021-02-22 DIAGNOSIS — Z96.651 STATUS POST TOTAL RIGHT KNEE REPLACEMENT: ICD-10-CM

## 2021-02-22 PROCEDURE — 97014 ELECTRIC STIMULATION THERAPY: CPT

## 2021-02-22 PROCEDURE — 97140 MANUAL THERAPY 1/> REGIONS: CPT

## 2021-02-22 PROCEDURE — 97112 NEUROMUSCULAR REEDUCATION: CPT

## 2021-02-22 PROCEDURE — 97110 THERAPEUTIC EXERCISES: CPT

## 2021-02-22 NOTE — PROGRESS NOTES
Daily Note     Today's date: 2021  Patient name: Yomaira Toney  : 1950  MRN: [id number]  Referring provider: Marlyn Heard DO  Dx:   Encounter Diagnosis     ICD-10-CM    1  Primary osteoarthritis of right knee  M17 11    2  Status post total right knee replacement  Z96 651        Start Time: 1400  Stop Time: 1530  Total time in clinic (min): 90 minutes    Subjective: Patient stated that in the morning she has a 6/10 until loosen up, uses ice, and takes a pain pill  She is able to stand for 45 minutes, no more  Objective: PTA directed patient in LE strengthening and ROM program, See treatment diary below  Assessment: Tolerated treatment well  Patient required verbal and tactile cueing to correct technique with squats, as R hip hiking present  Plan: Continue per plan of care  Precautions: R TKR 2021      Date: 2/18 2/22 2/12/21 2/15 2/16   Pain:   7 5-6 5   Visit: 6 7 3 4 5   Manuals        PROM and jt mobilization R knee KB + patellar mobs KW [employer] KW JH   Calf and Hs stretching  KW [employer] KW JH   IT band stretch  KW [employer] CHARLES KW [employer]   Desensitization  KB       Edema K tape NV?        Neuro Re-Ed        Standing marching 2/10 2/10 2/10 2/10 2/10   Standing hip abduction 2/10 L/R 2/10 2/10 2/10 2/10   Step ups        Mini-squats 2/10 2/10 2/10 2/10 2/10   SLS        Tandem stance 15" x3 ea 15" x3 ea   15" x3 ea   Toe-raises 2/10 2/10 2/10 2/10 2/10   Ther Ex        Quad sets np 30x 3"3 x30 3" 30x 3" x30 3"           SLR  3/10 3/10 3/10 3/10   Heel slides L4 3/10 L4  L4 3/10 L4 3/10 L4 3/10           Supine hip abd np L4 3/10 3/10 L3 3/10 L4 3/10   SAQ's np 3/10 3/10 3/10 3/10   Adductor squeezes np 30x 3" x30 30x  x30 3"   T-band hs curls np np L4 2/15 unable L4 2/15   LAQ's 3x10 3/10 2/10 2/10 3/10   Seated heel slides 10x 10" 10x 10" x20 5" 10x 10"  10x 10"   Nu-step 8 min L3 10 min L3  5 min L2 8min L2  8 min L3   Ther Activity                        Gait Training Modalities        CP w/ IFC R knee 15 min 15 min  15 min 15 min 15 min

## 2021-02-24 ENCOUNTER — OFFICE VISIT (OUTPATIENT)
Dept: PHYSICAL THERAPY | Facility: CLINIC | Age: 71
End: 2021-02-24
Payer: MEDICARE

## 2021-02-24 DIAGNOSIS — M17.11 PRIMARY OSTEOARTHRITIS OF RIGHT KNEE: Primary | ICD-10-CM

## 2021-02-24 DIAGNOSIS — Z96.651 STATUS POST TOTAL RIGHT KNEE REPLACEMENT: ICD-10-CM

## 2021-02-24 PROCEDURE — 97112 NEUROMUSCULAR REEDUCATION: CPT

## 2021-02-24 PROCEDURE — 97014 ELECTRIC STIMULATION THERAPY: CPT

## 2021-02-24 PROCEDURE — 97140 MANUAL THERAPY 1/> REGIONS: CPT

## 2021-02-24 PROCEDURE — 97110 THERAPEUTIC EXERCISES: CPT

## 2021-02-24 NOTE — PROGRESS NOTES
Daily Note     Today's date: 2021  Patient name: Corbin Lindo  : 1950  MRN: [id number]  Referring provider: Bentley Hardin DO  Dx:   Encounter Diagnosis     ICD-10-CM    1  Primary osteoarthritis of right knee  M17 11    2  Status post total right knee replacement  Z96 651        Start Time: 1300  Stop Time: 1430  Total time in clinic (min): 90 minutes    Subjective:  Pt states she continues with R knee pain especially at night  Objective: See treatment diary below  Modified SLR to include 2 hangs per 5 SLR; x4 sets  Assessment: Tolerated treatment fair  Patient demonstrated fatigue post treatment      Plan: Continue per plan of care  Precautions: R TKR 2021      Date: 2/18 2/22 2/24 2/15 2/16   Pain:   6 5-6 5   Visit: 6 7 8 4 5   Manuals        PROM and jt mobilization R knee KB + patellar mobs KW ds KW JH   Calf and Hs stretching  KW ds KW JH   IT band stretch  KW ds KW [facility] CHARLES   Desensitization  KB       Edema K tape NV?        Neuro Re-Ed        Standing marching 2/10 2/10 2/10 2/10 2/10   Standing hip abduction 2/10 L/R 2/10 2/10 2/10 2/10   Step ups        Mini-squats 2/10 2/10 2/10 2/10 2/10   SLS        Tandem stance 15" x3 ea 15" x3 ea 15' 3x  15" x3 ea   Toe-raises 2/10 2/10 30x 2/10 2/10   Ther Ex        Quad sets np 30x 3"3 30x 3" 30x 3" x30 3"   SLR  3/10 4/5 2 hangs 3/10 3/10   Heel slides L4 3/10 L4  L4 3/10 L4 3/10 L4 3/10   Supine hip abd np L4 3/10 L4 3/10 L3 3/10 L4 3/10   SAQ's np 3/10 3/10 3/10 3/10   Adductor squeezes np 30x 3" 30x 3" 30x  x30 3"   T-band hs curls np np L4 2/15 unable L4 2/15   LAQ's 3x10 3/10 2/10 2/10 3/10   Seated heel slides 10x 10" 10x 10" 10x 5" 10x 10"  10x 10"   Nu-step 8 min L3 10 min L3  10m L3 8min L2  8 min L3   Ther Activity                Gait Training                Modalities        CP w/ IFC R knee 15 min 15 min  15 m 15 min 15 min

## 2021-02-26 ENCOUNTER — OFFICE VISIT (OUTPATIENT)
Dept: PHYSICAL THERAPY | Facility: CLINIC | Age: 71
End: 2021-02-26
Payer: MEDICARE

## 2021-02-26 DIAGNOSIS — Z96.651 STATUS POST TOTAL RIGHT KNEE REPLACEMENT: ICD-10-CM

## 2021-02-26 DIAGNOSIS — M17.11 PRIMARY OSTEOARTHRITIS OF RIGHT KNEE: Primary | ICD-10-CM

## 2021-02-26 PROCEDURE — 97112 NEUROMUSCULAR REEDUCATION: CPT | Performed by: PHYSICAL THERAPIST

## 2021-02-26 PROCEDURE — 97110 THERAPEUTIC EXERCISES: CPT | Performed by: PHYSICAL THERAPIST

## 2021-02-26 PROCEDURE — 97014 ELECTRIC STIMULATION THERAPY: CPT | Performed by: PHYSICAL THERAPIST

## 2021-02-26 PROCEDURE — 97140 MANUAL THERAPY 1/> REGIONS: CPT | Performed by: PHYSICAL THERAPIST

## 2021-02-26 NOTE — PROGRESS NOTES
Daily Note     Today's date: 2021  Patient name: Tootie Sanders  : 1950  MRN: [id number]  Referring provider: Piter Corley DO  Dx:   Encounter Diagnosis     ICD-10-CM    1  Primary osteoarthritis of right knee  M17 11    2  Status post total right knee replacement  Z96 651                   Subjective: Pt notes nights are still the worst  Pt notes at rest there are times that pain is minimal  Today pain rated 6/10 entering  Objective: See treatment diary below  AROM seated flex 63 degrees supine 66 degrees, supine AROM ext -10, PROM supine flex 71 degrees      Assessment: Tolerated treatment well  Patient with increase in active and passive flex ROM since last assessment  Instructed pt in manual lymphedema drainage to reduce swelling  Pt verbalized understanding of extension stretching to be performed at home as AROM ext is -10  Pain rated 5/10 post session  Plan: Continue per plan of care  Precautions: R TKR 2021      Date:    Pain:   6 6 5   Visit: 6 7 8 9 5   Manuals        PROM and jt mobilization R knee KB + patellar mobs KW [facility]   Calf and Hs stretching  KW [facility]   IT band stretch  KW [facility]   Desensitization  KB       Edema K tape NV?        Neuro Re-Ed        Standing marching 2/10 2/10 2/10 2/10 2/10   Standing hip abduction 2/10 L/R 2/10 2/10 2/10 2/10   Step ups        Mini-squats 2/10 2/10 2/10 2/10 2/10   SLS        Tandem stance 15" x3 ea 15" x3 ea 15' 3x 15'  15" x3 ea   Toe-raises 2/10 2/10 30x 3/10 2/10   Ther Ex        Quad sets np 30x 3"3 30x 3" 30x 3" x30 3"   SLR  3/10 4/5 2 hangs 4/5 4 hangs 3/10   Heel slides L4 3/10 L4  L4 3/10 L4 3/10 L4 3/10   Supine hip abd np L4 3/10 L4 3/10 L4 3/10 L4 3/10   SAQ's np 3/10 3/10 3/10 3/10   Adductor squeezes np 30x 3" 30x 3" 30x  x30 3"   T-band hs curls np np L4 2/15 L4 2/15 L4 2/15   LAQ's 3x10 3/10 2/10 2/10 3/10   Seated heel slides 10x 10" 10x 10" 10x 5" 20x 5"  10x 10"   Nu-step 8 min L3 10 min L3  10m L3 10min L3 8 min L3   Ther Activity                Gait Training                Modalities        CP w/ IFC R knee 15 min 15 min  15 m 15 min 15 min

## 2021-03-01 ENCOUNTER — OFFICE VISIT (OUTPATIENT)
Dept: PHYSICAL THERAPY | Facility: CLINIC | Age: 71
End: 2021-03-01
Payer: MEDICARE

## 2021-03-01 DIAGNOSIS — Z96.651 STATUS POST TOTAL RIGHT KNEE REPLACEMENT: ICD-10-CM

## 2021-03-01 DIAGNOSIS — M17.11 PRIMARY OSTEOARTHRITIS OF RIGHT KNEE: Primary | ICD-10-CM

## 2021-03-01 PROCEDURE — 97112 NEUROMUSCULAR REEDUCATION: CPT

## 2021-03-01 PROCEDURE — 97014 ELECTRIC STIMULATION THERAPY: CPT

## 2021-03-01 PROCEDURE — 97140 MANUAL THERAPY 1/> REGIONS: CPT

## 2021-03-01 PROCEDURE — 97110 THERAPEUTIC EXERCISES: CPT

## 2021-03-01 NOTE — PROGRESS NOTES
Daily Note     Today's date: 3/1/2021  Patient name: Yomaira Toney  : 1950  MRN: [id number]  Referring provider: Marlyn Heard DO  Dx:   Encounter Diagnosis     ICD-10-CM    1  Primary osteoarthritis of right knee  M17 11    2  Status post total right knee replacement  Z96 [id number]        Start Time: 1400  Stop Time: 1530  Total time in clinic (min): 90 minutes    Subjective: Patient stated soreness in knee more in the evening and post standing on feet for long durations, reporting 5/10 pain at this time  Objective: PTA directed patient in TE program with warm up on Nustep, See treatment diary below  Assessment: Tolerated treatment well  Patient demonstrated fatigue post treatment and would benefit from continued PT to increase ROM in R knee  Plan: Continue per plan of care  Precautions: R TKR 2021      Date: 2/18 2/22 2/24 2/26 3/1   Pain:   6 6 5   Visit: 6 7 8 9 10   Manuals        PROM and jt mobilization R knee KB + patellar mobs KW ds  KW   Calf and Hs stretching  KW ds  KW   IT band stretch  KW [facility] CHARLES KW   Desensitization  KB       Edema K tape NV?        Neuro Re-Ed        Standing marching 210 2/10 2/10 2/10 2/10   Standing hip abduction 2/10 L/R 2/10 2/10 2/10 2/10   Step ups        Mini-squats 2/10 2/10 2/10 2/10 2/10   SLS        Tandem stance 15" x3 ea 15" x3 ea 15' 3x 15'  15" x3   Toe-raises 2/10 2/10 30x 3/10 3/10   Ther Ex        Quad sets np 30x 3"3 30x 3" 30x 3" 30x 3   SLR  3/10 4/5 2 hangs 4/5 4 hangs 4/5 4hangs   Heel slides L4 3/10 L4  L4 3/10 L4 3/10 L4 3/10   Supine hip abd np L4 3/10 L4 3/10 L4 3/10 L4 3/10   SAQ's np 3/10 3/10 3/10 3/10   Adductor squeezes np 30x 3" 30x 3" 30x  30x   T-band hs curls np np L4 2/15 L4 2/15 L4 2/15   LAQ's 3x10 3/10 2/10 2/10 2/10   Seated heel slides 10x 10" 10x 10" 10x 5" 20x 5"  20x 5"   Nu-step 8 min L3 10 min L3  10m L3 10min L3 10 min L3   Ther Activity                Gait Training                Modalities        CP w/ IFC R knee 15 min 15 min  15 m 15 min 15 min

## 2021-03-03 ENCOUNTER — EVALUATION (OUTPATIENT)
Dept: PHYSICAL THERAPY | Facility: CLINIC | Age: 71
End: 2021-03-03
Payer: MEDICARE

## 2021-03-03 DIAGNOSIS — M17.11 PRIMARY OSTEOARTHRITIS OF RIGHT KNEE: Primary | ICD-10-CM

## 2021-03-03 DIAGNOSIS — Z96.651 STATUS POST TOTAL RIGHT KNEE REPLACEMENT: ICD-10-CM

## 2021-03-03 PROCEDURE — 97140 MANUAL THERAPY 1/> REGIONS: CPT | Performed by: PHYSICAL THERAPIST

## 2021-03-03 PROCEDURE — 97110 THERAPEUTIC EXERCISES: CPT | Performed by: PHYSICAL THERAPIST

## 2021-03-03 PROCEDURE — 97014 ELECTRIC STIMULATION THERAPY: CPT | Performed by: PHYSICAL THERAPIST

## 2021-03-03 PROCEDURE — 97112 NEUROMUSCULAR REEDUCATION: CPT | Performed by: PHYSICAL THERAPIST

## 2021-03-03 NOTE — PROGRESS NOTES
PT Re-Evaluation     Today's date: 3/3/2021  Patient name: Jessica Reese  : 1950  MRN: [id number]  Referring provider: Radha Suarez DO  Dx:   Encounter Diagnosis     ICD-10-CM    1  Primary osteoarthritis of right knee  M17 11    2  Status post total right knee replacement  Z96 651                   Assessment  Assessment details: Pt is a 78year old female presenting to Outpatient PT s/p R Total knee replacement on 2021  Pt has been seen for 11 visits of Outpatient PT with treatment consisting of joint mobilization, IT band stretching and soft tissue mobilization to decrease tightness and spasm, manual stretching into flex and ext, AROM, strengthening balance and proprioception exercises with CP and IFC  Pt is making slow steady progress with therapy  Pt has demonstrated strength gains throughout R hip knee and ankle for all motions assessed except hip flexion  Pt has demonstrated improvement in gait pattern with increased knee flexion and defined heel strike  Pt however still demonstrates lack of knee flex during stance phase and toe off with decreased step length L LE  Pt has demonstrated increased knee flex and extension ROM although still limited at 73 degrees flexion and -4 extension passively  Pt has demonstrated functional improvement with household chores ADL's and IADL's  Pt most limited at this time with prolonged weightbearing and sleep tolerance  Pt is making progress toward all goals and is in need of continued PT to further improve pain swelling ROM strength gait balance and tolerance for all functional activity     Impairments: abnormal gait, abnormal or restricted ROM, activity intolerance, impaired physical strength, pain with function and weight-bearing intolerance  Functional limitations: prolonged standing, walking, stair negotiation, bending, squatting, ADL's, IADL's  Symptom irritability: lowUnderstanding of Dx/Px/POC: good   Prognosis: good    Goals  STG's once pt is post op to be met in 3-4 weeks  1  Decrease pain by 25% at worst with activity- progressing not met  2  Improve R knee AROM to at least 100 deg flex, -3 degrees extension- progressing not met  3  Improve R hip knee and ankle strength to within 10 lbs of L- met  4  Improve standing/walking tolerance to 30-45 minutes - progressing not met  5  Pt will sleep through night without waking due to pain- not met    LTG's to be met in 12 weeks  1  Decrease pain to 2/10 at worst with activity- not met  2  Improve ROM to at least 115 degrees flex, 0 degrees extension- not met  3  Improve R hip knee and ankle strength to within 3-5 lbs of L- not met  4  Improve step negotiation to reciprocal no LOB or instability- not met  5  Improve standing and walking tolerance to at least 1 hour -not met  6  Decrease swelling R knee by 1 cm- progressing not met  7  Independent with HEP- ongoing and progressing  8  Improve Foto score to at least 60%- met    Plan  Patient would benefit from: skilled physical therapy  Planned modality interventions: unattended electrical stimulation and cryotherapy  Planned therapy interventions: joint mobilization, manual therapy, home exercise program, therapeutic exercise, stretching, strengthening, patient education, gait training, flexibility, therapeutic activities and balance  Frequency: 3x week  Duration in weeks: 4  Plan of Care beginning date: 3/3/2021  Plan of Care expiration date: 4/2/2021  Treatment plan discussed with: patient        Subjective Evaluation    History of Present Illness  Date of surgery: 1/20/2021  Mechanism of injury: surgery  Mechanism of injury: Pt reports her knee is bending a little better and walking a little better  Pt notes she is able to carry things now and has increased ambulation at home  Pt reports she is trying to walk a little without cane and has returned to light household  Pt notes her biggest limitation is sleep tolerance due to increased pain at night     Quality of life: good    Pain  Current pain ratin  At best pain ratin  At worst pain ratin  Location: R knee (lateral and medial knee)  Relieving factors: ice, rest and medications  Aggravating factors: stair climbing, walking and standing    Treatments  Previous treatment: injection treatment  Current treatment: physical therapy  Patient Goals  Patient goals for therapy: decreased pain, decreased edema, increased motion, increased strength, independence with ADLs/IADLs, return to sport/leisure activities and improved balance  Patient goal: return to 2-4 mile walks- progressing        Objective     Observations     Additional Observation Details  Antalgic gait pattern with lack of R knee flex during ambulation with SPC- pt ambulating with SPC with limited R knee flexion ROM  Pt with moderate valgus deformity R knee- valgus deformity abolished with TKR  Incision clean with scabbing no drainage and steri-strips-  Incision healing with 2 scabbed areas    Neurological Testing     Sensation     Knee   Left Knee   Intact: light touch    Right Knee   Intact: light touch     Active Range of Motion   Left Knee   Flexion: 115 degrees   Extension: 0 degrees     Right Knee   Flexion: 73 degrees   Extension: -8 degrees     Additional Active Range of Motion Details  Pt demonstrating weak quad set due to increase swelling- weak quad set persists  (I) SLR with mild quad lag    Passive Range of Motion   Left Knee   Flexion: 120 degrees   Extension: 0 degrees     Right Knee   Flexion: 70 degrees   Extension: -4 degrees     Strength/Myotome Testing     Additional Strength Details                    R                  L  Hip       Flex      19 lbs          18 lbs      Abd       31 lbs          25 lbs      Add       22 lbs          23 lbs  Knee      Ext        24 lbs          25 lbs     Flex        27 lbs          22 lbs  Ankle       DF        19 lbs           21 lbs       PF         36 lbs           42 lbs    General Comments:      Knee Comments  Girth:                 R                  suprapat         43 0 cm       Mid joint          40 9 cm    Stair negotiation is with step to pattern due to pain- stair negotiation is with step to pattern  Standing tolerance 30 minutes with increased pain- Remains at 30 minutes due to pain  Walking tolerance 1 1/2 blocks with SPC limited by pain- Walking tolerance with SPC increased to 2-3 blocks  Difficulty with donning shoes and socks requiring bending at waist-improved but still has to modify technique  Assist with bathing/showering- (I) with bathing and showering  Pt unable to kneel or squat- same  Sleep disturbed by pain nightly with sleep tolerance 3 hours- sleep still disturbed nightly with sleep tolerance 3-4 hours  Foto- 43- improved to 61             Precautions: R TKR 1/20/2021    Date: 3/3 2/22 2/24 2/26 3/1   Pain: 6  6 6 5   Visit: 11 7 8 9 10   Manuals        PROM and jt mobilization R knee [employer] CHARLES KW ds  KW   Calf and Hs stretching  KW ds JH KW   IT band stretch [employer] CHARLES KW ds [employer] CHARLES KW   Desensitization         Edema K tape        Neuro Re-Ed        Standing marching 1# 2/10 2/10 2/10 2/10 2/10   Standing hip abduction 1# 2/10 L/R 2/10 2/10 2/10 2/10   Step ups        Mini-squats 2/10 2/10 2/10 2/10 2/10   SLS        Tandem stance 15" x3 ea 15" x3 ea 15' 3x 15'  15" x3   Toe-raises 3/10 2/10 30x 3/10 3/10   Ther Ex        Quad sets 30 x 3" hold 30x 3"3 30x 3" 30x 3" 30x 3   SLR  3/10 4/5 2 hangs 4/5 4 hangs 4/5 4hangs   Heel slides L4 3/10 L4  L4 3/10 L4 3/10 L4 3/10   Supine hip abd L4 3/10 L4 3/10 L4 3/10 L4 3/10 L4 3/10   SAQ's 1# 3/10 3/10 3/10 3/10 3/10   Adductor squeezes  30x 3" 30x 3" 30x  30x   T-band hs curls L4 3/10 np L4 2/15 L4 2/15 L4 2/15   LAQ's 1# 3x10 3/10 2/10 2/10 2/10   Seated heel slides 10x 10" 10x 10" 10x 5" 20x 5"  20x 5"   Nu-step 10 min L3 10 min L3  10m L3 10min L3 10 min L3   Ther Activity                Gait Training                Modalities        CP w/ IFC R knee 15 min Biphasic 15 min  15 m 15 min 15 min

## 2021-03-03 NOTE — LETTER
March 3, 2021    Cindy Moreno, [address]    Patient: Kelly Burton   YOB: 1950   Date of Visit: 3/3/2021     Encounter Diagnosis     ICD-10-CM    1  Primary osteoarthritis of right knee  M17 11    2  Status post total right knee replacement  Z96 651        Dear Dr Yaw Vigil:    Thank you for your recent referral of Kelly Burton  Please review the attached evaluation summary from [facility] [employer] recent visit  Please verify that you agree with the plan of care by signing the attached order  If you have any questions or concerns, please do not hesitate to call  I sincerely appreciate the opportunity to share in the care of one of your patients and hope to have another opportunity to work with you in the near future  Sincerely,    Kan Siddiqi, PT      Referring Provider:      I certify that I have read the below Plan of Care and certify the need for these services furnished under this plan of treatment while under my care  Cindy Moreno DO  [address] [place] [address] Dr Patricia [place]          PT Re-Evaluation     Today's date: 3/3/2021  Patient name: Kelly Burton  : 1950  MRN: [id number]  Referring provider: Giuliana Christiansen DO  Dx:   Encounter Diagnosis     ICD-10-CM    1  Primary osteoarthritis of right knee  M17 11    2  Status post total right knee replacement  Z96 [id number]                   Assessment  Assessment details: Pt is a 78year old female presenting to Outpatient PT s/p R Total knee replacement on 2021  Pt has been seen for 11 visits of Outpatient PT with treatment consisting of joint mobilization, IT band stretching and soft tissue mobilization to decrease tightness and spasm, manual stretching into flex and ext, AROM, strengthening balance and proprioception exercises with CP and IFC  Pt is making slow steady progress with therapy   Pt has demonstrated strength gains throughout R hip knee and ankle for all motions assessed except hip flexion  Pt has demonstrated improvement in gait pattern with increased knee flexion and defined heel strike  Pt however still demonstrates lack of knee flex during stance phase and toe off with decreased step length L LE  Pt has demonstrated increased knee flex and extension ROM although still limited at 73 degrees flexion and -4 extension passively  Pt has demonstrated functional improvement with household chores ADL's and IADL's  Pt most limited at this time with prolonged weightbearing and sleep tolerance  Pt is making progress toward all goals and is in need of continued PT to further improve pain swelling ROM strength gait balance and tolerance for all functional activity  Impairments: abnormal gait, abnormal or restricted ROM, activity intolerance, impaired physical strength, pain with function and weight-bearing intolerance  Functional limitations: prolonged standing, walking, stair negotiation, bending, squatting, ADL's, IADL's  Symptom irritability: lowUnderstanding of Dx/Px/POC: good   Prognosis: good    Goals  STG's once pt is post op to be met in 3-4 weeks  1  Decrease pain by 25% at worst with activity- progressing not met  2  Improve R knee AROM to at least 100 deg flex, -3 degrees extension- progressing not met  3  Improve R hip knee and ankle strength to within 10 lbs of L- met  4  Improve standing/walking tolerance to 30-45 minutes - progressing not met  5  Pt will sleep through night without waking due to pain- not met    LTG's to be met in 12 weeks  1  Decrease pain to 2/10 at worst with activity- not met  2  Improve ROM to at least 115 degrees flex, 0 degrees extension- not met  3  Improve R hip knee and ankle strength to within 3-5 lbs of L- not met  4  Improve step negotiation to reciprocal no LOB or instability- not met  5  Improve standing and walking tolerance to at least 1 hour -not met  6   Decrease swelling R knee by 1 cm- progressing not met  7  Independent with HEP- ongoing and progressing  8  Improve Foto score to at least 60%- met    Plan  Patient would benefit from: skilled physical therapy  Planned modality interventions: unattended electrical stimulation and cryotherapy  Planned therapy interventions: joint mobilization, manual therapy, home exercise program, therapeutic exercise, stretching, strengthening, patient education, gait training, flexibility, therapeutic activities and balance  Frequency: 3x week  Duration in weeks: 4  Plan of Care beginning date: 3/3/2021  Plan of Care expiration date: 2021  Treatment plan discussed with: patient        Subjective Evaluation    History of Present Illness  Date of surgery: 2021  Mechanism of injury: surgery  Mechanism of injury: Pt reports her knee is bending a little better and walking a little better  Pt notes she is able to carry things now and has increased ambulation at home  Pt reports she is trying to walk a little without cane and has returned to light household  Pt notes her biggest limitation is sleep tolerance due to increased pain at night     Quality of life: good    Pain  Current pain ratin  At best pain ratin  At worst pain ratin  Location: R knee (lateral and medial knee)  Relieving factors: ice, rest and medications  Aggravating factors: stair climbing, walking and standing    Treatments  Previous treatment: injection treatment  Current treatment: physical therapy  Patient Goals  Patient goals for therapy: decreased pain, decreased edema, increased motion, increased strength, independence with ADLs/IADLs, return to sport/leisure activities and improved balance  Patient goal: return to 2-4 mile walks- progressing        Objective     Observations     Additional Observation Details  Antalgic gait pattern with lack of R knee flex during ambulation with SPC- pt ambulating with SPC with limited R knee flexion ROM  Pt with moderate valgus deformity R knee- valgus deformity abolished with TKR  Incision clean with scabbing no drainage and steri-strips-  Incision healing with 2 scabbed areas    Neurological Testing     Sensation     Knee   Left Knee   Intact: light touch    Right Knee   Intact: light touch     Active Range of Motion   Left Knee   Flexion: 115 degrees   Extension: 0 degrees     Right Knee   Flexion: 73 degrees   Extension: -8 degrees     Additional Active Range of Motion Details  Pt demonstrating weak quad set due to increase swelling- weak quad set persists  (I) SLR with mild quad lag    Passive Range of Motion   Left Knee   Flexion: 120 degrees   Extension: 0 degrees     Right Knee   Flexion: 70 degrees   Extension: -4 degrees     Strength/Myotome Testing     Additional Strength Details                    R                  L  Hip       Flex      19 lbs          18 lbs      Abd       31 lbs          25 lbs      Add       22 lbs          23 lbs  Knee      Ext        24 lbs          25 lbs     Flex        27 lbs          22 lbs  Ankle       DF        19 lbs           21 lbs       PF         36 lbs           42 lbs    General Comments:      Knee Comments  Girth:                 R                  suprapat         43 0 cm       Mid joint          40 9 cm    Stair negotiation is with step to pattern due to pain- stair negotiation is with step to pattern  Standing tolerance 30 minutes with increased pain- Remains at 30 minutes due to pain  Walking tolerance 1 1/2 blocks with SPC limited by pain- Walking tolerance with SPC increased to 2-3 blocks  Difficulty with donning shoes and socks requiring bending at waist-improved but still has to modify technique  Assist with bathing/showering- (I) with bathing and showering  Pt unable to kneel or squat- same  Sleep disturbed by pain nightly with sleep tolerance 3 hours- sleep still disturbed nightly with sleep tolerance 3-4 hours  Foto- 43- improved to 61             Precautions: R TKR 1/20/2021    Date: 3/3 2/22 2/24 2/26 3/1   Pain: 6  6 6 5   Visit: 11 7 8 9 10   Manuals        PROM and jt mobilization R knee JH KW ds JH KW   Calf and Hs stretching JH KW ds [facility] CHARLES KW   IT band stretch [facility] CHARLES KW ds [facility] KW   Desensitization         Edema K tape        Neuro Re-Ed        Standing marching 1# 2/10 2/10 2/10 2/10 2/10   Standing hip abduction 1# 2/10 L/R 2/10 2/10 2/10 2/10   Step ups        Mini-squats 2/10 2/10 2/10 2/10 2/10   SLS        Tandem stance 15" x3 ea 15" x3 ea 15' 3x 15'  15" x3   Toe-raises 3/10 2/10 30x 3/10 3/10   Ther Ex        Quad sets 30 x 3" hold 30x 3"3 30x 3" 30x 3" 30x 3   SLR  3/10 4/5 2 hangs 4/5 4 hangs 4/5 4hangs   Heel slides L4 3/10 L4  L4 3/10 L4 3/10 L4 3/10   Supine hip abd L4 3/10 L4 3/10 L4 3/10 L4 3/10 L4 3/10   SAQ's 1# 3/10 3/10 3/10 3/10 3/10   Adductor squeezes  30x 3" 30x 3" 30x  30x   T-band hs curls L4 3/10 np L4 2/15 L4 2/15 L4 2/15   LAQ's 1# 3x10 3/10 2/10 2/10 2/10   Seated heel slides 10x 10" 10x 10" 10x 5" 20x 5"  20x 5"   Nu-step 10 min L3 10 min L3  10m L3 10min L3 10 min L3   Ther Activity                Gait Training                Modalities        CP w/ IFC R knee 15 min Biphasic 15 min  15 m 15 min 15 min

## 2021-03-04 ENCOUNTER — OFFICE VISIT (OUTPATIENT)
Dept: OBGYN CLINIC | Facility: CLINIC | Age: 71
End: 2021-03-04

## 2021-03-04 ENCOUNTER — PATIENT OUTREACH (OUTPATIENT)
Dept: CASE MANAGEMENT | Facility: OTHER | Age: 71
End: 2021-03-04

## 2021-03-04 VITALS
SYSTOLIC BLOOD PRESSURE: 142 MMHG | HEART RATE: 99 BPM | WEIGHT: 174 LBS | DIASTOLIC BLOOD PRESSURE: 81 MMHG | BODY MASS INDEX: 26.37 KG/M2 | HEIGHT: 68 IN

## 2021-03-04 DIAGNOSIS — Z96.651 S/P TOTAL KNEE REPLACEMENT, RIGHT: Primary | ICD-10-CM

## 2021-03-04 PROCEDURE — 99024 POSTOP FOLLOW-UP VISIT: CPT | Performed by: ORTHOPAEDIC SURGERY

## 2021-03-04 RX ORDER — HYDROCODONE BITARTRATE AND ACETAMINOPHEN 5; 325 MG/1; MG/1
1 TABLET ORAL EVERY 6 HOURS PRN
Qty: 30 TABLET | Refills: 0 | Status: SHIPPED | OUTPATIENT
Start: 2021-03-04 | End: 2021-03-14

## 2021-03-04 RX ORDER — NAPROXEN 500 MG/1
500 TABLET ORAL 2 TIMES DAILY WITH MEALS
Qty: 60 TABLET | Refills: 0 | Status: SHIPPED | OUTPATIENT
Start: 2021-03-04 | End: 2021-04-08 | Stop reason: SDUPTHER

## 2021-03-04 NOTE — PROGRESS NOTES
Outreach TC to patient for CM assessment  Patient is continuing with outpatient PT  Patient reports that she has moderate pain especially after PT and at night  Patient has had difficulty sleeping  Patient will see orthopedics today for advice  PT notes reveal that patient is progressing but slowly  Patient has -3 to 75 flexion passively  Patient reports that the joint remains swollen  Patient is using ice Incision is closed with no drainage and no s/sx of infection noted  Reviewed home medications, s/sx to report, future appointments and how/when to report symptoms to provider vs 911  Patient verbalizes understanding and agrees to additional calls

## 2021-03-04 NOTE — PROGRESS NOTES
ASSESSMENT/PLAN:    Diagnoses and all orders for this visit:    S/P total knee replacement, right  -     Splint  -     naproxen (EC NAPROSYN) 500 MG EC tablet; Take 1 tablet (500 mg total) by mouth 2 (two) times a day with meals  -     HYDROcodone-acetaminophen (NORCO) 5-325 mg per tablet; Take 1 tablet by mouth every 6 (six) hours as needed for pain for up to 10 daysMax Daily Amount: 4 tablets        The patient is continuing to recover from surgery  She needs to work on improved flexion  She was given a prescription for a DynaSplint  She was also given prescriptions for naproxen to help with swelling and inflammation, as well as, Norco for pain relief as needed  She will follow up with our office in 5 weeks with new x-rays of her right knee three views  She should continue physical therapy at this time  The patient is acceptable to this plan  Return in about 5 weeks (around 4/8/2021)  _____________________________________________________  CHIEF COMPLAINT:  Chief Complaint   Patient presents with    Right Knee - Follow-up, Post-op         SUBJECTIVE:  Nurys Gibson is a 78 y o  female status post right total knee replacement from 01/20/2021  The patient states she is having issues flexing her knee  She is also complaining of swelling  She has been participating in physical therapy  She denies any numbness or tingling  She denies any fever or chills      The following portions of the patient's history were reviewed and updated as appropriate: allergies, current medications, past family history, past medical history, past social history, past surgical history and problem list     PAST MEDICAL HISTORY:  Past Medical History:   Diagnosis Date    Cardiac murmur due to mitral valve disorder     GERD (gastroesophageal reflux disease)     Hyperlipidemia     Hypertension     PVC's (premature ventricular contractions)        PAST SURGICAL HISTORY:  Past Surgical History:   Procedure Laterality Date  CARPAL TUNNEL RELEASE Bilateral     CATARACT EXTRACTION Bilateral     KNEE SURGERY Right     PLANTAR'S WART EXCISION Right     DC TOTAL KNEE ARTHROPLASTY Right 1/20/2021    Procedure: KNEE TOTAL ARTHROPLASTY;  Surgeon: Sandra Davis DO;  Location: [facility] MAIN OR;  Service: Orthopedics    TONSILLECTOMY      WRIST SURGERY Bilateral        FAMILY HISTORY:  Family History   Problem Relation Age of Onset    Heart disease Mother     Hypertension Mother     Hyperlipidemia Mother     Heart disease Father     Hypertension Father     Hyperlipidemia Father     Cancer Sister        SOCIAL HISTORY:  Social History     Tobacco Use    Smoking status: Never Smoker    Smokeless tobacco: Never Used   Substance Use Topics    Alcohol use: Yes     Comment: occasional    Drug use: Never       MEDICATIONS:    Current Outpatient Medications:     ascorbic acid (VITAMIN C) 500 MG tablet, Take 1 tablet (500 mg total) by mouth 2 (two) times a day, Disp: 60 tablet, Rfl: 1    aspirin (ECOTRIN) 325 mg EC tablet, Take 1 tablet (325 mg total) by mouth daily, Disp: 30 tablet, Rfl: 0    Calcium Carbonate-Vitamin D (CALCIUM 600/VITAMIN D PO), Take by mouth tues/thursday, Disp: , Rfl:     calcium carbonate-vitamin D (OSCAL-D) 500 mg-200 units per tablet, Take 1 tablet by mouth daily with breakfast, Disp: , Rfl:     Cholecalciferol (Vitamin D3) 50 MCG (2000 UT) TABS, Take 2,000 Units by mouth Monday/wednesday/friday, Disp: , Rfl:     docusate sodium (COLACE) 100 mg capsule, Take 1 capsule (100 mg total) by mouth 2 (two) times a day, Disp: 60 capsule, Rfl: 0    folic acid (FOLVITE) 1 mg tablet, TAKE 1 TABLET BY MOUTH EVERY DAY, Disp: 30 tablet, Rfl: 1    magnesium oxide (MAG-OX) 400 mg, Take 400 mg by mouth daily, Disp: , Rfl:     metoprolol succinate (TOPROL-XL) 25 mg 24 hr tablet, Take 25 mg by mouth daily, Disp: , Rfl:     Multiple Vitamins-Minerals (multivitamin with minerals) tablet, Take 1 tablet by mouth daily, Disp: 30 tablet, Rfl: 1    Omega-3 Fatty Acids (Ultra Omega-3 Fish Oil) 1400 MG CAPS, Take by mouth, Disp: , Rfl:     omeprazole (PriLOSEC) 20 mg delayed release capsule, Take 20 mg by mouth as needed, Disp: , Rfl:     oxyCODONE-acetaminophen (PERCOCET) 5-325 mg per tablet, Take 1 tablet by mouth every 4 (four) hours as needed for moderate painMax Daily Amount: 6 tablets, Disp: 30 tablet, Rfl: 0    ferrous sulfate 324 (65 Fe) mg, TAKE 1 TABLET (324 MG TOTAL) BY MOUTH 2 (TWO) TIMES A DAY BEFORE MEALS (Patient not taking: Reported on 3/4/2021), Disp: 60 tablet, Rfl: 1    HYDROcodone-acetaminophen (NORCO) 5-325 mg per tablet, Take 1 tablet by mouth every 6 (six) hours as needed for pain for up to 10 daysMax Daily Amount: 4 tablets, Disp: 30 tablet, Rfl: 0    naproxen (EC NAPROSYN) 500 MG EC tablet, Take 1 tablet (500 mg total) by mouth 2 (two) times a day with meals, Disp: 60 tablet, Rfl: 0    ALLERGIES:  Allergies   Allergen Reactions    Levaquin [Levofloxacin] Hives    Aspirin Rash    Cephalosporins Rash and Other (See Comments)     rash    Dilaudid [Hydromorphone] Nausea Only     And sweaty    Keflex [Cephalexin] Rash    Lansoprazole Other (See Comments)     rash    Penicillins Rash    Tetracycline Rash       ROS:  Review of Systems     Constitutional: Negative for fatigue, fever or loss of appetite  HENT: Negative  Respiratory: Negative for shortness of breath, dyspnea  Cardiovascular: Negative for chest pain/tightness  Gastrointestinal: Negative for abdominal pain, N/V  Endocrine: Negative for cold/heat intolerance, unexplained weight loss/gain  Genitourinary: Negative for flank pain, dysuria, hematuria  Musculoskeletal: Positive for arthralgia   Skin: Negative for rash  Neurological: Negative for numbness or tingling  Psychiatric/Behavioral: Negative for agitation    _____________________________________________________  PHYSICAL EXAMINATION:    Blood pressure 142/81, pulse 99, height 5' 8" (1 727 m), weight 78 9 kg (174 lb), not currently breastfeeding  Constitutional: Oriented to person, place, and time  Appears well-developed and well-nourished  No distress  HENT:   Head: Normocephalic  Eyes: Conjunctivae are normal  Right eye exhibits no discharge  Left eye exhibits no discharge  No scleral icterus  Cardiovascular: Normal rate  Pulmonary/Chest: Effort normal    Neurological: Alert and oriented to person, place, and time  Skin: Skin is warm and dry  No rash noted  Not diaphoretic  No erythema  No pallor  Psychiatric: Normal mood and affect  Behavior is normal  Judgment and thought content normal       MUSCULOSKELETAL EXAMINATION:   Physical Exam  Ortho Exam    Right lower extremity is neurovascularly intact  Toes are pink and mobile  Compartments are soft  Incision is healing well  No warmth or erythema present  Range of motion knee is from 0-90 degrees  Brisk cap refill  Sensation intact  Negative Homans  Objective:  BP Readings from Last 1 Encounters:   03/04/21 142/81      Wt Readings from Last 1 Encounters:   03/04/21 78 9 kg (174 lb)        BMI:   Estimated body mass index is 26 46 kg/m² as calculated from the following:    Height as of this encounter: 5' 8" (1 727 m)  Weight as of this encounter: 78 9 kg (174 lb)        Stefania Hernández PA-C

## 2021-03-05 ENCOUNTER — OFFICE VISIT (OUTPATIENT)
Dept: PHYSICAL THERAPY | Facility: CLINIC | Age: 71
End: 2021-03-05
Payer: MEDICARE

## 2021-03-05 ENCOUNTER — TELEPHONE (OUTPATIENT)
Dept: OBGYN CLINIC | Facility: HOSPITAL | Age: 71
End: 2021-03-05

## 2021-03-05 DIAGNOSIS — M17.11 PRIMARY OSTEOARTHRITIS OF RIGHT KNEE: Primary | ICD-10-CM

## 2021-03-05 DIAGNOSIS — Z96.651 STATUS POST TOTAL RIGHT KNEE REPLACEMENT: ICD-10-CM

## 2021-03-05 PROCEDURE — 97110 THERAPEUTIC EXERCISES: CPT

## 2021-03-05 PROCEDURE — 97112 NEUROMUSCULAR REEDUCATION: CPT

## 2021-03-05 PROCEDURE — 97014 ELECTRIC STIMULATION THERAPY: CPT

## 2021-03-05 PROCEDURE — 97140 MANUAL THERAPY 1/> REGIONS: CPT

## 2021-03-05 NOTE — TELEPHONE ENCOUNTER
Spoke to patient  Advised above  Advised that she should eat with all medications  Stop naproxen if stomach upset occurs or S&S of bleeding  Verbalized understanding  Patient stated she has been using a compression stocking for swelling  She wanted to make sure Dr Doreen Dominguez is aware  Stated it is thigh high  Advised that a compression stocking should be fine and can be beneficial, remove at night  Verbalized understanding  Advised nurse would call back if Dr Doreen Dominguez has anything else to add

## 2021-03-05 NOTE — PROGRESS NOTES
Daily Note     Today's date: 3/5/2021  Patient name: Negrito Sr  : 1950  MRN: [id number]  Referring provider: Brennon Castaneda DO  Dx:   Encounter Diagnosis     ICD-10-CM    1  Primary osteoarthritis of right knee  M17 11    2  Status post total right knee replacement  Z96 651        Start Time: 1000  Stop Time: 1130  Total time in clinic (min): 90 minutes    Subjective: Patient had seen MD yesterday and was informed that she is behind with knee flexion and was advised to look into a Grazyna Splint  She stated that ambulating and daily functioning is not a problem, just bending her knee  Most pain with weight bearing and during the night  Objective: PTA directed patient in TE program, and provided information to a  for the Grazyna Splint, See treatment diary below  Assessment: Tolerated treatment fair  Patient demonstrated increased tightness with PROM knee flexion; once patients muscles relaxed , able to achieve more of a Stretch  Plan: Continue per plan of care        Precautions: R TKR 2021    Date: 3/3 3/5 2/24 2/26 3/1   Pain: 6 3 6 6 5   Visit: 11 12 8 9 10   Manuals        PROM and jt mobilization R knee JH KW ds JH KW   Calf and Hs stretching  KW ds JH KW   IT band stretch  KW3 [facility] CHARLES KW   Desensitization         Edema K tape        Neuro Re-Ed        Standing marching 1# 2/10 1# 2/10 2/10 2/10 2/10   Standing hip abduction 1# 2/10 L/R 1# 2/10 2/10 2/10 2/10   Step ups        Mini-squats 10 2/10 2/10 2/10 2/10   SLS        Tandem stance 15" x3 ea 15" x3 ea 15' 3x 15'  15" x3   Toe-raises 3/10 30x  30x 3/10 3/10   Knee flexion stretch on TM   10x 10" hold       Ther Ex        Quad sets 30 x 3" hold 30x 3" 30x 3" 30x 3" 30x 3   SLR  1# 4/5 2 hangs 4/5 2 hangs 4/5 4 hangs 4/5 4hangs   Heel slides L4 3/10 L4 3/10 L4 3/10 L4 3/10 L4 3/10   Supine hip abd L4 3/10 L4 3/10 L4 3/10 L4 3/10 L4 3/10   SAQ's 1# 3/10 1# 3/10 3/10 3/10 3/10   Adductor squeezes  30x  30x 3" 30x 30x   T-band hs curls L4 3/10 L4 3/10 L4 2/15 L4 2/15 L4 2/15   LAQ's 1# 3x10 1# 3/10 2/10 2/10 2/10   Seated heel slides 10x 10" 10x 10" 10x 5" 20x 5"  20x 5"   Nu-step 10 min L3 10m L3  10m L3 10min L3 10 min L3   Ther Activity                Gait Training                Modalities        CP w/ IFC R knee 15 min Biphasic 15 min  15 m 15 min 15 min

## 2021-03-05 NOTE — TELEPHONE ENCOUNTER
Dr Senora Schlatter  Re: Naproxen + asprin    Patient asked if she should be taking her asprin 325 with NAPROXEN 500mg daily

## 2021-03-08 ENCOUNTER — OFFICE VISIT (OUTPATIENT)
Dept: PHYSICAL THERAPY | Facility: CLINIC | Age: 71
End: 2021-03-08
Payer: MEDICARE

## 2021-03-08 DIAGNOSIS — M17.11 PRIMARY OSTEOARTHRITIS OF RIGHT KNEE: Primary | ICD-10-CM

## 2021-03-08 DIAGNOSIS — Z96.651 STATUS POST TOTAL RIGHT KNEE REPLACEMENT: ICD-10-CM

## 2021-03-08 PROCEDURE — 97140 MANUAL THERAPY 1/> REGIONS: CPT

## 2021-03-08 PROCEDURE — 97112 NEUROMUSCULAR REEDUCATION: CPT

## 2021-03-08 PROCEDURE — 97014 ELECTRIC STIMULATION THERAPY: CPT

## 2021-03-08 PROCEDURE — 97110 THERAPEUTIC EXERCISES: CPT

## 2021-03-08 NOTE — PROGRESS NOTES
Daily Note     Today's date: 3/8/2021  Patient name: Nurys Gibson  : 1950  MRN: [id number]  Referring provider: Elmira Regalado DO  Dx:   Encounter Diagnosis     ICD-10-CM    1  Primary osteoarthritis of right knee  M17 11    2  Status post total right knee replacement  Z96 651        Start Time: 1400  Stop Time: 1530  Total time in clinic (min): 90 minutes    Subjective: Pt states she has been working more at home  Objective: See treatment diary below  PTA added TM per PT OK // for gait training and hip swings  Assessment: Tolerated treatment well  Patient exhibited good technique with therapeutic exercises      Plan: Continue per plan of care  Precautions: R TKR 2021    Date: 3/3 3/5 3/8 2/26 3/1   Pain: 6 3 2 no motion  5-6 motion 6 5   Visit: 11 12 12 9 10   Manuals        PROM and jt mobilization R knee JH KW ds JH KW   Calf and Hs stretching JH KW ds  KW   IT band stretch  KW3 [facility] CHARLES KW   Desensitization         Edema K tape        Neuro Re-Ed        Standing marching 1# 2/10 1# 2/10 1# 2/10 2/10 2/10   Standing hip abduction 1# 2/10 L/R 1# 2/10 1# 2/10 2/10 2/10   Step ups        Mini-squats 2/10 2/10 2/10 2/10 2/10   SLS        Tandem stance 15" x3 ea 15" x3 ea 15' 3x 15'  15" x3   Toe-raises 3/10 30x  30x 3/10 3/10   Knee flexion stretch on TM   10x 10" hold  home     Ther Ex        Quad sets 30 x 3" hold 30x 3" 30x 3" 30x 3" 30x 3   SLR c hang  1# 4/5 2 hangs 4/5 2+ 4/5 4 hangs 4/5 4hangs   Heel slides L4 3/10 L4 3/10 L4 3/10 L4 3/10 L4 3/10   Supine hip abd L4 3/10 L4 3/10 L4 3/10 L4 3/10 L4 3/10   SAQ's 1# 3/10 1# 3/10 3/10 3/10 3/10   Adductor squeezes  30x  30x 3" 30x  30x   T-band hs curls L4 3/10 L4 3/10 L4 2/15 L4 2/15 L4 2/15   LAQ's 1# 3x10 1# 3/10 2/10 2/10 2/10   Seated heel slides 10x 10" 10x 10" 10x 5" 20x 5"  20x 5"   Nu-step 10 min L3 10m L3  10m L3 10min L3 10 min L3   Ther Activity                Gait Training        TM   1  5mph 5m     Modalities        CP w/ IFC R knee 15 min Biphasic 15 min  15 m 15 min 15 min

## 2021-03-10 ENCOUNTER — OFFICE VISIT (OUTPATIENT)
Dept: PHYSICAL THERAPY | Facility: CLINIC | Age: 71
End: 2021-03-10
Payer: MEDICARE

## 2021-03-10 DIAGNOSIS — M17.11 PRIMARY OSTEOARTHRITIS OF RIGHT KNEE: Primary | ICD-10-CM

## 2021-03-10 DIAGNOSIS — Z96.651 STATUS POST TOTAL RIGHT KNEE REPLACEMENT: ICD-10-CM

## 2021-03-10 PROCEDURE — 97014 ELECTRIC STIMULATION THERAPY: CPT

## 2021-03-10 PROCEDURE — 97110 THERAPEUTIC EXERCISES: CPT

## 2021-03-10 PROCEDURE — 97140 MANUAL THERAPY 1/> REGIONS: CPT

## 2021-03-10 PROCEDURE — 97112 NEUROMUSCULAR REEDUCATION: CPT

## 2021-03-10 NOTE — PROGRESS NOTES
Daily Note     Today's date: 3/10/2021  Patient name: Mike Parrish  : 1950  MRN: [id number]  Referring provider: Richard Barron DO  Dx:   Encounter Diagnosis     ICD-10-CM    1  Primary osteoarthritis of right knee  M17 11    2  Status post total right knee replacement  Z96 651                   Subjective: Pt comments on walking increased distance yesterday; only mild R LE  soreness noted  Objective: See treatment diary below      Assessment: Tolerated treatment well  Patient exhibited good technique with therapeutic exercises      Plan: Continue per plan of care  Precautions: R TKR 2021    Date: 3/3 3/5 3/8 3/10 3   Pain: 6 3 2 no motion  5-6 motion 2-3 5   Visit:  13 14 10   Manuals        PROM and jt mobilization R knee JH KW ds ds KW   Calf and Hs stretching JH KW ds ds KW   IT band stretch [employer] CHARLES KW3 ds ds KW   Desensitization         Edema K tape        Neuro Re-Ed        Standing marching 1# 2/10 1# 2/10 1# 2/10 2/10 2/10   Standing hip abduction 1# 2/10 L/R 1# 2/10 1# 2/10 2/10 2/10   Step ups        Mini-squats 2/10 2/10 2/10 2/10 2/10   SLS        Tandem stance 15" x3 ea 15" x3 ea 15' 3x 15'  15" x3   Toe-raises 3/10 30x  30x 3/10 3/10   Knee flexion stretch on TM   10x 10" hold  home home    Ther Ex        Quad sets 30 x 3" hold 30x 3" 30x 3" 30x 3" 30x 3   SLR c hang  1# 4/5 2 hangs 4/5 2+ 4/5 4 hangs 4/5 4hangs   Heel slides L4 3/10 L4 3/10 L4 3/10 L4 3/10 L4 3/10   Supine hip abd L4 3/10 L4 3/10 L4 3/10 L4 3/10 L4 3/10   SAQ's 1# 3/10 1# 3/10 3/10 3/10 3/10   Adductor squeezes  30x  30x 3" 30x  30x   T-band hs curls L4 3/10 L4 3/10 L4 2/15 L4 2/15 L4 2/15   LAQ's 1# 3x10 1# 3/10 2/10 2/10 2/10   Seated heel slides 10x 10" 10x 10" 10x 5" 20x 5"  20x 5"   Nu-step 10 min L3 10m L3  10m L3 10m L4 10 min L3   Ther Activity                Gait Training        TM   1  5mph 5m 2 0mph 10m    Modalities        CP w/ IFC R knee 15 min Biphasic 15 min  15 m 15 min 15 min

## 2021-03-12 ENCOUNTER — OFFICE VISIT (OUTPATIENT)
Dept: PHYSICAL THERAPY | Facility: CLINIC | Age: 71
End: 2021-03-12
Payer: MEDICARE

## 2021-03-12 DIAGNOSIS — Z96.651 STATUS POST TOTAL RIGHT KNEE REPLACEMENT: ICD-10-CM

## 2021-03-12 DIAGNOSIS — M17.11 PRIMARY OSTEOARTHRITIS OF RIGHT KNEE: Primary | ICD-10-CM

## 2021-03-12 PROCEDURE — 97014 ELECTRIC STIMULATION THERAPY: CPT | Performed by: PHYSICAL THERAPIST

## 2021-03-12 PROCEDURE — 97140 MANUAL THERAPY 1/> REGIONS: CPT | Performed by: PHYSICAL THERAPIST

## 2021-03-12 PROCEDURE — 97112 NEUROMUSCULAR REEDUCATION: CPT | Performed by: PHYSICAL THERAPIST

## 2021-03-12 PROCEDURE — 97110 THERAPEUTIC EXERCISES: CPT | Performed by: PHYSICAL THERAPIST

## 2021-03-15 ENCOUNTER — OFFICE VISIT (OUTPATIENT)
Dept: PHYSICAL THERAPY | Facility: CLINIC | Age: 71
End: 2021-03-15
Payer: MEDICARE

## 2021-03-15 DIAGNOSIS — M17.11 PRIMARY OSTEOARTHRITIS OF RIGHT KNEE: Primary | ICD-10-CM

## 2021-03-15 DIAGNOSIS — Z96.651 STATUS POST TOTAL RIGHT KNEE REPLACEMENT: ICD-10-CM

## 2021-03-15 PROCEDURE — 97110 THERAPEUTIC EXERCISES: CPT

## 2021-03-15 PROCEDURE — 97140 MANUAL THERAPY 1/> REGIONS: CPT

## 2021-03-15 PROCEDURE — 97014 ELECTRIC STIMULATION THERAPY: CPT

## 2021-03-15 PROCEDURE — 97112 NEUROMUSCULAR REEDUCATION: CPT

## 2021-03-15 NOTE — PROGRESS NOTES
Daily Note     Today's date: 3/15/2021  Patient name: Tootie Sanders  : 1950  MRN: [id number]  Referring provider: Piter Corley DO  Dx:   Encounter Diagnosis     ICD-10-CM    1  Primary osteoarthritis of right knee  M17 11    2  Status post total right knee replacement  Z96 651        Start Time: 1300  Stop Time: 1415  Total time in clinic (min): 75 minutes    Subjective: Pt comments on increased tracey to walking  She continues to struggle with her ROM; she is waiting for her Dynasplint to be setup  Objective: See treatment diary below  Trial HP x10m pre-exercise  Assessment: Tolerated treatment well  Patient exhibited good technique with therapeutic exercises      Plan: Continue per plan of care        Precautions: R TKR 2021    Date: 3/15 3 3/8 3/10 3/12   Pain: 4 3 2 no motion  5-6 motion 2-3 4   Visit: 16 12 13 14 15   Manuals        PROM and jt mobilization R knee ds KW [facility] JH   Calf and Hs stretching ds KW ds [facility]   IT band stretch ds KW3 ds [facility]   Desensitization         Edema K tape        Neuro Re-Ed        Standing marching 1 5# 2/20 1# 2/10 1# 2/10 1# 2/10 1 5# 2/10   Standing hip abduction 1 5# 2/10  1# 2/10 1# 2/10 1# 2/10 1 5# 2/10   Step ups        Mini-squats 210 2/10 2/10 2/10 2/10   SLS        Tandem stance 15" x3 ea 15" x3 ea 15' 3x 15'  15" x3   Toe-raises 2/15 30x  30x 3/10 3/10   Knee flexion stretch on TM  home 10x 10" hold  home home    Ther Ex        Quad sets 30 x 3" hold 30x 3" 30x 3" 30x 3" 30x 3   SLR c hang 1 5# 4/5 1# 4/5 2 hangs 4/5 2+ 4/5 4 hangs 1 5# 4/5 4hangs   Heel slides L5 2/15 L4 3/10 L4 3/10 L4 3/10 L5 3/10   Supine hip abd L4 2/15 L4 3/10 L4 3/10 L4 3/10 L5 3/10   SAQ's 1 5# 2/15 1# 3/10 3/10 3/10 1 5#3/10   Adductor squeezes 30x 30x  30x 3" 30x  30x   T-band hs curls L5 2/15 L4 3/10 L4 2/15 L4 2/15 L5 2/15   LAQ's 1 5# 2/15 1# 3/10 2/10 2/10 1 5# 3/10   Seated heel slides 20x 5" 10x 10" 10x 5" 20x 5"  20x 5"   Nu-step 10m L4 10m L3  10m L3 10m L4 10 min L4   Ther Activity                Gait Training        TM ------>  1  5mph 5m 2 0mph 10m ------->   Modalities        CP w/ IFC R knee 15m 15 min  15 m 15 min 15 min

## 2021-03-17 ENCOUNTER — OFFICE VISIT (OUTPATIENT)
Dept: PHYSICAL THERAPY | Facility: CLINIC | Age: 71
End: 2021-03-17
Payer: MEDICARE

## 2021-03-17 DIAGNOSIS — Z96.651 STATUS POST TOTAL RIGHT KNEE REPLACEMENT: ICD-10-CM

## 2021-03-17 DIAGNOSIS — M17.11 PRIMARY OSTEOARTHRITIS OF RIGHT KNEE: Primary | ICD-10-CM

## 2021-03-17 PROCEDURE — 97110 THERAPEUTIC EXERCISES: CPT

## 2021-03-17 PROCEDURE — 97014 ELECTRIC STIMULATION THERAPY: CPT

## 2021-03-17 PROCEDURE — 97140 MANUAL THERAPY 1/> REGIONS: CPT

## 2021-03-17 PROCEDURE — 97112 NEUROMUSCULAR REEDUCATION: CPT

## 2021-03-17 NOTE — PROGRESS NOTES
Daily Note     Today's date: 3/17/2021  Patient name: Justin El  : 1950  MRN: [id number]  Referring provider: Mariya Casillas DO  Dx:   Encounter Diagnosis     ICD-10-CM    1  Primary osteoarthritis of right knee  M17 11    2  Status post total right knee replacement  Z96 651        Start Time: 1400  Stop Time: 1515  Total time in clinic (min): 75 minutes    Subjective: Pt states she was setup with her Dynasplint yesterday; she has used it 3x yesterday and 2x today for 30m each time  She comments on slight increased stiffness  Objective: See treatment diary below      Assessment: Tolerated treatment well  Patient exhibited good technique with therapeutic exercises      Plan: Continue per plan of care        Precautions: R TKR 2021    Date: 3/15 3/17 3/8 3/10 3/12   Pain: 4 4 2 no motion  5-6 motion 2-3 4   Visit: 16 17 13 14 15   Manuals        PROM and jt mobilization R knee ds ds ds [facility]   Calf and Hs stretching ds ds ds [facility] JH   IT band stretch ds ds ds [facility]   Desensitization         Edema K tape        Neuro Re-Ed        Standing marching 1 5# 2/20 1 5# 2/10 1# 2/10 1# 2/10 1 5# 2/10   Standing hip abduction 1 5# 2/10  1 5# 2/10 1# 2/10 1# 2/10 1 5# 2/10   Step ups        Mini-squats 10 2/10 2/10 2/10 2/10   SLS        Tandem stance 15" x3 ea 15" x3 ea 15' 3x 15'  15" x3   Toe-raises 2/15 2/15 30x 3/10 3/10   Knee flexion stretch on TM  home 10x 10"  home home    Ther Ex        Quad sets 30 x 3" hold 30x 3" 30x 3" 30x 3" 30x 3   SLR c hang 1 5# 4/5 1# 4/5 2 hangs 4/5 2+ 4/5 4 hangs 1 5# 4/5 4hangs   Heel slides L5 2/15 L5 3/10 L4 3/10 L4 3/10 L5 3/10   Supine hip abd L4 2/15 L5 3/10 L4 3/10 L4 3/10 L5 3/10   SAQ's 1 5# 2/15 1 5# 3/10 3/10 3/10 1 5#3/10   Adductor squeezes 30x 30x  30x 3" 30x  30x   T-band hs curls L5 2/15 L5 3/10 L4 2/15 L4 2/15 L5 2/15   LAQ's 1 5# 2/15 1 5# 3/10 2/10 2/10 1 5# 3/10   Seated heel slides 20x 5" 20x 10" 10x 5" 20x 5"  20x 5"   Nu-step S7-S6 10m L4 10m L4 10m L3 10m L4 10 min L4   Ther Activity                Gait Training        TM ------> 2 0 x10m 1  5mph 5m 2 0mph 10m ------->   Modalities        CP w/ IFC R knee 15m 15 m 15 m 15 min 15 min

## 2021-03-19 ENCOUNTER — OFFICE VISIT (OUTPATIENT)
Dept: PHYSICAL THERAPY | Facility: CLINIC | Age: 71
End: 2021-03-19
Payer: MEDICARE

## 2021-03-19 DIAGNOSIS — M17.11 PRIMARY OSTEOARTHRITIS OF RIGHT KNEE: Primary | ICD-10-CM

## 2021-03-19 DIAGNOSIS — Z96.651 STATUS POST TOTAL RIGHT KNEE REPLACEMENT: ICD-10-CM

## 2021-03-19 PROCEDURE — 97112 NEUROMUSCULAR REEDUCATION: CPT

## 2021-03-19 PROCEDURE — 97014 ELECTRIC STIMULATION THERAPY: CPT

## 2021-03-19 PROCEDURE — 97140 MANUAL THERAPY 1/> REGIONS: CPT

## 2021-03-19 PROCEDURE — 97110 THERAPEUTIC EXERCISES: CPT

## 2021-03-19 NOTE — PROGRESS NOTES
Daily Note     Today's date: 3/19/2021  Patient name: Andrew Encarnacion  : 1950  MRN: [id number]  Referring provider: Ronny Reyez DO  Dx:   Encounter Diagnosis     ICD-10-CM    1  Primary osteoarthritis of right knee  M17 11    2  Status post total right knee replacement  Z96 651        Start Time: 1000  Stop Time: 1115  Total time in clinic (min): 75 minutes    Subjective:Pt reports using her Dynasplint 3x per day; 32m each  Objective: See treatment diary below      Assessment: Tolerated treatment well  Patient exhibited good technique with therapeutic exercises      Plan: Continue per plan of care        Precautions: R TKR 2021    Date: 3/15 3/17 3/19 3/10 3/12   Pain: 4 4 3 2-3 4   Visit:  18 14 15   Manuals        PROM and jt mobilization R knee ds ds ds  JH   Calf and Hs stretching ds ds [facility] CHARLES JH   IT band stretch ds ds ds [facility] CHARLES   Desensitization         Edema K tape        Neuro Re-Ed        Standing marching 1 5# 2/20 1 5# 2/10 2# 2/10 1# 2/10 1 5# 2/10   Standing hip abduction 1 5# 2/10  1 5# 2/10 2# 2/10 1# 2/10 1 5# 2/10   Step ups        Mini-squats 2/10 2/10 2/10 2/10 2/10   SLS        Tandem stance 15" x3 ea 15" x3 ea 15' 3x 15'  15" x3   Toe-raises 2/15 2/15 30x 3/10 3/10   Knee flexion stretch on TM  home 10x 10"  dc home    Ther Ex        Quad sets 30 x 3" hold 30x 3" 30x 3" 30x 3" 30x 3   SLR c hang(2) 1 5# 4/5 1# 4/5 2 hangs 2# 4/5 4/5 4 hangs 1 5# 4/5 4hangs   Heel slides L5 2/15 L5 3/10 L5 3/10 L4 3/10 L5 3/10   Supine hip abd L4 2/15 L5 3/10 L5 3/10 L4 3/10 L5 3/10   SAQ's 1 5# 2/15 1 5# 3/10 2# 3/10 3/10 1 5#3/10   Adductor squeezes 30x 30x  30x 3" 30x  30x   T-band hs curls L5 2/15 L5 3/10 10# U 2/15 L4 2/15 L5 2/15   LAQ's 1 5# 2/15 1 5# 3/10 2# 2/15 2/10 1 5# 3/10   Seated heel slides 20x 5" 20x 10" 10x 5" 20x 5"  20x 5"   Nu-step S7-S6 10m L4 10m L4 10m L4 10m L4 10 min L4   Ther Activity                Gait Training        TM ------> 2 0 x10m 2 0 10m 2 0mph 10m ------->   Modalities        CP w/ IFC R knee 15m 15 m 15 m 15 min 15 min

## 2021-03-22 ENCOUNTER — OFFICE VISIT (OUTPATIENT)
Dept: PHYSICAL THERAPY | Facility: CLINIC | Age: 71
End: 2021-03-22
Payer: MEDICARE

## 2021-03-22 DIAGNOSIS — M17.11 PRIMARY OSTEOARTHRITIS OF RIGHT KNEE: Primary | ICD-10-CM

## 2021-03-22 DIAGNOSIS — Z96.651 STATUS POST TOTAL RIGHT KNEE REPLACEMENT: ICD-10-CM

## 2021-03-22 PROCEDURE — 97014 ELECTRIC STIMULATION THERAPY: CPT

## 2021-03-22 PROCEDURE — 97140 MANUAL THERAPY 1/> REGIONS: CPT

## 2021-03-22 PROCEDURE — 97112 NEUROMUSCULAR REEDUCATION: CPT

## 2021-03-22 PROCEDURE — 97110 THERAPEUTIC EXERCISES: CPT

## 2021-03-22 NOTE — PROGRESS NOTES
Daily Note     Today's date: 3/22/2021  Patient name: Darshan Valderrama  : 1950  MRN: [id number]  Referring provider: Martha Levine DO  Dx:   Encounter Diagnosis     ICD-10-CM    1  Primary osteoarthritis of right knee  M17 11    2  Status post total right knee replacement  Z96 651        Start Time: 1400  Stop Time: 1515  Total time in clinic (min): 75 minutes    Subjective: Pt states she feels she is making progress  Objective: See treatment diary below      Assessment: Tolerated treatment well  Patient exhibited good technique with therapeutic exercises      Plan: Continue per plan of care        Precautions: R TKR 2021    Date: 3/15 3/17 3/19 3/22 3/12   Pain: 4 4 3 4 4   Visit: 16  15   Manuals        PROM and jt mobilization R knee ds ds ds ds JH   Calf and Hs stretching ds ds ds ds JH   IT band stretch ds ds ds ds [facility] CHARLES   Desensitization         Edema K tape        Neuro Re-Ed        Standing marching 1 5# 2/20 1 5# 2/10 2# 2/10 2# 2/10 1 5# 2/10   Standing hip abduction 1 5# 2/10  1 5# 2/10 2# 2/10 2# 2/10 1 5# 2/10   Step ups        Mini-squats 2/10 2/10 2/10 2/10 2/10   SLS        Tandem stance 15" x3 ea 15" x3 ea 15' 3x 15'  15" x3   Toe-raises 2/15 215 30x 30x 3/10   Ther Ex        Quad sets 30 x 3" hold 30x 3" 30x 3" 30x 3" 30x 3   SLR c hang(2) 1 5# 4/5 1# 4/5 2 hangs 2# 4/5 2# 4/5 1 5# 4/5 4hangs   Heel slides L5 2/15 L5 3/10 L5 3/10 L5 3/10 L5 3/10   Supine hip abd L4 2/15 L5 3/10 L5 3/10 L53/10 L5 3/10   SAQ's 1 5# 2/15 1 5# 3/10 2# 3/10 2# 3/10 1 5#3/10   Adductor squeezes 30x 30x  30x 3" 30x  30x   T-band hs curls L5 2/15 L5 3/10 10# U 2/15 10#LF  15 L5 215   LAQ's 1 5# 2/15 1 5# 3/10 2# 2/15 2# 2/10 1 5# 3/10   Seated heel slides 20x 5" 20x 10" 10x 5" 20x10"  20x 5"   Nu-step S7-S6 10m L4 10m L4 10m L4 10m L4 10 min L4   Ther Activity                Gait Training        TM ------> 2 0 x10m 2 0 10m 2 0 10m ------->   Modalities        CP w/ IFC R knee 15m 15 m 15 m 15 m 15 min

## 2021-03-23 ENCOUNTER — TELEPHONE (OUTPATIENT)
Dept: OBGYN CLINIC | Facility: HOSPITAL | Age: 71
End: 2021-03-23

## 2021-03-23 NOTE — TELEPHONE ENCOUNTER
Patient sees Dr Kathleen Iniguez Signs from [facility] called requesting the most recent OVN and surgical notes  Please fax to # [phone]

## 2021-03-24 ENCOUNTER — PATIENT OUTREACH (OUTPATIENT)
Dept: CASE MANAGEMENT | Facility: OTHER | Age: 71
End: 2021-03-24

## 2021-03-24 ENCOUNTER — OFFICE VISIT (OUTPATIENT)
Dept: PHYSICAL THERAPY | Facility: CLINIC | Age: 71
End: 2021-03-24
Payer: MEDICARE

## 2021-03-24 DIAGNOSIS — Z96.651 STATUS POST TOTAL RIGHT KNEE REPLACEMENT: ICD-10-CM

## 2021-03-24 DIAGNOSIS — M17.11 PRIMARY OSTEOARTHRITIS OF RIGHT KNEE: Primary | ICD-10-CM

## 2021-03-24 PROCEDURE — 97110 THERAPEUTIC EXERCISES: CPT

## 2021-03-24 PROCEDURE — 97014 ELECTRIC STIMULATION THERAPY: CPT

## 2021-03-24 PROCEDURE — 97112 NEUROMUSCULAR REEDUCATION: CPT

## 2021-03-24 PROCEDURE — 97140 MANUAL THERAPY 1/> REGIONS: CPT

## 2021-03-24 NOTE — PROGRESS NOTES
Daily Note     Today's date: 3/24/2021  Patient name: Karan Ward  : 1950  MRN: [id number]  Referring provider: Karrie Smith DO  Dx:   Encounter Diagnosis     ICD-10-CM    1  Primary osteoarthritis of right knee  M17 11    2  Status post total right knee replacement  Z96 651        Start Time: 1400  Stop Time: 1515  Total time in clinic (min): 75 minutes    Subjective:  Pt comments on slight increased stiffness with her R knee  She is using her Dynasplint 3x daily, 30' ea time  Pt questions when she can use her Elliptical at home  Objective: See treatment diary below  Trial Bike x10m with rocking to increase flexibility  Trial Elliptical today  She will try at home  Assessment: Tolerated treatment well  Patient exhibited good technique with therapeutic exercises      Plan: Continue per plan of care        Precautions: R TKR 2021    Date: 3/15 3/17 3/19 3/22 3/24   Pain: 4 4 3 4 4   Visit:  20   Manuals        PROM and jt mobilization R knee ds ds ds ds ds   Calf and Hs stretching ds ds ds ds ds   IT band stretch ds ds ds ds ds   Desensitization         Edema K tape        Neuro Re-Ed        Standing marching 1 5# 2/20 1 5# 2/10 2# 2/10 2# 2/10 2# 2/10   Standing hip abduction 1 5# 2/10  1 5# 2/10 2# 2/10 2# 2/10 2# 2/10   Step ups        Mini-squats 2/10 2/10 2/10 2/10 2/10   SLS        Tandem stance 15" x3 ea 15" x3 ea 15' 3x 15'  15" x3   Toe-raises 2/15 2/15 30x 30x 3/10   Ther Ex        Quad sets 30 x 3" hold 30x 3" 30x 3" 30x 3" 30x 3   SLR c hang(2) 1 5# 4/5 1# 4/5 2 hangs 2# 4/5 2# 4/5 2# 4/5   Heel slides L5 2/15 L5 3/10 L5 3/10 L5 3/10 L5 3/10   Supine hip abd L4 2/15 L5 3/10 L5 3/10 L5 3/10 L5 3/10   SAQ's 1 5# 2/15 1 5# 3/10 2# 3/10 2# 3/10 2# 3/10   Adductor squeezes 30x 30x  30x 3" 30x  30x   Hamstring Curl L5 2/15 L5 3/10 10# U 2/15 10#LF  2/15 10#  2/15   LAQ's 1 5# 2/15 1 5# 3/10 2# 2/15 2# 2/10 2# 3/10   Seated heel slides 20x 5" 20x 10" 10x 5" 20x10"  20x 10" Nu-step S7-S6 10m L4 10m L4 10m L4 10m L4 10 m L4   Ther Activity                Gait Training        TM ------> 2 0 x10m 2 0 10m 2 0 10m ------->   Modalities        CP w/ IFC R knee 15m 15 m 15 m 15 m 15 min

## 2021-03-24 NOTE — PROGRESS NOTES
Outreach TC to patient for CM assessment  No answer to call  Left message on voicemail requesting a return call from patient to Erin Mcneal 12, BSN; Outpatient Care Manager @ [phone]  First unanswered call to patient

## 2021-03-26 ENCOUNTER — EVALUATION (OUTPATIENT)
Dept: PHYSICAL THERAPY | Facility: CLINIC | Age: 71
End: 2021-03-26
Payer: MEDICARE

## 2021-03-26 DIAGNOSIS — Z96.651 STATUS POST TOTAL RIGHT KNEE REPLACEMENT: ICD-10-CM

## 2021-03-26 DIAGNOSIS — M17.11 PRIMARY OSTEOARTHRITIS OF RIGHT KNEE: Primary | ICD-10-CM

## 2021-03-26 PROCEDURE — 97110 THERAPEUTIC EXERCISES: CPT | Performed by: PHYSICAL THERAPIST

## 2021-03-26 PROCEDURE — 97112 NEUROMUSCULAR REEDUCATION: CPT | Performed by: PHYSICAL THERAPIST

## 2021-03-26 PROCEDURE — 97140 MANUAL THERAPY 1/> REGIONS: CPT | Performed by: PHYSICAL THERAPIST

## 2021-03-26 PROCEDURE — 97014 ELECTRIC STIMULATION THERAPY: CPT | Performed by: PHYSICAL THERAPIST

## 2021-03-26 NOTE — PROGRESS NOTES
PT Re-Evaluation     Today's date: 3/26/2021  Patient name: Garrick Jerome  : 1950  MRN: [id number]  Referring provider: Cindy Sherman DO  Dx:   Encounter Diagnosis     ICD-10-CM    1  Primary osteoarthritis of right knee  M17 11    2  Status post total right knee replacement  Z96 651                   Assessment  Assessment details: Pt is a 78year old female presenting to Outpatient PT s/p R Total knee replacement on 2021  Pt has been seen for 21 visits of Outpatient PT with treatment consisting of joint mobilization, IT band stretching and soft tissue mobilization to decrease tightness and spasm, manual stretching into flex and ext, AROM, strengthening balance and proprioception exercises with CP and IFC  Pt continues to make slow progress with ROM with 5 degree gain into flexion and extension still limited at -4 and -8 degrees ext AROM/PROM  Knee joint stiffness continues to persist with visible swelling still present  Pt with decrease in swelling at superior pole by  7 cm  Pt is however demonstrating improved gait pattern now ambulating with no A  D  with increased weightbearing and ambulation tolerance  Pt demonstrating strength gains at R hip knee and ankle and has been able to increase activity at home with household chores  Pt's major limitation at this time is ROM  Pt has Grazyna splint at home that she has been using multiple times a day at home  Pt is in need of continued activity in PT to continue to address swelling pain ROM strength balance stability endurance and gait with goal of return to PLOF  Impairments: abnormal gait, abnormal or restricted ROM, activity intolerance, impaired physical strength and pain with function  Functional limitations: prolonged standing, walking, stair negotiation, bending, squatting, ADL's, IADL's  Symptom irritability: lowUnderstanding of Dx/Px/POC: good   Prognosis: good    Goals  STG's once pt is post op to be met in 3-4 weeks  1   Decrease pain by 25% at worst with activity- progressing not met  2  Improve R knee AROM to at least 100 deg flex, -3 degrees extension- progressing not met  3  Improve R hip knee and ankle strength to within 10 lbs of L- met  4  Improve standing/walking tolerance to 30-45 minutes - met  5  Pt will sleep through night without waking due to pain- not met    LTG's to be met in 12 weeks  1  Decrease pain to 2/10 at worst with activity- not met  2  Improve ROM to at least 115 degrees flex, 0 degrees extension- not met  3  Improve R hip knee and ankle strength to within 3-5 lbs of L- not met  4  Improve step negotiation to reciprocal no LOB or instability- not met  5  Improve standing and walking tolerance to at least 1 hour -not met  6  Decrease swelling R knee by 1 cm- progressing not met  7  Independent with HEP- ongoing and progressing  8  Improve Foto score to at least 60%- met    Plan  Patient would benefit from: skilled physical therapy  Planned modality interventions: unattended electrical stimulation and cryotherapy  Planned therapy interventions: joint mobilization, manual therapy, home exercise program, therapeutic exercise, stretching, strengthening, patient education, gait training, flexibility, therapeutic activities and balance  Frequency: 3x week  Duration in weeks: 4  Plan of Care beginning date: 3/26/2021  Plan of Care expiration date: 5/10/2021  Treatment plan discussed with: patient        Subjective Evaluation    History of Present Illness  Date of surgery: 2021  Mechanism of injury: surgery  Mechanism of injury: Pt reports she is using dynamic flex brace for flexion  Pt notes she has been able to sit in chair with leg hanging down but with less burning  Pt also feels her knee is bending better      Quality of life: good    Pain  Current pain ratin  At best pain ratin  At worst pain ratin  Location: R knee (lateral and medial knee)  Relieving factors: ice, rest and medications  Aggravating factors: stair climbing, walking and standing    Treatments  Previous treatment: injection treatment  Current treatment: physical therapy  Patient Goals  Patient goals for therapy: decreased pain, decreased edema, increased motion, increased strength, independence with ADLs/IADLs, return to sport/leisure activities and improved balance  Patient goal: return to 2-4 mile walks- progressing        Objective     Observations     Additional Observation Details  Antalgic gait pattern with lack of R knee flex during ambulation with SPC- pt ambulating without A  D  without circumduction but demonstrates decreased flex R knee  Incision clean with scabbing no drainage and steri-strips-  Healing incision with no signs of infection    Neurological Testing     Sensation     Knee   Left Knee   Intact: light touch    Right Knee   Intact: light touch     Active Range of Motion   Left Knee   Flexion: 115 degrees   Extension: 0 degrees     Right Knee   Flexion: 75 degrees   Extension: -8 degrees     Additional Active Range of Motion Details  Pt demonstrating weak quad set due to increase swelling- weak quad set persists  (I) SLR with mild quad lag    Passive Range of Motion   Left Knee   Flexion: 120 degrees   Extension: 0 degrees     Right Knee   Flexion: 78 degrees   Extension: -4 degrees     Strength/Myotome Testing     Additional Strength Details                    R                  L  Hip       Flex      20 lbs          18 lbs      Abd       37 lbs          25 lbs      Add       22 lbs          23 lbs  Knee      Ext        27 lbs          25 lbs     Flex        29 lbs          22 lbs  Ankle       DF        22 lbs           21 lbs       PF        50 lbs           42 lbs    General Comments:      Knee Comments  Girth:                 R                  suprapat         42 3 cm       Mid joint          40 9 cm    Stair negotiation is with step to pattern due to pain- stair negotiation is still with step to pattern  Standing tolerance 30 minutes with increased pain- Remains at 45 minutes due to increased soreness  Walking tolerance 1 1/2 blocks with SPC limited by pain- Walking tolerance no A D   1/4 mile  Difficulty with donning shoes and socks requiring bending at waist-improved but still has to modify technique  Assist with bathing/showering- (I) with bathing and showering  Pt unable to kneel or squat- same  Sleep disturbed by pain nightly with sleep tolerance 3 hours- sleep still disturbed nightly with sleep tolerance 3-4 hours  Foto- 43- improved to 69             Precautions: R TKR 1/20/2021  Date: 3/26 3/17 3/19 3/22 3/24   Pain: 4 4 3 4 4   Visit: 21 17 18 19 20   Manuals        PROM and jt mobilization R knee [facility] ds ds ds ds   Calf and Hs stretching J[facility] ds ds ds ds   IT band stretch J[facility] ds ds ds ds   Desensitization         Edema K tape        Neuro Re-Ed        Standing marching 2# 2/20 1 5# 2/10 2# 2/10 2# 2/10 2# 2/10   Standing hip abduction 2# 2/10  1 5# 2/10 2# 2/10 2# 2/10 2# 2/10   Step ups ------>       Mini-squats 2/10 2/10 2/10 2/10 2/10   SLS 15" x3       Tandem stance 15" x3 ea 15" x3 ea 15' 3x 15'  15" x3   Toe-raises 2/15 2/15 30x 30x 3/10   Ther Ex        Quad sets 30 x 3" hold 30x 3" 30x 3" 30x 3" 30x 3   SLR c hang(2) 2# 4/5 1# 4/5 2 hangs 2# 4/5 2# 4/5 2# 4/5   Heel slides L5 2/15 L5 3/10 L5 3/10 L5 3/10 L5 3/10   Supine hip abd L5 2/15 L5 3/10 L5 3/10 L5 3/10 L5 3/10   SAQ's 2# 2/15 1 5# 3/10 2# 3/10 2# 3/10 2# 3/10   Adductor squeezes 30x 30x  30x 3" 30x  30x   [employer] 10# 2/15 L5 3/10 10# U 2/15 10#LF  2/15 10#  2/15   LAQ's 2# 2/15 1 5# 3/10 2# 2/15 2# 2/10 2# 3/10   Seated heel slides 20x 10" 20x 10" 10x 5" 20x10"  20x 10"   Nu-step S7-S6 Ellipt L1 10 m 10m L4 10m L4 10m L4 10 m L4   Ther Activity                Gait Training        TM ------> 2 0 x10m 2 0 10m 2 0 10m ------->   Modalities        CP w/ IFC R knee 15m 15 m 15 m 15 m 15 min

## 2021-03-26 NOTE — LETTER
2021    Mayank Lanza, [address]    Patient: Alejandro English   YOB: 1950   Date of Visit: 3/26/2021     Encounter Diagnosis     ICD-10-CM    1  Primary osteoarthritis of right knee  M17 11    2  Status post total right knee replacement  Z96 651        Dear Dr Gay Nim:    Thank you for your recent referral of Alejandro English  Please review the attached evaluation summary from [facility] recent visit  Please verify that you agree with the plan of care by signing the attached order  If you have any questions or concerns, please do not hesitate to call  I sincerely appreciate the opportunity to share in the care of one of your patients and hope to have another opportunity to work with you in the near future  Sincerely,    Sheila Salamanca, PT      Referring Provider:      I certify that I have read the below Plan of Care and certify the need for these services furnished under this plan of treatment while under my care  Mayank Lanza DO  [address] [place] [address] [phone]          PT Re-Evaluation     Today's date: 3/26/2021  Patient name: Aeljandro Egnlish  : 1950  MRN: [id number]  Referring provider: Shamar Alfonso DO  Dx:   Encounter Diagnosis     ICD-10-CM    1  Primary osteoarthritis of right knee  M17 11    2  Status post total right knee replacement  Z96 651                   Assessment  Assessment details: Pt is a 78year old female presenting to Outpatient PT s/p R Total knee replacement on 2021  Pt has been seen for 21 visits of Outpatient PT with treatment consisting of joint mobilization, IT band stretching and soft tissue mobilization to decrease tightness and spasm, manual stretching into flex and ext, AROM, strengthening balance and proprioception exercises with CP and IFC   Pt continues to make slow progress with ROM with 5 degree gain into flexion and extension still limited at -4 and -8 degrees ext AROM/PROM  Knee joint stiffness continues to persist with visible swelling still present  Pt with decrease in swelling at superior pole by  7 cm  Pt is however demonstrating improved gait pattern now ambulating with no A  D  with increased weightbearing and ambulation tolerance  Pt demonstrating strength gains at R hip knee and ankle and has been able to increase activity at home with household chores  Pt's major limitation at this time is ROM  Pt has Grazyna splint at home that she has been using multiple times a day at home  Pt is in need of continued activity in PT to continue to address swelling pain ROM strength balance stability endurance and gait with goal of return to PLOF  Impairments: abnormal gait, abnormal or restricted ROM, activity intolerance, impaired physical strength and pain with function  Functional limitations: prolonged standing, walking, stair negotiation, bending, squatting, ADL's, IADL's  Symptom irritability: lowUnderstanding of Dx/Px/POC: good   Prognosis: good    Goals  STG's once pt is post op to be met in 3-4 weeks  1  Decrease pain by 25% at worst with activity- progressing not met  2  Improve R knee AROM to at least 100 deg flex, -3 degrees extension- progressing not met  3  Improve R hip knee and ankle strength to within 10 lbs of L- met  4  Improve standing/walking tolerance to 30-45 minutes - met  5  Pt will sleep through night without waking due to pain- not met    LTG's to be met in 12 weeks  1  Decrease pain to 2/10 at worst with activity- not met  2  Improve ROM to at least 115 degrees flex, 0 degrees extension- not met  3  Improve R hip knee and ankle strength to within 3-5 lbs of L- not met  4  Improve step negotiation to reciprocal no LOB or instability- not met  5  Improve standing and walking tolerance to at least 1 hour -not met  6  Decrease swelling R knee by 1 cm- progressing not met  7  Independent with HEP- ongoing and progressing  8  Improve Foto score to at least 60%- met    Plan  Patient would benefit from: skilled physical therapy  Planned modality interventions: unattended electrical stimulation and cryotherapy  Planned therapy interventions: joint mobilization, manual therapy, home exercise program, therapeutic exercise, stretching, strengthening, patient education, gait training, flexibility, therapeutic activities and balance  Frequency: 3x week  Duration in weeks: 4  Plan of Care beginning date: 3/26/2021  Plan of Care expiration date: 5/10/2021  Treatment plan discussed with: patient        Subjective Evaluation    History of Present Illness  Date of surgery: 2021  Mechanism of injury: surgery  Mechanism of injury: Pt reports she is using dynamic flex brace for flexion  Pt notes she has been able to sit in chair with leg hanging down but with less burning  Pt also feels her knee is bending better  Quality of life: good    Pain  Current pain ratin  At best pain ratin  At worst pain ratin  Location: R knee (lateral and medial knee)  Relieving factors: ice, rest and medications  Aggravating factors: stair climbing, walking and standing    Treatments  Previous treatment: injection treatment  Current treatment: physical therapy  Patient Goals  Patient goals for therapy: decreased pain, decreased edema, increased motion, increased strength, independence with ADLs/IADLs, return to sport/leisure activities and improved balance  Patient goal: return to 2-4 mile walks- progressing        Objective     Observations     Additional Observation Details  Antalgic gait pattern with lack of R knee flex during ambulation with SPC- pt ambulating without A  D  without circumduction but demonstrates decreased flex R knee  Incision clean with scabbing no drainage and steri-strips-  Healing incision with no signs of infection    Neurological Testing     Sensation     Knee   Left Knee   Intact: light touch    Right Knee   Intact: light touch Active Range of Motion   Left Knee   Flexion: 115 degrees   Extension: 0 degrees     Right Knee   Flexion: 75 degrees   Extension: -8 degrees     Additional Active Range of Motion Details  Pt demonstrating weak quad set due to increase swelling- weak quad set persists  (I) SLR with mild quad lag    Passive Range of Motion   Left Knee   Flexion: 120 degrees   Extension: 0 degrees     Right Knee   Flexion: 78 degrees   Extension: -4 degrees     Strength/Myotome Testing     Additional Strength Details                    R                  L  Hip       Flex      20 lbs          18 lbs      Abd       37 lbs          25 lbs      Add       22 lbs          23 lbs  Knee      Ext        27 lbs          25 lbs     Flex        29 lbs          22 lbs  Ankle       DF        22 lbs           21 lbs       PF        50 lbs           42 lbs    General Comments:      Knee Comments  Girth:                 R                  suprapat         42 3 cm       Mid joint          40 9 cm    Stair negotiation is with step to pattern due to pain- stair negotiation is still with step to pattern  Standing tolerance 30 minutes with increased pain- Remains at 45 minutes due to increased soreness  Walking tolerance 1 1/2 blocks with SPC limited by pain- Walking tolerance no A D   1/4 mile  Difficulty with donning shoes and socks requiring bending at waist-improved but still has to modify technique  Assist with bathing/showering- (I) with bathing and showering  Pt unable to kneel or squat- same  Sleep disturbed by pain nightly with sleep tolerance 3 hours- sleep still disturbed nightly with sleep tolerance 3-4 hours  Foto- 43- improved to 69             Precautions: R TKR 1/20/2021  Date: 3/26 3/17 3/19 3/22 3/24   Pain: 4 4 3 4 4   Visit: 21 17 18 19 20   Manuals        PROM and jt mobilization R knee  ds ds ds ds   Calf and Hs stretching  ds ds ds ds   IT band stretch  ds ds ds ds   Desensitization         Edema K tape        Neuro Re-Ed Standing marching 2# 2/20 1 5# 2/10 2# 2/10 2# 2/10 2# 2/10   Standing hip abduction 2# 2/10  1 5# 2/10 2# 2/10 2# 2/10 2# 2/10   Step ups ------>       Mini-squats 2/10 2/10 2/10 2/10 2/10   SLS 15" x3       Tandem stance 15" x3 ea 15" x3 ea 15' 3x 15'  15" x3   Toe-raises 2/15 2/15 30x 30x 3/10   Ther Ex        Quad sets 30 x 3" hold 30x 3" 30x 3" 30x 3" 30x 3   SLR c hang(2) 2# 4/5 1# 4/5 2 hangs 2# 4/5 2# 4/5 2# 4/5   Heel slides L5 2/15 L5 3/10 L5 3/10 L5 3/10 L5 3/10   Supine hip abd L5 2/15 L5 3/10 L5 3/10 L5 3/10 L5 3/10   SAQ's 2# 2/15 1 5# 3/10 2# 3/10 2# 3/10 2# 3/10   Adductor squeezes 30x 30x  30x 3" 30x  30x   [employer] 10# 2/15 L5 3/10 10# U 2/15 10#LF  2/15 10#  2/15   LAQ's 2# 2/15 1 5# 3/10 2# 2/15 2# 2/10 2# 3/10   Seated heel slides 20x 10" 20x 10" 10x 5" 20x10"  20x 10"   Nu-step S7-S6 Ellipt L1 10 m 10m L4 10m L4 10m L4 10 m L4   Ther Activity                Gait Training        TM ------> 2 0 x10m 2 0 10m 2 0 10m ------->   Modalities        CP w/ IFC R knee 15m 15 m 15 m 15 m 15 min

## 2021-03-29 ENCOUNTER — OFFICE VISIT (OUTPATIENT)
Dept: PHYSICAL THERAPY | Facility: CLINIC | Age: 71
End: 2021-03-29
Payer: MEDICARE

## 2021-03-29 DIAGNOSIS — Z96.651 STATUS POST TOTAL RIGHT KNEE REPLACEMENT: ICD-10-CM

## 2021-03-29 DIAGNOSIS — M17.11 PRIMARY OSTEOARTHRITIS OF RIGHT KNEE: Primary | ICD-10-CM

## 2021-03-29 PROCEDURE — 97110 THERAPEUTIC EXERCISES: CPT | Performed by: PHYSICAL THERAPIST

## 2021-03-29 PROCEDURE — 97140 MANUAL THERAPY 1/> REGIONS: CPT | Performed by: PHYSICAL THERAPIST

## 2021-03-29 PROCEDURE — 97112 NEUROMUSCULAR REEDUCATION: CPT | Performed by: PHYSICAL THERAPIST

## 2021-03-29 PROCEDURE — 97014 ELECTRIC STIMULATION THERAPY: CPT | Performed by: PHYSICAL THERAPIST

## 2021-03-29 NOTE — PROGRESS NOTES
Daily Note     Today's date: 3/29/2021  Patient name: Mary Brown  : 1950  MRN: [id number]  Referring provider: Eladio Castro DO  Dx:   Encounter Diagnosis     ICD-10-CM    1  Primary osteoarthritis of right knee  M17 11    2  Status post total right knee replacement  Z96 651                   Subjective: Pt rates pain at 4/10  Pt reports she was able to increase with dynasplit to 40 minutes over the weekend and was using her elliptical        Objective: See treatment diary below  PROM flex 78 degrees  Increased to 2 5# with all standing and mat exercises  Assessment: Tolerated treatment well  Patient still with increased tissue tightness with flexion ROM limited to 78 degrees passively  Improved patellar mobility noted during joint mobilization this date  Pt demonstrating improved gait pattern with decreased limp improved fluidity and increased step length  Pain decreased to 3/10 post session  Plan: Continue per plan of care        Precautions: R TKR 2021  Date: 3/26 3/29 3/19 3/22 3/24   Pain: 4 4 3 4 4   Visit:    Manuals        PROM and jt mobilization R knee [facility] ds ds ds   Calf and Hs stretching [facility] ds ds ds   IT band stretch [facility] ds ds ds   Desensitization         Edema K tape        Neuro Re-Ed        Standing marching 2# 2/ 2 5# 2/10 2# 2/10 2# 2/10 2# 2/10   Standing hip abduction 2# 2/10  2 5# 2/10 2# 2/10 2# 2/10 2# 2/10   Step ups ------>       Mini-squats /10 2/10 2/10 2/10 2/10   SLS 15" x3 15" x3      Tandem stance 15" x3 ea 15" x3 ea 15' 3x 15'  15" x3   Toe-raises 15 2 5# 2/15 30x 30x 3/10   Ther Ex        Quad sets 30 x 3" hold 30x 3" 30x 3" 30x 3" 30x 3   SLR c hang(2) 2# 4/5 2 5# 4/5 2 hangs 2# 4/5 2# 4/5 2# 4/5   Heel slides L5 2/15 L5 3/10 L5 3/10 L5 3/10 L5 3/10   Supine hip abd L5 2/15 L5 3/10 L5 3/10 L5 3/10 L5 3/10   SAQ's 2# 2/15 2 5# 3/10 2# 3/10 2# 3/10 2# 3/10   Adductor squeezes 30x 30x  30x 3" 30x  30x   [employer] 10# 2/15 10# SL 2/15 10# U 2/15 10#LF  2/15 10#  2/15   LAQ's 2# 2/15 2 5# 3/10 2# 2/15 2# 2/10 2# 3/10   Seated heel slides 20x 10" 20x 10" 10x 5" 20x10"  20x 10"   Nu-step S7-S6 Ellipt L1 10 m 10m L1 Elliptical 10m L4 10m L4 10 m L4   Ther Activity                Gait Training        TM ------>  2 0 10m 2 0 10m ------->   Modalities        CP w/ IFC R knee 15m 15 m 15 m 15 m 15 min

## 2021-03-31 ENCOUNTER — PATIENT OUTREACH (OUTPATIENT)
Dept: CASE MANAGEMENT | Facility: OTHER | Age: 71
End: 2021-03-31

## 2021-03-31 ENCOUNTER — OFFICE VISIT (OUTPATIENT)
Dept: PHYSICAL THERAPY | Facility: CLINIC | Age: 71
End: 2021-03-31
Payer: MEDICARE

## 2021-03-31 DIAGNOSIS — Z96.651 STATUS POST TOTAL RIGHT KNEE REPLACEMENT: ICD-10-CM

## 2021-03-31 DIAGNOSIS — M17.11 PRIMARY OSTEOARTHRITIS OF RIGHT KNEE: Primary | ICD-10-CM

## 2021-03-31 PROCEDURE — 97140 MANUAL THERAPY 1/> REGIONS: CPT

## 2021-03-31 PROCEDURE — 97110 THERAPEUTIC EXERCISES: CPT

## 2021-03-31 PROCEDURE — 97014 ELECTRIC STIMULATION THERAPY: CPT

## 2021-03-31 PROCEDURE — 97112 NEUROMUSCULAR REEDUCATION: CPT

## 2021-03-31 NOTE — PROGRESS NOTES
Incoming phone call from patient for update to this CM  Patient is using a dynasplint and continuing with PT in the outpatient setting  Patient continues to feel a great deal of stiffness in her knee  Patient also verbalizes pain after PT and at night  Patient is using acetaminophen, ibuprofen and ice to relieve her discomfort  Reviewed home medications, s/sx to report, future appointments and how/when to report symptoms to provider vs 911  Patient verbalizes understanding and agrees to additional calls

## 2021-03-31 NOTE — PROGRESS NOTES
Daily Note     Today's date: 3/31/2021  Patient name: Bienvenido Betancourt  : 1950  MRN: [id number]  Referring provider: Silver Guzmán DO  Dx:   Encounter Diagnosis     ICD-10-CM    1  Primary osteoarthritis of right knee  M17 11    2  Status post total right knee replacement  Z96 651        Start Time: 1330  Stop Time: 1445  Total time in clinic (min): 75 minutes    Subjective: Pt reports she feels the knee is moving better; slow process  Objective: See treatment diary below      Assessment: Tolerated treatment well  Patient would benefit from continued PT      Plan: Continue per plan of care        Precautions: R TKR 2021  Date: 3/26 3/29 3/31 3/22 3/24   Pain: 4 4 4 4 4   Visit:    Manuals        PROM and jt mobilization R knee [facility] ds ds ds   Calf and Hs stretching [facility] ds ds ds   IT band stretch [facility] ds ds ds   Desensitization         Edema K tape        Neuro Re-Ed        Standing marching 2# 2 2 5# 2/10 2 5#  2# 2/10 2# 2/10   Standing hip abduction 2# 210  2 5# 2/10 2 5# 2/10 2# 2/10 2# 2/10   Step ups ------>       Mini-squats 2/10 2/10 2/10 2/10 2/10   SLS 15" x3 15" x3 3x 15"     Tandem stance 15" x3 ea 15" x3 ea 3x 15" 15'  15" x3   Toe-raises 2/15 2 5# 2/15 2 5# 2/15 30x 3/10   Ther Ex        Quad sets 30 x 3" hold 30x 3" 30x 3" 30x 3" 30x 3   SLR c hang(2) 2# 4/5 2 5# 4/5 2 hangs 2# 4/5 2# 4/5 2# 4/5   Heel slides L5 2/15 L5 3/10 ----> L5 3/10 L5 3/10   Supine hip abd L5 2/15 L5 3/10 L5 3/10 L5 3/10 L5 3/10   SAQ's 2# 2/15 2 5# 3/10 2# 3/10 2# 3/10 2# 3/10   Adductor squeezes 30x 30x  30x 3" 30x  30x   [employer], HC 10# 2/15 10# SL 2/15 15# U 2/15 10#LF  2/15 10#  2/15   LAQ's 2# 2/15 2 5# 3/10 2 5# 2/15 2# 2/10 2# 3/10   Seated heel slides 20x 10" 20x 10" 20x 5" 20x10"  20x 10"   Nu-step S7-S6 Ellipt L1 10 m 10m L1 Elliptical 10m L4 10m L4 10 m L4   Ther Activity                Gait Training        TM ------>  2 0 10m 2 0 10m ------->   Modalities        CP w/ IFC R knee 15m 15 m 15 m 15 m 15 min

## 2021-04-02 ENCOUNTER — OFFICE VISIT (OUTPATIENT)
Dept: PHYSICAL THERAPY | Facility: CLINIC | Age: 71
End: 2021-04-02
Payer: MEDICARE

## 2021-04-02 DIAGNOSIS — M17.11 PRIMARY OSTEOARTHRITIS OF RIGHT KNEE: Primary | ICD-10-CM

## 2021-04-02 DIAGNOSIS — Z96.651 STATUS POST TOTAL RIGHT KNEE REPLACEMENT: ICD-10-CM

## 2021-04-02 PROCEDURE — 97110 THERAPEUTIC EXERCISES: CPT | Performed by: PHYSICAL THERAPIST

## 2021-04-02 PROCEDURE — 97014 ELECTRIC STIMULATION THERAPY: CPT | Performed by: PHYSICAL THERAPIST

## 2021-04-02 PROCEDURE — 97112 NEUROMUSCULAR REEDUCATION: CPT | Performed by: PHYSICAL THERAPIST

## 2021-04-02 PROCEDURE — 97140 MANUAL THERAPY 1/> REGIONS: CPT | Performed by: PHYSICAL THERAPIST

## 2021-04-02 NOTE — PROGRESS NOTES
Daily Note     Today's date: 2021  Patient name: Mitra Nassar  : 1950  MRN: [id number]  Referring provider: Michael Paz DO  Dx:   Encounter Diagnosis     ICD-10-CM    1  Primary osteoarthritis of right knee  M17 11    2  Status post total right knee replacement  Z96 651                   Subjective: Pt reports pain is 5 today  Pt notes she accidentally went down the steps for one step the wrong way and stretched R knee with increased pain and stiffness noted  Objective: See treatment diary below  PROM increased to 80 degrees flex, ext -8 degrees AROM -2 PROM  Added step ups on 6" step to program       Assessment: Tolerated treatment well  Patient with increased PROM knee flex this date  Verbal cues required to avoid circumduction with step-ups  Pt demonstrating good step length and balance during ambulation  Pt continues to make slow progress with ROM into flex and extension  Pt has been compliant with dynasplint and HEP  Plan: Continue per plan of care        Precautions: R TKR 2021  Date: 3/26 3/29 3/31 4/2 3/24   Pain: 4 4 4 5 4   Visit:  20   Manuals        PROM and jt mobilization [facility]   Calf and Hs stretching [facility] ds  ds   IT band stretch MERNA [facility] ds   Desensitization         Edema K tape        Neuro Re-Ed        Standing marching 2# 2 2 5# 2/10 2 5# 2 2 5# 2/10 2# 2/10   Standing hip abduction 2# 2/10  2 5# 2/10 2 5# 2/10 2 5# 2/10 2# 2/10   Step ups ------>   6" 2/10    Mini-squats 2/10 2/10 2/10 2/15 2/10   SLS 15" x3 15" x3 3x 15" 3x 15"    Tandem stance 15" x3 ea 15" x3 ea 3x 15" 15' x3 15" x3   Toe-raises 2/15 2 5# 2/15 2 5# 2/15 2 5# 30x 3/10   Ther Ex        Quad sets 30 x 3" hold 30x 3" 30x 3" 30x 3" 30x 3   SLR c hang(2) 2# 4/5 2 5# 4/5 2 hangs 2# 4/5 2 5# 4/5 2# 4/5   Heel slides L5 2/15 L5 3/10 ----> L5 3/10 L5 3/10   Supine hip abd L5 2/15 L5 3/10 L5 3/10 L5 3/10 L5 3/10   SAQ's 2# 2/15 2 5# 3/10 2# 3/10 2 5# 3/10 2# 3/10 Adductor squeezes 30x 30x  30x 3" 30x  30x   [employer], HC 10# 2/15 10# SL 2/15 15# U 2/15 15#LF SL  2/15 10#  2/15   LAQ's 2# 2/15 2 5# 3/10 2 5# 2/15 2 5# 2/15 2# 3/10   Seated heel slides 20x 10" 20x 10" 20x 5" 20x10"  20x 10"   Nu-step S7-S6 Ellipt L1 10 m 10m L1 Elliptical 10m L4 10 m L1 Ellip 10 m L4   Ther Activity                Gait Training        TM ------>  2 0 10m  ------->   Modalities        CP w/ IFC R knee 15m 15 m 15 m 15 m 15 min

## 2021-04-05 ENCOUNTER — OFFICE VISIT (OUTPATIENT)
Dept: PHYSICAL THERAPY | Facility: CLINIC | Age: 71
End: 2021-04-05
Payer: MEDICARE

## 2021-04-05 DIAGNOSIS — M17.11 PRIMARY OSTEOARTHRITIS OF RIGHT KNEE: Primary | ICD-10-CM

## 2021-04-05 DIAGNOSIS — Z96.651 STATUS POST TOTAL RIGHT KNEE REPLACEMENT: ICD-10-CM

## 2021-04-05 PROCEDURE — 97014 ELECTRIC STIMULATION THERAPY: CPT

## 2021-04-05 PROCEDURE — 97110 THERAPEUTIC EXERCISES: CPT

## 2021-04-05 PROCEDURE — 97140 MANUAL THERAPY 1/> REGIONS: CPT

## 2021-04-05 PROCEDURE — 97112 NEUROMUSCULAR REEDUCATION: CPT

## 2021-04-05 NOTE — PROGRESS NOTES
Daily Note     Today's date: 2021  Patient name: Jong Hampton  : 1950  MRN: [id number]  Referring provider: Raymond Bustos DO  Dx:   Encounter Diagnosis     ICD-10-CM    1  Primary osteoarthritis of right knee  M17 11    2  Status post total right knee replacement  Z96 651        Start Time: 1400  Stop Time: 1515  Total time in clinic (min): 75 minutes    Subjective: Pt reports continued use of her home stretching machine  Objective: See treatment diary below      Assessment: Tolerated treatment well  Patient would benefit from continued PT      Plan: Continue per plan of care        Precautions: R TKR 2021  Date: 3/26 3/29 3/31 4/2 4/5   Pain: 4 4 4 5 4   Visit:  25   Manuals        PROM and jt mobilization [facility]   Calf and Hs stretching [facility] ds   IT band stretch [facility] ds   Desensitization         Edema K tape        Neuro Re-Ed        Standing marching 2# 2 2 5# 2/10 2 5# 2/20 2 5# 2/10 2 5# 2/10   Standing hip abduction 2# 2/10  2 5# 2/10 2 5# 2/10 2 5# 2/10 2 5# 2/10   Step ups ------>   6" 210 6" 2/10   Mini-squats 2/10 210 2/10 2/15 2/10   SLS 15" x3 15" x3 3x 15" 3x 15" 3x 15"   Tandem stance 15" x3 ea 15" x3 ea 3x 15" 15' x3 15" x3   Toe-raises 2/15 2 5# 2/15 2 5# 2/15 2 5# 30x 2 5#  2/15   Ther Ex        Quad sets 30 x 3" hold 30x 3" 30x 3" 30x 3" 30x 3"   SLR c hang(2) 2# 4/5 2 5# 4/5 2 hangs 2# 4/5 2 5# 4/5 2 5# 4/5   Heel slides L5 2/15 L5 3/10 ----> L5 3/10 DC   Supine hip abd L5 2/15 L5 3/10 L5 3/10 L5 3/10 L5 3/10   SAQ's 2# 2/15 2 5# 3/10 2# 3/10 2 5# 3/10 2 5# 3/10   Adductor squeezes 30x 30x  30x 3" 30x  30x   [employer], HC 10# 2/15 10# SL 2/15 15# U 2/15 20# U  2/15 10#  2/15   LAQ's 2# 2/15 2 5# 3/10 2 5# 2/15 2 5# 2/15 2 5# 3/10   Leg Press     10" 1/10   Seated heel slides 20x 10" 20x 10" 20x 5" 20x10"  20x 10"   Nu-step S7-S6 Ellipt L1 10 m 10m L1 Elliptical 10m L4 10 m L1 Ellip 10 m L4 + ellip     Ther Activity Gait Training        TM ------>  2 0 10m  2 0 5m   Modalities        CP w/ IFC R knee 15m 15 m 15 m 15 m 15 m

## 2021-04-07 ENCOUNTER — OFFICE VISIT (OUTPATIENT)
Dept: PHYSICAL THERAPY | Facility: CLINIC | Age: 71
End: 2021-04-07
Payer: MEDICARE

## 2021-04-07 DIAGNOSIS — M17.11 PRIMARY OSTEOARTHRITIS OF RIGHT KNEE: Primary | ICD-10-CM

## 2021-04-07 DIAGNOSIS — Z96.651 STATUS POST TOTAL RIGHT KNEE REPLACEMENT: ICD-10-CM

## 2021-04-07 PROCEDURE — 97112 NEUROMUSCULAR REEDUCATION: CPT | Performed by: PHYSICAL THERAPIST

## 2021-04-07 PROCEDURE — 97014 ELECTRIC STIMULATION THERAPY: CPT | Performed by: PHYSICAL THERAPIST

## 2021-04-07 PROCEDURE — 97140 MANUAL THERAPY 1/> REGIONS: CPT | Performed by: PHYSICAL THERAPIST

## 2021-04-07 PROCEDURE — 97110 THERAPEUTIC EXERCISES: CPT | Performed by: PHYSICAL THERAPIST

## 2021-04-07 NOTE — PROGRESS NOTES
Daily Note     Today's date: 2021  Patient name: Ricardo Galaviz  : 1950  MRN: [id number]  Referring provider: Mile Young DO  Dx:   Encounter Diagnosis     ICD-10-CM    1  Primary osteoarthritis of right knee  M17 11    2  Status post total right knee replacement  Z96 651                   Subjective: Pt notes increased stiffness this date due to walking more and in the car for 2 hours total  Pain rated 4/10  Pt also reports she sees the doctor tomorrow  Objective: See treatment diary below  PROM flex 77 degrees in sitting this date  Pt still lacking full extension both actively and passively  Assessment: Tolerated treatment well  Patient reporting decreased stiffness and tightness post session this date  Pt continues to tolerate all TE well but continues with limited ROM actively and passively both into flex and extension  Plan: Continue per plan of care        Precautions: R TKR 2021  Date: 4/7 3/29 3/31 4/2 4/5   Pain: 4 4 4 5 4   Visit: 26 22 23 24 25   Manuals        PROM and jt mobilization R knee [facility] ds   Calf and Hs stretching [facility] ds   IT band stretch [facility] ds   Desensitization         Edema K tape        Neuro Re-Ed        Standing marching 3# 2/20 2 5# 2/10 2 5# 2/20 2 5# 2/10 2 5# 2/10   Standing hip abduction 3# 2/10  2 5# 2/10 2 5# 2/10 2 5# 2/10 2 5# 2/10   Step ups 6" 2/10   6" 2/10 6" 2/10   Mini-squats 10 2/10 2/10 2/15 2/10   SLS 15" x3 15" x3 3x 15" 3x 15" 3x 15"   Tandem stance 15" x3 ea 15" x3 ea 3x 15" 15' x3 15" x3   Toe-raises 3# 2/15 2 5# 2/15 2 5# 2/15 2 5# 30x 2 5#  2/15   Ther Ex        Quad sets 30 x 3" hold 30x 3" 30x 3" 30x 3" 30x 3"   SLR c hang(2) 3# 4/5 2 5# 4/5 2 hangs 2# 4/5 2 5# 4/5 2 5# 4/5   Heel slides DC L5 3/10 ----> L5 3/10 DC   Supine hip abd L5 2/15 L5 310 L5 310 L5 3/10 L5 3/10   SAQ's 3# 2/15 2 5# 3/10 2# 3/10 2 5# 3/10 2 5# 310   Adductor squeezes 30x 30x  30x 3" 30x  30x   [employer],  10# 2/15 10# SL 2/15 15# U 2/15 20# U  2/15 10#  2/15   LAQ's 3# 2/15 2 5# 3/10 2 5# 2/15 2 5# 2/15 2 5# 3/10   Leg Press     10" 1/10   Seated heel slides 20x 10" 20x 10" 20x 5" 20x10"  20x 10"   Nu-step S7-S6 Nu-step 10m 10m L1 Elliptical 10m L4 10 m L1 Ellip 10 m L4 + ellip     Ther Activity                Gait Training        TM 10 min 1 5-2 0  2 0 10m  2 0 5m   Modalities        CP w/ IFC R knee 15m 15 m 15 m 15 m 15 m

## 2021-04-08 ENCOUNTER — TELEPHONE (OUTPATIENT)
Dept: OBGYN CLINIC | Facility: HOSPITAL | Age: 71
End: 2021-04-08

## 2021-04-08 ENCOUNTER — APPOINTMENT (OUTPATIENT)
Dept: RADIOLOGY | Facility: MEDICAL CENTER | Age: 71
End: 2021-04-08
Payer: MEDICARE

## 2021-04-08 ENCOUNTER — OFFICE VISIT (OUTPATIENT)
Dept: OBGYN CLINIC | Facility: CLINIC | Age: 71
End: 2021-04-08

## 2021-04-08 ENCOUNTER — PREP FOR PROCEDURE (OUTPATIENT)
Dept: OBGYN CLINIC | Facility: CLINIC | Age: 71
End: 2021-04-08

## 2021-04-08 VITALS
WEIGHT: 174 LBS | BODY MASS INDEX: 26.37 KG/M2 | DIASTOLIC BLOOD PRESSURE: 89 MMHG | SYSTOLIC BLOOD PRESSURE: 190 MMHG | HEART RATE: 103 BPM | HEIGHT: 68 IN

## 2021-04-08 DIAGNOSIS — M24.661 ARTHROFIBROSIS OF KNEE JOINT, RIGHT: ICD-10-CM

## 2021-04-08 DIAGNOSIS — Z96.651 S/P TOTAL KNEE REPLACEMENT, RIGHT: Primary | ICD-10-CM

## 2021-04-08 DIAGNOSIS — Z96.651 S/P TOTAL KNEE REPLACEMENT, RIGHT: ICD-10-CM

## 2021-04-08 PROCEDURE — 99024 POSTOP FOLLOW-UP VISIT: CPT | Performed by: ORTHOPAEDIC SURGERY

## 2021-04-08 PROCEDURE — 73562 X-RAY EXAM OF KNEE 3: CPT

## 2021-04-08 RX ORDER — NAPROXEN 500 MG/1
500 TABLET ORAL 2 TIMES DAILY WITH MEALS
Qty: 180 TABLET | Refills: 2 | Status: SHIPPED | OUTPATIENT
Start: 2021-04-08 | End: 2021-04-09 | Stop reason: SDUPTHER

## 2021-04-08 RX ORDER — CHLORHEXIDINE GLUCONATE 0.12 MG/ML
15 RINSE ORAL ONCE
Status: CANCELLED | OUTPATIENT
Start: 2021-04-19 | End: 2021-04-08

## 2021-04-08 NOTE — TELEPHONE ENCOUNTER
naproxen (EC NAPROSYN) 500 MG EC tablet [[id number]]    Naprosyn isn't covered by her insurance, but the generic Naproxen is covered  Please cxl the rx from [facility] and change the prescription to generic and re-send to [facility] (mail order)      Callback #[phone]

## 2021-04-08 NOTE — H&P (VIEW-ONLY)
Assessment/Plan:    No problem-specific Assessment & Plan notes found for this encounter  Diagnoses and all orders for this visit:    S/P total knee replacement, right  -     XR knee 3 vw right non injury; Future    Arthrofibrosis of knee joint, right           the patient apparently has plateaued with her therapy  At this point, she has opted for a manipulation under anesthesia of her right knee  All risks, complications, benefits were discussed with the patient and her  in great detail including bleeding, infection, blood clots, pain, stiffness, neurovascular damage, fractures, dislocations, the possibility of loss of life or limb from surgery, heart attack, stroke, etcetera  The patient understands if she does not move her knee, she may end up with a permanently fibrosed total knee  Her surgery is tentatively scheduled for Monday April 19, 2021  As soon as she is discharge, she will go right to physical therapy which will have to do on a daily basis  Subjective:      Patient ID: Patricia Valencia is a 78 y o  female  HPI       The patient is status post right total knee replacement from January 20th  She still having pain and stiffness in her knee  She denies any numbness or tingling  She denies any fever chills  She has plateaued with physical therapy  She is still using the Grazyna splint with minimal improvement    The following portions of the patient's history were reviewed and updated as appropriate: allergies, current medications, past family history, past medical history, past social history, past surgical history and problem list     Review of Systems   Constitutional: Negative for chills, fever and unexpected weight change  HENT: Negative for hearing loss, nosebleeds and sore throat  Eyes: Negative for pain, redness and visual disturbance  Respiratory: Negative for cough, shortness of breath and wheezing      Cardiovascular: Negative for chest pain, palpitations and leg swelling  Gastrointestinal: Negative for abdominal pain, nausea and vomiting  Endocrine: Negative for polydipsia and polyuria  Genitourinary: Negative for dysuria and hematuria  Musculoskeletal: Positive for arthralgias, gait problem and myalgias  Negative for back pain, joint swelling, neck pain and neck stiffness  As noted in HPI   Skin: Negative for rash and wound  Neurological: Negative for dizziness, numbness and headaches  Psychiatric/Behavioral: Negative for decreased concentration and suicidal ideas  The patient is not nervous/anxious  Objective:      BP (!) 190/89   Pulse 103   Ht 5' 8" (1 727 m)   Wt 78 9 kg (174 lb)   BMI 26 46 kg/m²          Physical Exam       right lower extremity is neurovascular intact  Toes are pink and mobile  Compartments are soft  Range of motion knee is from 5 to 80  There is tenderness throughout the capsule  No warmth erythema  No collateral ligament dysfunction  Quadriceps strength is fairly intact        X-ray show well-seated right total knee replacement without any loosening the prosthesis

## 2021-04-08 NOTE — PROGRESS NOTES
Assessment/Plan:    No problem-specific Assessment & Plan notes found for this encounter  Diagnoses and all orders for this visit:    S/P total knee replacement, right  -     XR knee 3 vw right non injury; Future    Arthrofibrosis of knee joint, right           the patient apparently has plateaued with her therapy  At this point, she has opted for a manipulation under anesthesia of her right knee  All risks, complications, benefits were discussed with the patient and her  in great detail including bleeding, infection, blood clots, pain, stiffness, neurovascular damage, fractures, dislocations, the possibility of loss of life or limb from surgery, heart attack, stroke, etcetera  The patient understands if she does not move her knee, she may end up with a permanently fibrosed total knee  Her surgery is tentatively scheduled for Monday April 19, 2021  As soon as she is discharge, she will go right to physical therapy which will have to do on a daily basis  Subjective:      Patient ID: Di Murphy is a 78 y o  female  HPI       The patient is status post right total knee replacement from January 20th  She still having pain and stiffness in her knee  She denies any numbness or tingling  She denies any fever chills  She has plateaued with physical therapy  She is still using the Grazyna splint with minimal improvement    The following portions of the patient's history were reviewed and updated as appropriate: allergies, current medications, past family history, past medical history, past social history, past surgical history and problem list     Review of Systems   Constitutional: Negative for chills, fever and unexpected weight change  HENT: Negative for hearing loss, nosebleeds and sore throat  Eyes: Negative for pain, redness and visual disturbance  Respiratory: Negative for cough, shortness of breath and wheezing      Cardiovascular: Negative for chest pain, palpitations and leg swelling  Gastrointestinal: Negative for abdominal pain, nausea and vomiting  Endocrine: Negative for polydipsia and polyuria  Genitourinary: Negative for dysuria and hematuria  Musculoskeletal: Positive for arthralgias, gait problem and myalgias  Negative for back pain, joint swelling, neck pain and neck stiffness  As noted in HPI   Skin: Negative for rash and wound  Neurological: Negative for dizziness, numbness and headaches  Psychiatric/Behavioral: Negative for decreased concentration and suicidal ideas  The patient is not nervous/anxious  Objective:      BP (!) 190/89   Pulse 103   Ht 5' 8" (1 727 m)   Wt 78 9 kg (174 lb)   BMI 26 46 kg/m²          Physical Exam       right lower extremity is neurovascular intact  Toes are pink and mobile  Compartments are soft  Range of motion knee is from 5 to 80  There is tenderness throughout the capsule  No warmth erythema  No collateral ligament dysfunction  Quadriceps strength is fairly intact        X-ray show well-seated right total knee replacement without any loosening the prosthesis

## 2021-04-09 ENCOUNTER — OFFICE VISIT (OUTPATIENT)
Dept: PHYSICAL THERAPY | Facility: CLINIC | Age: 71
End: 2021-04-09
Payer: MEDICARE

## 2021-04-09 DIAGNOSIS — M17.11 PRIMARY OSTEOARTHRITIS OF RIGHT KNEE: Primary | ICD-10-CM

## 2021-04-09 DIAGNOSIS — Z96.651 STATUS POST TOTAL KNEE REPLACEMENT, RIGHT: Primary | ICD-10-CM

## 2021-04-09 DIAGNOSIS — Z96.651 STATUS POST TOTAL RIGHT KNEE REPLACEMENT: ICD-10-CM

## 2021-04-09 PROCEDURE — 97014 ELECTRIC STIMULATION THERAPY: CPT | Performed by: PHYSICAL THERAPIST

## 2021-04-09 PROCEDURE — 97112 NEUROMUSCULAR REEDUCATION: CPT | Performed by: PHYSICAL THERAPIST

## 2021-04-09 PROCEDURE — 97140 MANUAL THERAPY 1/> REGIONS: CPT | Performed by: PHYSICAL THERAPIST

## 2021-04-09 PROCEDURE — 97110 THERAPEUTIC EXERCISES: CPT | Performed by: PHYSICAL THERAPIST

## 2021-04-09 RX ORDER — NAPROXEN 500 MG/1
500 TABLET ORAL 2 TIMES DAILY WITH MEALS
Qty: 180 TABLET | Refills: 2 | Status: SHIPPED | OUTPATIENT
Start: 2021-04-09

## 2021-04-09 NOTE — PROGRESS NOTES
Daily Note     Today's date: 2021  Patient name: Jong Hampton  : 1950  MRN: [id number]  Referring provider: Raymond Bustos DO  Dx:   Encounter Diagnosis     ICD-10-CM    1  Primary osteoarthritis of right knee  M17 11    2  Status post total right knee replacement  Z96 651                   Subjective:  Pt saw MD yesterday who scheduled manipulation for her  Pt notes she increased to resistance on dynamic flexion brace and knee is sore  Objective: See treatment diary below  TE directed per grid with increase to 3 # weights  Assessment: Tolerated treatment well  Pt with improved patellar mobility during mobilization  Pt still with increased stiffness and tightness R knee with flexion and PROM flexion  Pt otherwise tolerates all TE well with improved stability noted during balance exercises  Patient would benefit from continued PT      Plan: Continue per plan of care        Precautions: R TKR 2021  Date: 4/7 4/9 3/31 4/2 4/5   Pain: 4 4 4 5 4   Visit: 26 27 23 24 25   Manuals        PROM and jt mobilization R knee [facility] ds   Calf and Hs stretching [facility] ds   IT band stretch [facility] ds   Desensitization         Edema K tape        Neuro Re-Ed        Standing marching 3# 2/ 3# 2/10 2 5# 2/ 2 5# 2/10 2 5# 2/10   Standing hip abduction 3# 2/10  3# 2/10 2 5# 2/10 2 5# 2/10 2 5# 2/10   Step ups 6" 2/10 6" 2/10  6" 2/10 6" 2/10   Mini-squats 10 2/10 2/10 2/15 2/10   SLS 15" x3 15" x3 3x 15" 3x 15" 3x 15"   Tandem stance 15" x3 ea 15" x3 ea 3x 15" 15' x3 15" x3   Toe-raises 3# 2/15 3# 2/15 2 5# 2/15 2 5# 30x 2 5#  2/15   Ther Ex        Quad sets 30 x 3" hold 30x 3" 30x 3" 30x 3" 30x 3"   SLR c hang(2) 3# 4/5 3# 4/5 2 hangs 2# 4/5 2 5# 4/5 2 5# 4/5      ----> L5 310 DC   Supine hip abd L5 2/15 L5 310 L5 3/10 L5 3/10 L5 3/10   SAQ's 3# 2/15 3# 3/10 2# 3/10 2 5# 3/10 2 5# 3/10   Adductor squeezes 30x 30x  30x 3" 30x  30x   [employer], HC 10# 2/15 10# SL 2/15 15# U 2/15 20# U  2/15 10#  2/15   LAQ's 3# 2/15 3# 3/10 2 5# 2/15 2 5# 2/15 2 5# 3/10   Leg Press  20# 3/10   10" 1/10   Seated heel slides 20x 10" 20x 10" 20x 5" 20x10"  20x 10"   Nu-step S7-S6 Nu-step 10m Nu-step 10 min 10m L4 10 m L1 Ellip 10 m L4 + ellip     Ther Activity                Gait Training        TM 10 min 1 5-2 0 10 min 1 5-2 0 2 0 10m  2 0 5m   Modalities        CP w/ IFC R knee 15m 15 m 15 m 15 m 15 m

## 2021-04-12 ENCOUNTER — OFFICE VISIT (OUTPATIENT)
Dept: PHYSICAL THERAPY | Facility: CLINIC | Age: 71
End: 2021-04-12
Payer: MEDICARE

## 2021-04-12 DIAGNOSIS — Z96.651 STATUS POST TOTAL RIGHT KNEE REPLACEMENT: ICD-10-CM

## 2021-04-12 DIAGNOSIS — M17.11 PRIMARY OSTEOARTHRITIS OF RIGHT KNEE: Primary | ICD-10-CM

## 2021-04-12 PROCEDURE — 97110 THERAPEUTIC EXERCISES: CPT | Performed by: PHYSICAL THERAPIST

## 2021-04-12 PROCEDURE — 97112 NEUROMUSCULAR REEDUCATION: CPT | Performed by: PHYSICAL THERAPIST

## 2021-04-12 PROCEDURE — 97140 MANUAL THERAPY 1/> REGIONS: CPT | Performed by: PHYSICAL THERAPIST

## 2021-04-12 PROCEDURE — 97014 ELECTRIC STIMULATION THERAPY: CPT | Performed by: PHYSICAL THERAPIST

## 2021-04-12 NOTE — PROGRESS NOTES
Daily Note     Today's date: 2021  Patient name: Juan Pablo Garcia  : 1950  MRN: [id number]  Referring provider: Griffin Coles DO  Dx:   Encounter Diagnosis     ICD-10-CM    1  Primary osteoarthritis of right knee  M17 11    2  Status post total right knee replacement  Z96 651                   Subjective: Pt reports decreased stiffness this date due to not using rose marie splint yesterday  Pt notes pain is decreased to 3/10  Objective: See treatment diary below  R knee PROM flex 82 deg, ext AROM -7 deg      Assessment: Tolerated treatment well  Patient with increase in R knee PROM flexion although tightness continues to persist  Pt demonstrating fair to good balance with standing TE  No increase in pain post session  Plan: Continue per plan of care        Precautions: R TKR 2021  Date:    Pain: 4 4 3 5 4   Visit: 26 27 28 24 25   Manuals        PROM and jt mobilization R knee [facility]   Calf and Hs stretching [facility] ds   IT band stretch [facility] ds   Desensitization         Edema K tape        Neuro Re-Ed        Standing marching 3# 2 3# 2/10 3# 2 2 5# 2/10 2 5# 2/10   Standing hip abduction 3# 2/10  3# 2/10 3# 2/10 2 5# 2/10 2 5# 2/10   Step ups 6" 2/10 6" 2/10 6" 2/10 6" 2/10 6" 2/10   Mini-squats 10 2/10 2/10 215 2/10   SLS 15" x3 15" x3 3x 15" 3x 15" 3x 15"   Tandem stance 15" x3 ea 15" x3 ea 3x 15" 15' x3 15" x3   Toe-raises 3# 215 3# 2/15 3# 2/15 2 5# 30x 2 5#  2/15   Ther Ex        Quad sets 30 x 3" hold 30x 3" 30x 3" 30x 3" 30x 3"   SLR c hang(2) 3# 4/5 3# 4/5 2 hangs 3# 4/5 2 5# 4/5 2 5# 4/5       L5 3/10 DC   Supine hip abd L5 2/15 L5 3/10 L5 3/10 L5 3/10 L5 3/10   SAQ's 3# 2/15 3# 3/10 3# 310 2 5# 3/10 2 5# 3/10   Adductor squeezes 30x 30x  30x 3" 30x  30x   [employer], HC 10# 2/15 10# SL 2/15 15# U 2/15 20# U  2/15 10#  2/15   LAQ's 3# 2/15 3# 3/10 3# 2/15 2 5# 2/15 2 5# 3/10   Leg Press  20# 3/10 20# 3/10  10" 110   Seated heel slides 20x 10" 20x 10" 20x 5" 20x10"  20x 10"   Nu-step S7-S6 Nu-step 10m Nu-step 10 min  10 m L1 Ellip 10 m L4 + ellip     Ther Activity                Gait Training        TM 10 min 1 5-2 0 10 min 1 5-2 0   2 0 5m   Modalities        CP w/ IFC R knee 15m 15 m 15 m 15 m 15 m WDL

## 2021-04-14 ENCOUNTER — OFFICE VISIT (OUTPATIENT)
Dept: PHYSICAL THERAPY | Facility: CLINIC | Age: 71
End: 2021-04-14
Payer: MEDICARE

## 2021-04-14 DIAGNOSIS — Z96.651 STATUS POST TOTAL RIGHT KNEE REPLACEMENT: ICD-10-CM

## 2021-04-14 DIAGNOSIS — M17.11 PRIMARY OSTEOARTHRITIS OF RIGHT KNEE: Primary | ICD-10-CM

## 2021-04-14 PROCEDURE — 97112 NEUROMUSCULAR REEDUCATION: CPT | Performed by: PHYSICAL THERAPIST

## 2021-04-14 PROCEDURE — 97140 MANUAL THERAPY 1/> REGIONS: CPT | Performed by: PHYSICAL THERAPIST

## 2021-04-14 PROCEDURE — 97110 THERAPEUTIC EXERCISES: CPT | Performed by: PHYSICAL THERAPIST

## 2021-04-14 PROCEDURE — 97014 ELECTRIC STIMULATION THERAPY: CPT | Performed by: PHYSICAL THERAPIST

## 2021-04-14 NOTE — PROGRESS NOTES
Daily Note     Today's date: 2021  Patient name: Junior Frazier  : 1950  MRN: [id number]  Referring provider: Dewitt Kayser, DO  Dx:   Encounter Diagnosis     ICD-10-CM    1  Primary osteoarthritis of right knee  M17 11    2  Status post total right knee replacement  Z96 651                   Subjective: Pt notes pain is 3-4/10 entering clinic mainly at knee cap  Objective: See treatment diary below  PROM R knee flexion 85 degrees  Assessment: Tolerated treatment well  Patient still requiring verbal cues to avoid circumduction of R LE when performing step ups  Pt continues to demonstrate slow steady improvements in R knee flex ROM  Pt notes no overall increase in pain after TE and exercise  Plan: Continue per plan of care        Precautions: R TKR 2021  Date:    Pain: 4 4 3 5 4   Visit: 26 27 28 29 25   Manuals        PROM and jt mobilization R knee [facility] ds   Calf and Hs stretching [facility] ds   IT band stretch [facility] ds   Desensitization         Edema K tape        Neuro Re-Ed        Standing marching 3# 2 3# 2/10 3# 2 3# 2/10 2 5# 2/10   Standing hip abduction 3# 2/10  3# 2/10 3# 2/10 3# 2/10 2 5# 2/10   Step ups 6" 2/10 6" 2/10 6" 2/10 6" 2/10 6" 2/10   Mini-squats /10 2/10 2/10 2/15 2/10   SLS 15" x3 15" x3 3x 15" 3x 15" 3x 15"   Tandem stance 15" x3 ea 15" x3 ea 3x 15" 15' x3 15" x3   Toe-raises 3# 2/15 3# 2/15 3# 2/15 3# 30x 2 5#  2/15   Ther Ex        Quad sets 30 x 3" hold 30x 3" 30x 3" 30x 3" 30x 3"   SLR c hang(2) 3# 4/5 3# 4/5 2 hangs 3# 4/5 3# 4/5 2 5# 4/5       L5 3/10 DC   Supine hip abd L5 215 L5 3/10 L5 3/10 L5 3/10 L5 3/10   SAQ's 3# 2/15 3# 3/10 3# 3/10 3# 3/10 2 5# 310   Adductor squeezes 30x 30x  30x 3" 30x  30x   [employer], HC 10# 2/15 10# SL 2/15 15# U 2/15 25# U  2/15 10#  2/15   LAQ's 3# 2/15 3# 3/10 3# 2/15 3# 2/15 2 5# 3/10   Leg Press  20# 3/10 20# 3/10 20# 3/10 10" 10   Seated heel slides 20x 10" 20x 10" 20x 5" ------> 20x 10"   Nu-step S7-S6 Nu-step 10m Nu-step 10 min  10 m L1 Nu-step 10 m L4 + ellip     Ther Activity                Gait Training        TM 10 min 1 5-2 0 10 min 1 5-2 0   2 0 5m   Modalities        CP w/ IFC R knee 15m 15 m 15 m 15 m 15 m

## 2021-04-15 NOTE — PRE-PROCEDURE INSTRUCTIONS
Pre-Surgery Instructions:   Medication Instructions    ascorbic acid (VITAMIN C) 500 MG tablet Instructed patient per Anesthesia Guidelines   aspirin (ECOTRIN) 325 mg EC tablet Patient was instructed by Physician and understands   Calcium Carbonate-Vitamin D (CALCIUM 600/VITAMIN D PO) Instructed patient per Anesthesia Guidelines   Cholecalciferol (Vitamin D3) 50 MCG (2000 UT) TABS Instructed patient per Anesthesia Guidelines   magnesium oxide (MAG-OX) 400 mg Instructed patient per Anesthesia Guidelines   metoprolol succinate (TOPROL-XL) 25 mg 24 hr tablet Instructed patient per Anesthesia Guidelines   Multiple Vitamins-Minerals (multivitamin with minerals) tablet Instructed patient per Anesthesia Guidelines   naproxen (NAPROSYN) 500 mg tablet Instructed patient per Anesthesia Guidelines   Omega-3 Fatty Acids (Ultra Omega-3 Fish Oil) 1400 MG CAPS Instructed patient per Anesthesia Guidelines   omeprazole (PriLOSEC) 20 mg delayed release capsule Instructed patient per Anesthesia Guidelines   oxyCODONE-acetaminophen (PERCOCET) 5-325 mg per tablet Instructed patient per Anesthesia Guidelines  Spoke with patient medication list reviewed  Pt will stop all vitamins and supplements and nsaids until after surgery  Pt given instructions for aspirin from surgeon and ok to continue  Pt denies covid symptoms has been vaccinated 2nd sot 4/12/21  Npo after midnight may take morning medication ( Toprol XL) with sip of water  Pt understands shower instructions 1 adult visitor policy and the need for a ride home after surgery  Pt told [facility] will call Friday between 2p-7p with arrival time and directions  Med Class    Continue to take this medication on your normal schedule  If this is an oral medication and you take it in the morning, then you may take this medicine with a sip of water    ASA Med Class: Aspirin    Should be discontinued at least one week prior to planned operation, unless specifically stated otherwise by surgical service  Your Surgeon may have patient stop taking aspirin up to a week before surgery if having intracranial, middle ear, posterior eye, spine surgery or prostate surgery  [Patients taking aspirin for coronary stents should be reviewed by an anesthesiologist in the optimization clinic  Please do not discontinue aspirin in patients with coronary stents unless given specific permission to do so by the cardiologist who prescribed medication ]   If your surgeon approves please continue to take this medication on your normal schedule  You may take this medication on the morning of your surgery with a sip of water  Beta blocker Med Class    Continue to take this heart medication on your normal schedule  If this is an oral medication and you take it in the morning, then you may take this medicine with a sip of water  NSAID Med Class    Stop taking this medication at least 3 days prior to surgery/procedure  Opioid Med Class    Continue to take this medication on your normal schedule  If this is an oral medication and you take it in the morning, then you may take this medicine with a sip of water  Vitamin Med Class    You may continue to take any vitamin that your surgeon has prescribed to you up to the day before surgery  If your surgeon has not specifically prescribed this vitamin or instructed you to continue then stop taking 7 days prior to surgery

## 2021-04-16 ENCOUNTER — OFFICE VISIT (OUTPATIENT)
Dept: PHYSICAL THERAPY | Facility: CLINIC | Age: 71
End: 2021-04-16
Payer: MEDICARE

## 2021-04-16 DIAGNOSIS — Z96.651 STATUS POST TOTAL RIGHT KNEE REPLACEMENT: ICD-10-CM

## 2021-04-16 DIAGNOSIS — M17.11 PRIMARY OSTEOARTHRITIS OF RIGHT KNEE: Primary | ICD-10-CM

## 2021-04-16 PROCEDURE — 97014 ELECTRIC STIMULATION THERAPY: CPT

## 2021-04-16 PROCEDURE — 97140 MANUAL THERAPY 1/> REGIONS: CPT

## 2021-04-16 PROCEDURE — 97112 NEUROMUSCULAR REEDUCATION: CPT

## 2021-04-16 PROCEDURE — 97110 THERAPEUTIC EXERCISES: CPT

## 2021-04-16 NOTE — PROGRESS NOTES
Daily Note     Today's date: 2021  Patient name: Hollace Bernheim  : 1950  MRN: [id number]  Referring provider: Ro Hernandez DO  Dx:   Encounter Diagnosis     ICD-10-CM    1  Primary osteoarthritis of right knee  M17 11    2  Status post total right knee replacement  Z96 651        Start Time: 1000  Stop Time: 1115  Total time in clinic (min): 75 minutes    Subjective: Pt comments on increased stiffness today  Objective: See treatment diary below      Assessment: Tolerated treatment well  Patient would benefit from continued PT      Plan: Continue per plan of care        Precautions: R TKR 2021  Date:    Pain: 4 4 3 5 stiff   Visit: 26 27 28 29 30   Manuals        PROM and jt mobilization R knee [facility] ds   Calf and Hs stretching [facility] ds   IT band stretch [facility] ds   Desensitization         Edema K tape        Neuro Re-Ed        Standing marching 3# 2/20 3# 2/10 3# 2/20 3# 2/10 3# 2/10   Standing hip abduction 3# 2/10  3# 2/10 3# 2/10 3# 2/10 3# 2/10   Step ups 6" 2/10 6" 2/10 6" 2/10 6" 2/10 6" 2/10   Mini-squats 2/10 2/10 2/10 2/15 2/10   SLS 15" x3 15" x3 3x 15" 3x 15" 3x 15"   Tandem stance 15" x3 ea 15" x3 ea 3x 15" 15' x3 15" x3   Toe-raises 3# 2/15 3# 2/15 3# 2/15 3# 30x 3#  2/15   Ther Ex        Quad sets 30 x 3" hold 30x 3" 30x 3" 30x 3" 30x 3"   SLR c hang(2) 3# 4/5 3# 4/5 2 hangs 3# 4/5 3# 4/5 2 5# 4/5   Supine hip abd L5 2/15 L5 3/10 L5 3/10 L5 3/10 L5 3/10   SAQ's 3# 2/15 3# 3/10 3# 3/10 3# 3/10 3# 3/10   Adductor squeezes 30x 30x  30x 3" 30x  30x   [employer], HC 10# 2/15 10# SL 2/15 15# U 2/15 25# U  2/15 25# U   2/15   LAQ's 3# 2/15 3# 3/10 3# 2/15 3# 2/15 3# 2/15   Leg Press  20# 3/10 20# 3/10 20# 3/10 30#" 1/10   Seated heel slides 20x 10" 20x 10" 20x 5" ------> ----->   Nu-step S7-S6 Nu-step 10m Nu-step 10 min  10 m L1 Nu-step 10m L3     Ther Activity                Gait Training        TM 10 min 1 5-2 0 10 min 1 5-2 0   10m 1 5-2 0 Modalities        CP w/ IFC R knee 15m 15 m 15 m 15 m 15 m

## 2021-04-19 ENCOUNTER — ANESTHESIA EVENT (OUTPATIENT)
Dept: PERIOP | Facility: HOSPITAL | Age: 71
End: 2021-04-19
Payer: MEDICARE

## 2021-04-19 ENCOUNTER — EVALUATION (OUTPATIENT)
Dept: PHYSICAL THERAPY | Facility: CLINIC | Age: 71
End: 2021-04-19
Payer: MEDICARE

## 2021-04-19 ENCOUNTER — HOSPITAL ENCOUNTER (OUTPATIENT)
Facility: HOSPITAL | Age: 71
Setting detail: OUTPATIENT SURGERY
Discharge: HOME | End: 2021-04-19
Attending: ORTHOPAEDIC SURGERY | Admitting: ORTHOPAEDIC SURGERY
Payer: MEDICARE

## 2021-04-19 ENCOUNTER — ANESTHESIA (OUTPATIENT)
Dept: PERIOP | Facility: HOSPITAL | Age: 71
End: 2021-04-19
Payer: MEDICARE

## 2021-04-19 VITALS
SYSTOLIC BLOOD PRESSURE: 176 MMHG | OXYGEN SATURATION: 99 % | RESPIRATION RATE: 16 BRPM | HEART RATE: 85 BPM | TEMPERATURE: 97 F | HEIGHT: 68 IN | DIASTOLIC BLOOD PRESSURE: 80 MMHG | WEIGHT: 164 LBS | BODY MASS INDEX: 24.86 KG/M2

## 2021-04-19 DIAGNOSIS — M24.661 ARTHROFIBROSIS OF KNEE JOINT, RIGHT: Primary | ICD-10-CM

## 2021-04-19 DIAGNOSIS — M17.11 PRIMARY OSTEOARTHRITIS OF RIGHT KNEE: Primary | ICD-10-CM

## 2021-04-19 DIAGNOSIS — M24.661 ARTHROFIBROSIS OF KNEE JOINT, RIGHT: ICD-10-CM

## 2021-04-19 DIAGNOSIS — Z96.651 STATUS POST TOTAL RIGHT KNEE REPLACEMENT: ICD-10-CM

## 2021-04-19 PROBLEM — R11.2 PONV (POSTOPERATIVE NAUSEA AND VOMITING): Chronic | Status: ACTIVE | Noted: 2021-04-19

## 2021-04-19 PROBLEM — Z98.890 PONV (POSTOPERATIVE NAUSEA AND VOMITING): Chronic | Status: ACTIVE | Noted: 2021-04-19

## 2021-04-19 PROCEDURE — 27570 FIXATION OF KNEE JOINT: CPT | Performed by: ORTHOPAEDIC SURGERY

## 2021-04-19 PROCEDURE — 97140 MANUAL THERAPY 1/> REGIONS: CPT | Performed by: PHYSICAL THERAPIST

## 2021-04-19 PROCEDURE — 97112 NEUROMUSCULAR REEDUCATION: CPT | Performed by: PHYSICAL THERAPIST

## 2021-04-19 PROCEDURE — 97014 ELECTRIC STIMULATION THERAPY: CPT | Performed by: PHYSICAL THERAPIST

## 2021-04-19 PROCEDURE — 97110 THERAPEUTIC EXERCISES: CPT | Performed by: PHYSICAL THERAPIST

## 2021-04-19 RX ORDER — PROPOFOL 10 MG/ML
INJECTION, EMULSION INTRAVENOUS AS NEEDED
Status: DISCONTINUED | OUTPATIENT
Start: 2021-04-19 | End: 2021-04-19

## 2021-04-19 RX ORDER — PROPOFOL 10 MG/ML
INJECTION, EMULSION INTRAVENOUS CONTINUOUS PRN
Status: DISCONTINUED | OUTPATIENT
Start: 2021-04-19 | End: 2021-04-19

## 2021-04-19 RX ORDER — LIDOCAINE HYDROCHLORIDE 10 MG/ML
INJECTION, SOLUTION EPIDURAL; INFILTRATION; INTRACAUDAL; PERINEURAL AS NEEDED
Status: DISCONTINUED | OUTPATIENT
Start: 2021-04-19 | End: 2021-04-19

## 2021-04-19 RX ORDER — FENTANYL CITRATE/PF 50 MCG/ML
25 SYRINGE (ML) INJECTION
Status: DISCONTINUED | OUTPATIENT
Start: 2021-04-19 | End: 2021-04-19 | Stop reason: HOSPADM

## 2021-04-19 RX ORDER — ONDANSETRON 2 MG/ML
INJECTION INTRAMUSCULAR; INTRAVENOUS AS NEEDED
Status: DISCONTINUED | OUTPATIENT
Start: 2021-04-19 | End: 2021-04-19

## 2021-04-19 RX ORDER — BUPIVACAINE HYDROCHLORIDE AND EPINEPHRINE 5; 5 MG/ML; UG/ML
INJECTION, SOLUTION EPIDURAL; INTRACAUDAL; PERINEURAL AS NEEDED
Status: DISCONTINUED | OUTPATIENT
Start: 2021-04-19 | End: 2021-04-19 | Stop reason: HOSPADM

## 2021-04-19 RX ORDER — OXYCODONE HYDROCHLORIDE AND ACETAMINOPHEN 5; 325 MG/1; MG/1
1 TABLET ORAL EVERY 4 HOURS PRN
Qty: 28 TABLET | Refills: 0 | Status: SHIPPED | OUTPATIENT
Start: 2021-04-19 | End: 2021-04-29

## 2021-04-19 RX ORDER — SUCCINYLCHOLINE/SOD CL,ISO/PF 100 MG/5ML
SYRINGE (ML) INTRAVENOUS AS NEEDED
Status: DISCONTINUED | OUTPATIENT
Start: 2021-04-19 | End: 2021-04-19

## 2021-04-19 RX ORDER — OXYCODONE HYDROCHLORIDE AND ACETAMINOPHEN 5; 325 MG/1; MG/1
1 TABLET ORAL EVERY 4 HOURS PRN
Status: DISCONTINUED | OUTPATIENT
Start: 2021-04-19 | End: 2021-04-20 | Stop reason: HOSPADM

## 2021-04-19 RX ORDER — CHLORHEXIDINE GLUCONATE 0.12 MG/ML
15 RINSE ORAL ONCE
Status: COMPLETED | OUTPATIENT
Start: 2021-04-19 | End: 2021-04-19

## 2021-04-19 RX ORDER — SODIUM CHLORIDE, SODIUM LACTATE, POTASSIUM CHLORIDE, CALCIUM CHLORIDE 600; 310; 30; 20 MG/100ML; MG/100ML; MG/100ML; MG/100ML
75 INJECTION, SOLUTION INTRAVENOUS CONTINUOUS
Status: DISCONTINUED | OUTPATIENT
Start: 2021-04-19 | End: 2021-04-20 | Stop reason: HOSPADM

## 2021-04-19 RX ORDER — LABETALOL 20 MG/4 ML (5 MG/ML) INTRAVENOUS SYRINGE
AS NEEDED
Status: DISCONTINUED | OUTPATIENT
Start: 2021-04-19 | End: 2021-04-19

## 2021-04-19 RX ORDER — ONDANSETRON 2 MG/ML
4 INJECTION INTRAMUSCULAR; INTRAVENOUS EVERY 6 HOURS PRN
Status: DISCONTINUED | OUTPATIENT
Start: 2021-04-19 | End: 2021-04-20 | Stop reason: HOSPADM

## 2021-04-19 RX ORDER — FENTANYL CITRATE 50 UG/ML
INJECTION, SOLUTION INTRAMUSCULAR; INTRAVENOUS AS NEEDED
Status: DISCONTINUED | OUTPATIENT
Start: 2021-04-19 | End: 2021-04-19

## 2021-04-19 RX ADMIN — PROPOFOL 120 MCG/KG/MIN: 10 INJECTION, EMULSION INTRAVENOUS at 07:23

## 2021-04-19 RX ADMIN — OXYCODONE HYDROCHLORIDE AND ACETAMINOPHEN 1 TABLET: 5; 325 TABLET ORAL at 08:11

## 2021-04-19 RX ADMIN — Medication 100 MG: at 07:23

## 2021-04-19 RX ADMIN — ONDANSETRON 4 MG: 2 INJECTION INTRAMUSCULAR; INTRAVENOUS at 07:23

## 2021-04-19 RX ADMIN — SODIUM CHLORIDE, SODIUM LACTATE, POTASSIUM CHLORIDE, AND CALCIUM CHLORIDE 75 ML/HR: .6; .31; .03; .02 INJECTION, SOLUTION INTRAVENOUS at 06:31

## 2021-04-19 RX ADMIN — CHLORHEXIDINE GLUCONATE 0.12% ORAL RINSE 15 ML: 1.2 LIQUID ORAL at 06:19

## 2021-04-19 RX ADMIN — FENTANYL CITRATE 100 MCG: 50 INJECTION INTRAMUSCULAR; INTRAVENOUS at 07:19

## 2021-04-19 RX ADMIN — LABETALOL 20 MG/4 ML (5 MG/ML) INTRAVENOUS SYRINGE 10 MG: at 07:30

## 2021-04-19 RX ADMIN — PROPOFOL 150 MG: 10 INJECTION, EMULSION INTRAVENOUS at 07:23

## 2021-04-19 RX ADMIN — LIDOCAINE HYDROCHLORIDE 60 MG: 10 INJECTION, SOLUTION EPIDURAL; INFILTRATION; INTRACAUDAL; PERINEURAL at 07:23

## 2021-04-19 NOTE — PROGRESS NOTES
PT Re-Evaluation     Today's date: 2021  Patient name: Simone Silva  : 1950  MRN: [id number]  Referring provider: Ankur Bravo DO  Dx:   Encounter Diagnosis     ICD-10-CM    1  Primary osteoarthritis of right knee  M17 11    2  Status post total right knee replacement  Z96 651    3  Arthrofibrosis of knee joint, right  M24 661                   Assessment  Assessment details: Pt is a 78year old female presenting to Outpatient PT s/p R Total knee replacement on 2021  Pt has been seen for 31 visits of Outpatient PT with treatment consisting of joint mobilization,  and soft tissue mobilization to decrease tightness and spasm, manual stretching into flex and ext, AROM, strengthening balance and proprioception exercises with CP and IFC  Pt is s/p R knee manipulation this am demonstrating improvement in AROM and PROM into flex  Pt has demonstrated 2-3 lb strength gain at R knee since last reassessment with mild decrease in R hip strength since last assessment however pt with increased soreness post manipulation  Pt no longer demonstrating hard end-feel with stretching since manipulation  Pt is in need of continued PT to further improve R knee ROM, LE strength and tolerance for all weightbearing activity with normalization of gait and return to (I) IADL's  Impairments: abnormal gait, abnormal or restricted ROM, activity intolerance, impaired physical strength and pain with function  Functional limitations: prolonged standing, walking, stair negotiation, bending, squatting, ADL's, IADL's  Symptom irritability: lowUnderstanding of Dx/Px/POC: good   Prognosis: good    Goals  STG's once pt is post op to be met in 3-4 weeks  1  Decrease pain by 25% at worst with activity- progressing not met  2  Improve R knee AROM to at least 100 deg flex, -3 degrees extension- progressing not met  3  Improve R hip knee and ankle strength to within 10 lbs of L- met  4   Improve standing/walking tolerance to 30-45 minutes - met  5  Pt will sleep through night without waking due to pain- not met    LTG's to be met in 12 weeks  1  Decrease pain to 2/10 at worst with activity- not met  2  Improve ROM to at least 115 degrees flex, 0 degrees extension- not met  3  Improve R hip knee and ankle strength to within 3-5 lbs of L- not met  4  Improve step negotiation to reciprocal no LOB or instability- not met  5  Improve standing and walking tolerance to at least 1 hour -not met  6  Decrease swelling R knee by 1 cm- progressing not met  7  Independent with HEP- ongoing and progressing  8  Improve Foto score to at least 60%- met    Plan  Patient would benefit from: skilled physical therapy  Planned modality interventions: unattended electrical stimulation and cryotherapy  Planned therapy interventions: joint mobilization, manual therapy, home exercise program, therapeutic exercise, stretching, strengthening, patient education, gait training, flexibility, therapeutic activities and balance  Frequency: 5x week  Duration in weeks: 2  Plan of Care beginning date: 2021  Plan of Care expiration date: 2021  Treatment plan discussed with: patient        Subjective Evaluation    History of Present Illness  Date of surgery: 2021  Mechanism of injury: surgery  Mechanism of injury: Pt presents s/p R knee manipulation this am  Pt presents with increased soreness with mild grogginess post anesthesia  Pt reports MD achieved 115 degrees in manipulation and has ordered PT 5x/wk x2 weeks then continue at 2 or 3 times per week     Quality of life: good    Pain  Current pain ratin  At best pain ratin  At worst pain ratin  Location: R knee (lateral and medial knee)  Relieving factors: ice, rest and medications  Aggravating factors: stair climbing, walking and standing    Treatments  Previous treatment: injection treatment  Current treatment: physical therapy  Patient Goals  Patient goals for therapy: decreased pain, decreased edema, increased motion, increased strength, independence with ADLs/IADLs, return to sport/leisure activities and improved balance  Patient goal: return to 2-4 mile walks- progressing        Objective     Observations     Additional Observation Details  Antalgic gait pattern with lack of R knee flex during ambulation with SPC- pt ambulating without A  D  without circumduction but demonstrates decreased flex R knee with limp      Neurological Testing     Sensation     Knee   Left Knee   Intact: light touch    Right Knee   Intact: light touch     Active Range of Motion   Left Knee   Flexion: 115 degrees   Extension: 0 degrees     Right Knee   Flexion: 92 degrees   Extension: -7 degrees     Additional Active Range of Motion Details  Pt demonstrating weak quad set due to increase swelling- quad set returned to norm  (I) SLR no quad lag    Passive Range of Motion   Left Knee   Flexion: 120 degrees   Extension: 0 degrees     Right Knee   Flexion: 104 degrees   Extension: -2 degrees     Strength/Myotome Testing     Additional Strength Details                    R                  L  Hip       Flex      19 lbs          18 lbs      Abd       34 lbs          25 lbs      Add       22 lbs          23 lbs  Knee      Ext        29 lbs          25 lbs     Flex        33 lbs          22 lbs  Ankle       DF        22 lbs           21 lbs       PF        50 lbs           42 lbs    General Comments:      Knee Comments  Girth:                 R                  suprapat         42 3 cm       Mid joint          41 0 cm    Stair negotiation is with step to pattern due to pain- stair negotiation is still with step to pattern  Standing tolerance 30 minutes with increased pain- Remains at 45-60 minutes due to increased soreness  Walking tolerance 1 1/2 blocks with SPC limited by pain- Walking tolerance no A D   1/2 mile  Difficulty with donning shoes and socks requiring bending at waist-improved but still has to modify technique  Assist with bathing/showering- (I) with bathing and showering  Pt unable to kneel or squat- same  Sleep disturbed by pain nightly with sleep tolerance 3 hours- sleep still disturbed nightly with sleep tolerance 3-4 hours  Foto- 43- improved to 69             Precautions: R TKR 1/20/2021  Date: 4/19 4/9 4/12 4/14 4/16   Pain: 4-5 4 3 5 stiff   Visit: 31 27 28 29 30   Manuals        PROM and jt mobilization R knee [facility] ds   Calf and Hs stretching [facility] ds   IT band stretch [facility]   Desensitization         Edema K tape        Neuro Re-Ed        Standing marching -------> 3# 2/10 3# 2/20 3# 2/10 3# 2/10   Standing hip abduction -------> 3# 2/10 3# 2/10 3# 2/10 3# 2/10   Step ups -------> 6" 2/10 6" 2/10 6" 2/10 6" 2/10   Mini-squats -------> 2/10 2/10 2/15 2/10   SLS --------> 15" x3 3x 15" 3x 15" 3x 15"   Tandem stance -------> 15" x3 ea 3x 15" 15' x3 15" x3   Toe-raises --------> 3# 2/15 3# 2/15 3# 30x 3#  2/15   Ther Ex        Quad sets 30 x 3" hold 30x 3" 30x 3" 30x 3" 30x 3"   SLR c hang(2) 3# 4/5 3# 4/5 2 hangs 3# 4/5 3# 4/5 2 5# 4/5   Supine hip abd L5 2/15 L5 3/10 L5 3/10 L5 3/10 L5 3/10   SAQ's 3# 2/15 3# 3/10 3# 3/10 3# 3/10 3# 3/10   Adductor squeezes 30x 30x  30x 3" 30x  30x   [facility]  25# 2/15UL 10# SL 2/15 15# U 2/15 25# U  2/15 25# U   2/15   LAQ's 3# 2/15 3# 3/10 3# 2/15 3# 2/15 3# 2/15   Leg Press 30# 3/10 20# 3/10 20# 3/10 20# 3/10 30#" 1/10   Seated heel slides  20x 10" 20x 5" ------> ----->   Nu-step S7-S6 Bike S20 x10 FWD/BWD Nu-step 10 min  10 m L1 Nu-step 10m L3     Ther Activity                Gait Training        TM  10 min 1 5-2 0   10m 1 5-2 0   Modalities        CP w/ IFC R knee 15m CP 15 m 15 m 15 m 15 m

## 2021-04-19 NOTE — ANESTHESIA POSTPROCEDURE EVALUATION
Post-Op Assessment Note    CV Status:  Stable  Pain Score: 0    Pain management: adequate     Mental Status:  Arousable   Hydration Status:  Stable   PONV Controlled:  None   Airway Patency:  Patent      Post Op Vitals Reviewed: Yes      Staff: CRNA         No complications documented      BP   181/81   Temp  97 6   Pulse 83   Resp   16   SpO2   100

## 2021-04-19 NOTE — INTERVAL H&P NOTE
H&P reviewed  After examining the patient I find no changes in the patients condition since the H&P had been written      Vitals:    04/19/21 0613   BP: 162/69   Pulse: 84   Resp: 16   Temp: (!) 97 °F (36 1 °C)   SpO2: 99%

## 2021-04-19 NOTE — ANESTHESIA PREPROCEDURE EVALUATION
Medical History    History Comments   PONV (postoperative nausea and vomiting)    Hypertension    Hyperlipidemia    Cardiac murmur due to mitral valve disorder    PVC's (premature ventricular contractions)    GERD (gastroesophageal reflux disease)      Procedure:  MANIPULATION JOINT KNEE (Right Knee)    Relevant Problems   ANESTHESIA   (+) PONV (postoperative nausea and vomiting)      CARDIO   (+) Hyperlipidemia   (+) Hypertension   (+) PVC's (premature ventricular contractions)      GI/HEPATIC   (+) GERD (gastroesophageal reflux disease)      MUSCULOSKELETAL   (+) Degenerative joint disease of right knee   (+) Primary osteoarthritis of both knees        Physical Exam    Airway    Mallampati score: II  TM Distance: >3 FB  Neck ROM: full     Dental   No notable dental hx     Cardiovascular  Rate: normal,     Pulmonary  Pulmonary exam normal     Other Findings        Anesthesia Plan  ASA Score- 2     Anesthesia Type- general with ASA Monitors  Additional Monitors:   Airway Plan: LMA  Comment: Surgeon requests muscle paralysis  Plan Factors-Exercise tolerance (METS): >4 METS  Chart reviewed  Patient summary reviewed  Patient is not a current smoker  Induction- intravenous  Postoperative Plan- Plan for postoperative opioid use  Informed Consent- Anesthetic plan and risks discussed with patient  I personally reviewed this patient with the CRNA  Discussed and agreed on the Anesthesia Plan with the CRNA  Coby Mckoy

## 2021-04-19 NOTE — OP NOTE
OPERATIVE REPORT  PATIENT NAME: Junior Frazier    :  1950  MRN: [id number]  Pt Location: [facility] OR ROOM 02    SURGERY DATE: 2021    Surgeon(s) and Role:     * Leila Stage, DO - Primary    Preop Diagnosis:  Arthrofibrosis of knee joint, right [M24 661]  S/P total knee replacement, right [Z96 651]    Post-Op Diagnosis Codes:     * Arthrofibrosis of knee joint, right [M24 661]     * S/P total knee replacement, right [Z96 651]    Procedure(s) (LRB):  MANIPULATION JOINT KNEE (Right)   INJECTION    Specimen(s):  NONE    Estimated Blood Loss:   Minimal    Drains:  None    Anesthesia Type:   General with endotracheal intubation and local placed at conclusion    Operative Indications:  Arthrofibrosis of knee joint, right [M24 661]  S/P total knee replacement, right [Z96 651]      Operative Findings:    Pre manipulation of range of motion 5-75 degrees  Post manipulation range of motion 3-115 degrees      Complications:   None    Procedure and Technique:  The patient was properly identified and brought into the operative suite  After successful induction of the general anesthetic, and evaluation under anesthesia occurred  The patient's pre manipulated range of motion was from 5-75 degrees  A manipulation under anesthesia did occur with a gross lysis of adhesions  Once there is no further audible lysis, the procedure was stopped  Postoperative range of motion was from 3-115 degrees  Under a double preparation technique, the knee was injected with 0 5% Marcaine with epinephrine  Dry sterile dressing was applied  She was successfully awoken, and went to recovery room in stable condition  Upon discharge, she will immediately get sent to outpatient rehabilitation       I was present for the entire procedure, A qualified resident physician was not available and A physician assistant was required during the procedure for retraction tissue handling,dissection and suturing    Patient Disposition:  PACU     SIGNATURE: Rickie Leger,   DATE: April 19, 2021  TIME: 7:32 AM

## 2021-04-19 NOTE — LETTER
2021    Sharron Sanon, [address]    Patient: Santa Saeed   YOB: 1950   Date of Visit: 2021     Encounter Diagnosis     ICD-10-CM    1  Primary osteoarthritis of right knee  M17 11    2  Status post total right knee replacement  Z96 651    3  Arthrofibrosis of knee joint, right  M24 661        Dear Dr Major Ferreira:    Thank you for your recent referral of Santa Saeed  Please review the attached evaluation summary from [facility] recent visit  Please verify that you agree with the plan of care by signing the attached order  If you have any questions or concerns, please do not hesitate to call  I sincerely appreciate the opportunity to share in the care of one of your patients and hope to have another opportunity to work with you in the near future  Sincerely,    Ramandeep Burch, PT      Referring Provider:      I certify that I have read the below Plan of Care and certify the need for these services furnished under this plan of treatment while under my care  Sharron Sanon DO  [address] [place] [address] [phone]          PT Re-Evaluation     Today's date: 2021  Patient name: Santa Saeed  : 1950  MRN: [id number]  Referring provider: Kathleen Krishnan DO  Dx:   Encounter Diagnosis     ICD-10-CM    1  Primary osteoarthritis of right knee  M17 11    2  Status post total right knee replacement  Z96 651    3  Arthrofibrosis of knee joint, right  M24 661                   Assessment  Assessment details: Pt is a 78year old female presenting to Outpatient PT s/p R Total knee replacement on 2021  Pt has been seen for 31 visits of Outpatient PT with treatment consisting of joint mobilization,  and soft tissue mobilization to decrease tightness and spasm, manual stretching into flex and ext, AROM, strengthening balance and proprioception exercises with CP and IFC   Pt is s/p R knee manipulation this am demonstrating improvement in AROM and PROM into flex  Pt has demonstrated 2-3 lb strength gain at R knee since last reassessment with mild decrease in R hip strength since last assessment however pt with increased soreness post manipulation  Pt no longer demonstrating hard end-feel with stretching since manipulation  Pt is in need of continued PT to further improve R knee ROM, LE strength and tolerance for all weightbearing activity with normalization of gait and return to (I) IADL's  Impairments: abnormal gait, abnormal or restricted ROM, activity intolerance, impaired physical strength and pain with function  Functional limitations: prolonged standing, walking, stair negotiation, bending, squatting, ADL's, IADL's  Symptom irritability: lowUnderstanding of Dx/Px/POC: good   Prognosis: good    Goals  STG's once pt is post op to be met in 3-4 weeks  1  Decrease pain by 25% at worst with activity- progressing not met  2  Improve R knee AROM to at least 100 deg flex, -3 degrees extension- progressing not met  3  Improve R hip knee and ankle strength to within 10 lbs of L- met  4  Improve standing/walking tolerance to 30-45 minutes - met  5  Pt will sleep through night without waking due to pain- not met    LTG's to be met in 12 weeks  1  Decrease pain to 2/10 at worst with activity- not met  2  Improve ROM to at least 115 degrees flex, 0 degrees extension- not met  3  Improve R hip knee and ankle strength to within 3-5 lbs of L- not met  4  Improve step negotiation to reciprocal no LOB or instability- not met  5  Improve standing and walking tolerance to at least 1 hour -not met  6  Decrease swelling R knee by 1 cm- progressing not met  7  Independent with HEP- ongoing and progressing  8   Improve Foto score to at least 60%- met    Plan  Patient would benefit from: skilled physical therapy  Planned modality interventions: unattended electrical stimulation and cryotherapy  Planned therapy interventions: joint mobilization, manual therapy, home exercise program, therapeutic exercise, stretching, strengthening, patient education, gait training, flexibility, therapeutic activities and balance  Frequency: 5x week  Duration in weeks: 2  Plan of Care beginning date: 2021  Plan of Care expiration date: 2021  Treatment plan discussed with: patient        Subjective Evaluation    History of Present Illness  Date of surgery: 2021  Mechanism of injury: surgery  Mechanism of injury: Pt presents s/p R knee manipulation this am  Pt presents with increased soreness with mild grogginess post anesthesia  Pt reports MD achieved 115 degrees in manipulation and has ordered PT 5x/wk x2 weeks then continue at 2 or 3 times per week  Quality of life: good    Pain  Current pain ratin  At best pain ratin  At worst pain ratin  Location: R knee (lateral and medial knee)  Relieving factors: ice, rest and medications  Aggravating factors: stair climbing, walking and standing    Treatments  Previous treatment: injection treatment  Current treatment: physical therapy  Patient Goals  Patient goals for therapy: decreased pain, decreased edema, increased motion, increased strength, independence with ADLs/IADLs, return to sport/leisure activities and improved balance  Patient goal: return to 2-4 mile walks- progressing        Objective     Observations     Additional Observation Details  Antalgic gait pattern with lack of R knee flex during ambulation with SPC- pt ambulating without A  D  without circumduction but demonstrates decreased flex R knee with limp      Neurological Testing     Sensation     Knee   Left Knee   Intact: light touch    Right Knee   Intact: light touch     Active Range of Motion   Left Knee   Flexion: 115 degrees   Extension: 0 degrees     Right Knee   Flexion: 92 degrees   Extension: -7 degrees     Additional Active Range of Motion Details  Pt demonstrating weak quad set due to increase swelling- quad set returned to norm  (I) SLR no quad lag    Passive Range of Motion   Left Knee   Flexion: 120 degrees   Extension: 0 degrees     Right Knee   Flexion: 104 degrees   Extension: -2 degrees     Strength/Myotome Testing     Additional Strength Details                    R                  L  Hip       Flex      19 lbs          18 lbs      Abd       34 lbs          25 lbs      Add       22 lbs          23 lbs  Knee      Ext        29 lbs          25 lbs     Flex        33 lbs          22 lbs  Ankle       DF        22 lbs           21 lbs       PF        50 lbs           42 lbs    General Comments:      Knee Comments  Girth:                 R                  suprapat         42 3 cm       Mid joint          41 0 cm    Stair negotiation is with step to pattern due to pain- stair negotiation is still with step to pattern  Standing tolerance 30 minutes with increased pain- Remains at 45-60 minutes due to increased soreness  Walking tolerance 1 1/2 blocks with SPC limited by pain- Walking tolerance no A D   1/2 mile  Difficulty with donning shoes and socks requiring bending at waist-improved but still has to modify technique  Assist with bathing/showering- (I) with bathing and showering  Pt unable to kneel or squat- same  Sleep disturbed by pain nightly with sleep tolerance 3 hours- sleep still disturbed nightly with sleep tolerance 3-4 hours  Foto- 43- improved to 69             Precautions: R TKR 1/20/2021  Date: 4/19 4/9 4/12 4/14 4/16   Pain: 4-5 4 3 5 stiff   Visit: 31 27 28 29 30   Manuals        PROM and jt mobilization R knee [facility] ds   Calf and Hs stretching [facility] ds   IT band stretch [facility] ds   Desensitization         Edema K tape        Neuro Re-Ed        Standing marching -------> 3# 2/10 3# 2/20 3# 2/10 3# 2/10   Standing hip abduction -------> 3# 2/10 3# 2/10 3# 2/10 3# 2/10   Step ups -------> 6" 2/10 6" 2/10 6" 2/10 6" 2/10   Mini-squats -------> 2/10 2/10 2/15 2/10 SLS --------> 15" x3 3x 15" 3x 15" 3x 15"   Tandem stance -------> 15" x3 ea 3x 15" 15' x3 15" x3   Toe-raises --------> 3# 2/15 3# 2/15 3# 30x 3#  2/15   Ther Ex        Quad sets 30 x 3" hold 30x 3" 30x 3" 30x 3" 30x 3"   SLR c hang(2) 3# 4/5 3# 4/5 2 hangs 3# 4/5 3# 4/5 2 5# 4/5   Supine hip abd L5 2/15 L5 3/10 L5 3/10 L5 3/10 L5 3/10   SAQ's 3# 2/15 3# 3/10 3# 3/10 3# 3/10 3# 3/10   Adductor squeezes 30x 30x  30x 3" 30x  30x   [facility]  25# 2/15UL 10# SL 2/15 15# U 2/15 25# U  2/15 25# U   2/15   LAQ's 3# 2/15 3# 3/10 3# 2/15 3# 2/15 3# 2/15   Leg Press 30# 3/10 20# 3/10 20# 3/10 20# 3/10 30#" 1/10   Seated heel slides  20x 10" 20x 5" ------> ----->   Nu-step S7-S6 Bike S20 x10 FWD/BWD Nu-step 10 min  10 m L1 Nu-step 10m L3     Ther Activity                Gait Training        TM  10 min 1 5-2 0   10m 1 5-2 0   Modalities        CP w/ IFC R knee 15m CP 15 m 15 m 15 m 15 m

## 2021-04-20 ENCOUNTER — OFFICE VISIT (OUTPATIENT)
Dept: PHYSICAL THERAPY | Facility: CLINIC | Age: 71
End: 2021-04-20
Payer: MEDICARE

## 2021-04-20 ENCOUNTER — PATIENT OUTREACH (OUTPATIENT)
Dept: CASE MANAGEMENT | Facility: OTHER | Age: 71
End: 2021-04-20

## 2021-04-20 DIAGNOSIS — M24.661 ARTHROFIBROSIS OF KNEE JOINT, RIGHT: ICD-10-CM

## 2021-04-20 DIAGNOSIS — M17.11 PRIMARY OSTEOARTHRITIS OF RIGHT KNEE: Primary | ICD-10-CM

## 2021-04-20 DIAGNOSIS — Z96.651 STATUS POST TOTAL RIGHT KNEE REPLACEMENT: ICD-10-CM

## 2021-04-20 PROCEDURE — 97110 THERAPEUTIC EXERCISES: CPT | Performed by: PHYSICAL THERAPIST

## 2021-04-20 PROCEDURE — 97014 ELECTRIC STIMULATION THERAPY: CPT | Performed by: PHYSICAL THERAPIST

## 2021-04-20 PROCEDURE — 97140 MANUAL THERAPY 1/> REGIONS: CPT | Performed by: PHYSICAL THERAPIST

## 2021-04-20 PROCEDURE — 97112 NEUROMUSCULAR REEDUCATION: CPT | Performed by: PHYSICAL THERAPIST

## 2021-04-20 NOTE — PROGRESS NOTES
Outreach TC to patient for CM assessment  Patient had outpatient procedure yesterday for manipulation of her R knee prothesis  Patient deepali to outpatient PT immediately after and will follow up with PT today  Patient states she has moderate pain 6-7/10  Patient did receive a new prescription for oxycodone 5/acetaminophen 325 1 tab every 4 hours as needed for pain  Patient states that the medication does reduce the pain and she is also using ice  Patient was able to sleep comfortably yesterday  Reviewed home medications, s/sx to report, future appointments and how/when to report symptoms to provider vs 911  Patient verbalizes understanding  Patient educated on BPCI episode end  Resolved episode, removed self from care team and updated care coordination note

## 2021-04-20 NOTE — PROGRESS NOTES
Daily Note     Today's date: 2021  Patient name: Bryant Tapia  : 1950  MRN: [id number]  Referring provider: Adolfo Rangel DO  Dx:   Encounter Diagnosis     ICD-10-CM    1  Primary osteoarthritis of right knee  M17 11    2  Status post total right knee replacement  Z96 651    3  Arthrofibrosis of knee joint, right  M24 661                   Subjective: Pt notes knee was very sore after anesthesia wore off yesterday  Pt notes soreness decreased today with pain rated 3/10 but stiffness still present  Objective: See treatment diary below  PROM flexion 107 deg  Resumed full program       Assessment: Tolerated treatment well  Patient able to tolerate full program without increase in pain  Pt still with circumduction requiring verbal cues to increase hip flexion on step ups  Pt still with decreased knee flexion during ambulation however continues to demonstrate improvement in flex ROM  End-fee since manipulation remains empty limited by pain as opposed to hard end-feel before manipulation  Plan: Continue per plan of care        Precautions: R TKR 2021  Date:    Pain: 4-5 3 3 5 stiff   Visit: 31 32 28 29 30   Manuals        PROM and jt mobilization R knee [facility]   Calf and Hs stretching [facility]   IT band stretch MERNAL4 [facility] ds   Desensitization         Edema K tape        Neuro Re-Ed        Standing marching -------> 3# 2/10 3#  3# 2/10 3# 2/10   Standing hip abduction -------> 3# 2/10 3# 2/10 3# 2/10 3# 2/10   Step ups -------> 6" 2/10 6" 10 6" 10 6" 2/10   Mini-squats -------> 2/10 2/10 2/15 2/10   SLS --------> 15" x3 3x 15" 3x 15" 3x 15"   Tandem stance -------> 15" x3 ea 3x 15" 15' x3 15" x3   Toe-raises --------> 3# 2/15 3# 2/15 3# 30x 3#  2/15   Ther Ex        Quad sets 30 x 3" hold 30x 3" 30x 3" 30x 3" 30x 3"   SLR c hang(2) 3# 4/5 3# 4/5 2 hangs 3# 4/5 3# 4/5 2 5# 4/5   Supine hip abd L5 2/15 L5 3/10 L5 3/10 L5 3/10 L5 3/10   SAQ's 3# 2/15 3# 3/10 3# 3/10 3# 3/10 3# 3/10   Adductor squeezes 30x 30x  30x 3" 30x  30x   [employer], HC 25# 2/15UL 25# SL 2/15 15# U 2/15 25# U  2/15 25# U   2/15   LAQ's 3# 2/15 3# 3/10 3# 2/15 3# 2/15 3# 2/15   Leg Press 30# 3/10 30# 3/10 20# 3/10 20# 3/10 30#" 1/10   Seated heel slides   20x 5" ------> ----->   Nu-step S7-S6 Bike S20 x10 FWD/BWD Bike 8 min rocking  10 m L1 Nu-step 10m L3     Ther Activity                Gait Training        TM     10m 1 5-2 0   Modalities        CP w/ IFC R knee 15m CP 15 m 15 m 15 m 15 m

## 2021-04-21 ENCOUNTER — OFFICE VISIT (OUTPATIENT)
Dept: PHYSICAL THERAPY | Facility: CLINIC | Age: 71
End: 2021-04-21
Payer: MEDICARE

## 2021-04-21 DIAGNOSIS — M17.11 PRIMARY OSTEOARTHRITIS OF RIGHT KNEE: Primary | ICD-10-CM

## 2021-04-21 DIAGNOSIS — M24.661 ARTHROFIBROSIS OF KNEE JOINT, RIGHT: ICD-10-CM

## 2021-04-21 DIAGNOSIS — Z96.651 STATUS POST TOTAL RIGHT KNEE REPLACEMENT: ICD-10-CM

## 2021-04-21 PROCEDURE — 97014 ELECTRIC STIMULATION THERAPY: CPT

## 2021-04-21 PROCEDURE — 97110 THERAPEUTIC EXERCISES: CPT

## 2021-04-21 PROCEDURE — 97112 NEUROMUSCULAR REEDUCATION: CPT

## 2021-04-21 PROCEDURE — 97140 MANUAL THERAPY 1/> REGIONS: CPT

## 2021-04-21 NOTE — PROGRESS NOTES
Daily Note     Today's date: 2021  Patient name: Marita Colin  : 1950  MRN: [id number]  Referring provider: Altagracia Perdue DO  Dx:   Encounter Diagnosis     ICD-10-CM    1  Primary osteoarthritis of right knee  M17 11    2  Status post total right knee replacement  Z96 651    3  Arthrofibrosis of knee joint, right  M24 661        Start Time: 1400  Stop Time: 1515  Total time in clinic (min): 75 minutes    Subjective:  Pt comments on increased motion with her exercises  Objective: See treatment diary below      Assessment: Tolerated treatment well  Patient exhibited good technique with therapeutic exercises      Plan: Continue per plan of care        Precautions: R TKR 2021  Date:    Pain: 4-5 3 3 5 stiff   Visit: 31 32 33 29 30   Manuals        PROM and jt mobilization R knee [facility] JH ds JH ds   Calf and Hs stretching JH JH ds JH ds   IT band stretch JH  ----> JH ds   Desensitization         Edema K tape        Neuro Re-Ed        Standing marching -------> 3# 2/10 3#  3# 2/10 3# 2/10   Standing hip abduction -------> 3# 2/10 3# 2/10 3# 2/10 3# 2/10   Step ups -------> 6" 2/10 6" 2/10  lat 6" 2/10 6" 2/10   Mini-squats -------> 2/10 2/10 2/15 2/10   SLS --------> 15" x3 3x 15" 3x 15" 3x 15"   Tandem stance -------> 15" x3 ea 3x 15" 15' x3 15" x3   Toe-raises --------> 3# 2/15 3# 2/15 3# 30x 3#  2/15   Ther Ex        Quad sets 30 x 3" hold 30x 3" 30x 3" 30x 3" 30x 3"   SLR c hang(2) 3# 4/5 3# 4/5 2 hangs 3# 4/5 3# 4/5 2 5# 4/5   Supine hip abd L5 2/15 L5 3/10 L5 3/10 L5 3/10 L5 3/10   SAQ's 3# 2/15 3# 3/10 3# 3/10 3# 3/10 3# 3/10   Adductor squeezes 30x 30x  30x 3" 30x  30x   [employer], HC 25# 2/15UL 25# SL 2/15 15# U 2/15 25# U  2/15 25# U   2/15   LAQ's 3# 2/15 3# 3/10 3# 2/15 3# 2/15 3# 215   Leg Press 30# 3/10 30# 3/10 20# 3/10 20# 3/10 30#" 110   Seated heel slides    ------> ----->   Nu-step S7-S6 Bike S20 x10 FWD/BWD Bike 8 min rocking Bike 5m full revol 10 m L1 Nu-step 10m L3     Ther Activity                Gait Training        TM     10m 1 5-2 0   Modalities        CP w/ IFC R knee 15m CP 15 m 15 m 15 m 15 m

## 2021-04-22 ENCOUNTER — OFFICE VISIT (OUTPATIENT)
Dept: PHYSICAL THERAPY | Facility: CLINIC | Age: 71
End: 2021-04-22
Payer: MEDICARE

## 2021-04-22 DIAGNOSIS — Z96.651 STATUS POST TOTAL RIGHT KNEE REPLACEMENT: Primary | ICD-10-CM

## 2021-04-22 DIAGNOSIS — M24.661 ARTHROFIBROSIS OF KNEE JOINT, RIGHT: ICD-10-CM

## 2021-04-22 PROCEDURE — 97140 MANUAL THERAPY 1/> REGIONS: CPT

## 2021-04-22 PROCEDURE — 97110 THERAPEUTIC EXERCISES: CPT

## 2021-04-22 PROCEDURE — 97014 ELECTRIC STIMULATION THERAPY: CPT

## 2021-04-22 PROCEDURE — 97112 NEUROMUSCULAR REEDUCATION: CPT

## 2021-04-22 NOTE — PROGRESS NOTES
Daily Note     Today's date: 2021  Patient name: Juan Pablo Garcia  : 1950  MRN: [id number]  Referring provider: Griffin Coles DO  Dx:   Encounter Diagnosis     ICD-10-CM    1  Status post total right knee replacement  Z96 651    2  Arthrofibrosis of knee joint, right  M24 661        Start Time: 1345  Stop Time: 1500  Total time in clinic (min): 75 minutes    Subjective: Pt continues with mild R knee stiffness  Objective: See treatment diary below      Assessment: Tolerated treatment well  Patient exhibited good technique with therapeutic exercises      Plan: Continue per plan of care        Precautions: R TKR 2021  Date:    Pain: 4-5 3 3 3 stiff   Visit: 31 32 33 34 30   Manuals        PROM and jt mobilization R knee [facility] CHARLES JH ds ds ds   Calf and Hs stretching [facility] CHARLES JH ds ds ds   IT band stretch   ----> --- ds   Desensitization         Edema K tape        Neuro Re-Ed        Standing marching -------> 3# 2/10 3#  3# 2/10 3# 2/10   Standing hip abduction -------> 3# 2/10 3# 2/10 3# 2/10 3# 2/10   Step ups -------> 6" 2/10 6" 2/10  lat 6" 2/10  lat 6" 2/10   Mini-squats -------> 2/10 2/10 2/10 2/10   SLS --------> 15" x3 3x 15" 3x 15" 3x 15"   Tandem stance -------> 15" x3 ea 3x 15" 15' x3 15" x3   Toe-raises --------> 3# 2/15 3# 2/15 3# 30x 3#  2/15   Ther Ex        Quad sets 30 x 3" hold 30x 3" 30x 3" 30x 3" 30x 3"   SLR c hang(2) 3# 4/5 3# 4/5 2 hangs 3# 4/5 3# 4/5 2 5# 4/5   Supine hip abd L5 2/15 L5 3/10 L5 3/10 L5 3/10 L5 3/10   SAQ's 3# 2/15 3# 3/10 3# 3/10 3# 3/10 3# 3/10   Adductor squeezes 30x 30x  30x 3" 30x  30x   [employer], HC 25# 2/15UL 25# SL 2/15 15# U 2/15 25# U  2/15 25# U   2/15   LAQ's 3# 2/15 3# 3/10 3# 2/15 3# 2/15 3# 2/15   Leg Press 30# 3/10 30# 3/10 20# 3/10 20# 3/10 30#" 10   Seated heel slides    ------> ----->   Nu-step S7-S6 Bike S20 x10 FWD/BWD Bike 8 min rocking Bike 5m full revol Bike 5m  Full revol 10m L3     Ther Activity Gait Training        TM     10m 1 5-2 0   Modalities        CP w/ IFC R knee 15m CP 15 m 15 m 13 m 15 m

## 2021-04-23 ENCOUNTER — OFFICE VISIT (OUTPATIENT)
Dept: PHYSICAL THERAPY | Facility: CLINIC | Age: 71
End: 2021-04-23
Payer: MEDICARE

## 2021-04-23 DIAGNOSIS — M17.11 PRIMARY OSTEOARTHRITIS OF RIGHT KNEE: ICD-10-CM

## 2021-04-23 DIAGNOSIS — M24.661 ARTHROFIBROSIS OF KNEE JOINT, RIGHT: ICD-10-CM

## 2021-04-23 DIAGNOSIS — Z96.651 STATUS POST TOTAL RIGHT KNEE REPLACEMENT: Primary | ICD-10-CM

## 2021-04-23 PROCEDURE — 97110 THERAPEUTIC EXERCISES: CPT

## 2021-04-23 PROCEDURE — 97014 ELECTRIC STIMULATION THERAPY: CPT

## 2021-04-23 PROCEDURE — 97140 MANUAL THERAPY 1/> REGIONS: CPT

## 2021-04-23 PROCEDURE — 97112 NEUROMUSCULAR REEDUCATION: CPT

## 2021-04-23 NOTE — PROGRESS NOTES
Daily Note     Today's date: 2021  Patient name: Jong Hampton  : 1950  MRN: [id number]  Referring provider: Raymond Bustos DO  Dx:   Encounter Diagnosis     ICD-10-CM    1  Status post total right knee replacement  Z96 651    2  Arthrofibrosis of knee joint, right  M24 661    3  Primary osteoarthritis of right knee  M17 11        Start Time: 1000  Stop Time: 1100  Total time in clinic (min): 60 minutes    Subjective: Pt continues with R knee stiffness  Objective: See treatment diary below      Assessment: Tolerated treatment well  Patient exhibited good technique with therapeutic exercises      Plan: Continue per plan of care        Precautions: R TKR 2021  Date:    Pain: 4-5 3 3 3 3   Visit: 31 32 33 34 35   Manuals        PROM and jt mobilization R knee [facility] JH ds ds ds   Calf and Hs stretching JH JH ds ds ds   IT band stretch JH  ----> --- ----   Desensitization         Edema K tape        Neuro Re-Ed        Standing marching -------> 3# 2/10 3#  3# 2/10    Standing hip abduction -------> 3# 2/10 3# 2/10 3# 2/10    Step ups -------> 6" 2/10 6" 2/10  lat 6" 2/10  lat    Mini-squats -------> 2/10 2/10 2/10    SLS --------> 15" x3 3x 15" 3x 15"    Tandem stance -------> 15" x3 ea 3x 15" 15' x3    Toe-raises --------> 3# 2/15 3# 2/15 3# 30x    Ther Ex        Quad sets 30 x 3" hold 30x 3" 30x 3" 30x 3" 30x 3"   SLR c hang(2) 3# 4/5 3# 4/5 2 hangs 3# 4/5 3# 4/5 # 4/5   Supine hip abd L5 2/15 L5 3/10 L5 3/10 L5 3/10 L5 3/10   SAQ's 3# 2/15 3# 3/10 3# 3/10 3# 3/10 3# 3/10   Adductor squeezes 30x 30x  30x 3" 30x  30x   Hamstring curl 25# 2/15UL 25# SL 2/15 15# U 2/15 25# U  2/15 25# U   2/15   LAQ's 3# 2/15 3# 3/10 3# 2/15 3# 2/15 LF 10# 2/10   Leg Press 30# 3/10 30# 3/10 20# 3/10 20# 3/10 30#    Seated heel slides    ------> ---->   Bike Bike S20 x10 FWD/BWD Bike 8 min rocking Bike 5m full revol Bike 5m  Full revol 5m S19   FR     Ther Activity                Gait Training        TM        Modalities        CP w/ IFC R knee 15m CP 15 m 15 m 13 m 15 m

## 2021-04-26 ENCOUNTER — OFFICE VISIT (OUTPATIENT)
Dept: PHYSICAL THERAPY | Facility: CLINIC | Age: 71
End: 2021-04-26
Payer: MEDICARE

## 2021-04-26 DIAGNOSIS — Z96.651 STATUS POST TOTAL RIGHT KNEE REPLACEMENT: Primary | ICD-10-CM

## 2021-04-26 DIAGNOSIS — M24.661 ARTHROFIBROSIS OF KNEE JOINT, RIGHT: ICD-10-CM

## 2021-04-26 DIAGNOSIS — M17.11 PRIMARY OSTEOARTHRITIS OF RIGHT KNEE: ICD-10-CM

## 2021-04-26 PROCEDURE — 97140 MANUAL THERAPY 1/> REGIONS: CPT

## 2021-04-26 PROCEDURE — 97110 THERAPEUTIC EXERCISES: CPT

## 2021-04-26 PROCEDURE — 97014 ELECTRIC STIMULATION THERAPY: CPT

## 2021-04-26 PROCEDURE — 97112 NEUROMUSCULAR REEDUCATION: CPT

## 2021-04-26 NOTE — PROGRESS NOTES
Daily Note     Today's date: 2021  Patient name: Patricia Valencia  : 1950  MRN: [id number]  Referring provider: Anita Meraz DO  Dx:   Encounter Diagnosis     ICD-10-CM    1  Status post total right knee replacement  Z96 651    2  Arthrofibrosis of knee joint, right  M24 661    3  Primary osteoarthritis of right knee  M17 11        Start Time: 1400  Stop Time: 1500  Total time in clinic (min): 60 minutes    Subjective: Pt comments on slight increased tightness today  Objective: See treatment diary below  Assessment: Tolerated treatment well  Patient exhibited good technique with therapeutic exercises  Pt experienced momentary increased knee pain with her first full revolution on the bike  Plan: Continue per plan of care        Precautions: R TKR 2021  Date:    Pain: 3 3 3 3 3   Visit: 43 34 33 34 35   Manuals        PROM and jt mobilization R knee Ds prom JH ds ds ds   Calf and Hs stretching ds JH ds ds ds   IT band stretch   ----> --- ----   Desensitization         Edema K tape        Neuro Re-Ed        Standing marching -------> 3# 2/10 3#  3# 2/10 3# 2/10   Standing hip abduction -------> 3# 2/10 3# 2/10 3# 2/10 3# 2/10   Step ups -------> 6" 10 6" 2/10  lat 6" 2/10  lat 6" 2/10 lat   Mini-squats -------> 2/10 2/10 2/10 2/10   SLS --------> 15" x3 3x 15" 3x 15" 3x 15"   Tandem stance -------> 15" x3 ea 3x 15" 15' x3 3x 15'   Toe-raises --------> 3# 2/15 3# 2/15 3# 30x    Ther Ex        Quad sets 30 x 3"  30x 3" 30x 3" 30x 3" 30x 3"   SLR c hang(2) 3# 4/5 3# 4/5 2 hangs 3# 4/5 3# 4/5 3# 4/5   Supine hip abd L5 2/15 L5 3/10 L5 3/10 L5 3/10 L5 3/10   SAQ's 3# 2/15 3# 3/10 3# 3/10 3# 3/10 3# 3/10   Adductor squeezes 30x 30x  30x 3" 30x  30x   Hamstring curl 25# 2/15 U 25# SL 2/15 15# U 2/15 25# U  2/15 25# U   2/15   LAQ's 10#  2/15 3# 3/10 3# 2/15 3# 2/15 LF 10# 2/10   Leg Press 30# 3/10 30# 3/10 20# 3/10 20# 3/10 30#    Seated heel slides    ------> ---->   Bike 8m rocking Bike 8 min rocking Bike 5m full revol Bike 5m  Full revol 5m S19   fr     Ther Activity                Gait Training        TM        Modalities        CP w/ IFC R knee 15m CP 15 m 15 m 15 m 15 m

## 2021-04-27 ENCOUNTER — OFFICE VISIT (OUTPATIENT)
Dept: PHYSICAL THERAPY | Facility: CLINIC | Age: 71
End: 2021-04-27
Payer: MEDICARE

## 2021-04-27 DIAGNOSIS — M24.661 ARTHROFIBROSIS OF KNEE JOINT, RIGHT: ICD-10-CM

## 2021-04-27 DIAGNOSIS — M17.11 PRIMARY OSTEOARTHRITIS OF RIGHT KNEE: ICD-10-CM

## 2021-04-27 DIAGNOSIS — Z96.651 STATUS POST TOTAL RIGHT KNEE REPLACEMENT: Primary | ICD-10-CM

## 2021-04-27 PROCEDURE — 97110 THERAPEUTIC EXERCISES: CPT

## 2021-04-27 PROCEDURE — 97140 MANUAL THERAPY 1/> REGIONS: CPT

## 2021-04-27 PROCEDURE — 97112 NEUROMUSCULAR REEDUCATION: CPT

## 2021-04-27 PROCEDURE — 97014 ELECTRIC STIMULATION THERAPY: CPT

## 2021-04-27 NOTE — PROGRESS NOTES
Daily Note     Today's date: 2021  Patient name: Sergio Amor  : 1950  MRN: [id number]  Referring provider: Shauna Phoenix, DO  Dx:   Encounter Diagnosis     ICD-10-CM    1  Status post total right knee replacement  Z96 651    2  Arthrofibrosis of knee joint, right  M24 661    3  Primary osteoarthritis of right knee  M17 11        Start Time: 1400  Stop Time: 1500  Total time in clinic (min): 60 minutes    Subjective: Pt states she continues with stiffness; she continues to be unable to sleep the full night  Objective: See treatment diary below      Assessment: Tolerated treatment well  Patient exhibited good technique with therapeutic exercises      Plan: Continue per plan of care        Precautions: R TKR 2021  Date:    Pain: 3 2-3 3 3 3   Visit: 42 42 33 34 35   Manuals        PROM and jt mobilization R knee Ds prom ds prom ds ds ds   Calf and Hs stretching ds ds ds ds ds   IT band stretch   ----> --- ----   Desensitization         Edema K tape        Neuro Re-Ed        Standing marching -------> 3# 2/10 3#  3# 2/10 3# 2/10   Standing hip abduction -------> 3# 2/10 3# 2/10 3# 2/10 3# 2/10   Step ups -------> 8" 1/10 6" 2/10  lat 6" 2/10  lat 6" 2/10 lat   Mini-squats -------> -----> 2/10 2/10 2/10   SLS --------> 15" x3 3x 15" 3x 15" 3x 15"   Tandem stance -------> 15" x3  3x 15" 15' x3 3x 15'   Toe-raises --------> 3# 2/15 3# 2/15 3# 30x    Ther Ex        Quad sets 30 x 3"  30x 3" 30x 3" 30x 3" 30x 3"   SLR c hang(2) 3# 4/5 3# 4/5 3# 4/5 3# 4/5 3# 4/5   Supine hip abd L5 2/15 ----> L5 3/10 L5 3/10 L5 3/10   SAQ's 3# 2/15 3# 3/10 3# 3/10 3# 3/10 3# 3/10   Adductor squeezes 30x ----> 30x 3" 30x  30x   Hamstring curl 25# 2/15 U 20# SL 2/15 15# U 2/15 25# U  2/15 25# U   2/15   LAQ's 10#  2/15 3# 3/10 3# 2/15 3# 2/15 LF 10# 2/10   Leg Press 30# 3/10 30# 3/10 20# 3/10 20# 3/10 30#    Seated heel slides    ------> ---->   Bike 8m rocking 8m fr Bike 5m full revol Bike 5m  Full revol 5m S19   fr     Ther Activity                Gait Training        TM        Modalities        CP w/ IFC R knee 15m  15 m 15 m 15 m 15 m

## 2021-04-28 ENCOUNTER — OFFICE VISIT (OUTPATIENT)
Dept: PHYSICAL THERAPY | Facility: CLINIC | Age: 71
End: 2021-04-28
Payer: MEDICARE

## 2021-04-28 DIAGNOSIS — M24.661 ARTHROFIBROSIS OF KNEE JOINT, RIGHT: ICD-10-CM

## 2021-04-28 DIAGNOSIS — Z96.651 STATUS POST TOTAL RIGHT KNEE REPLACEMENT: Primary | ICD-10-CM

## 2021-04-28 DIAGNOSIS — M17.11 PRIMARY OSTEOARTHRITIS OF RIGHT KNEE: ICD-10-CM

## 2021-04-28 PROCEDURE — 97010 HOT OR COLD PACKS THERAPY: CPT | Performed by: PHYSICAL THERAPIST

## 2021-04-28 PROCEDURE — 97112 NEUROMUSCULAR REEDUCATION: CPT

## 2021-04-28 PROCEDURE — 97110 THERAPEUTIC EXERCISES: CPT | Performed by: PHYSICAL THERAPIST

## 2021-04-28 PROCEDURE — 97140 MANUAL THERAPY 1/> REGIONS: CPT | Performed by: PHYSICAL THERAPIST

## 2021-04-28 PROCEDURE — 97014 ELECTRIC STIMULATION THERAPY: CPT | Performed by: PHYSICAL THERAPIST

## 2021-04-28 NOTE — PROGRESS NOTES
Daily Note     Today's date: 2021  Patient name: Marita Colin  : 1950  MRN: [id number]  Referring provider: Altagracia Perdue DO  Dx:   Encounter Diagnosis     ICD-10-CM    1  Status post total right knee replacement  Z96 651    2  Arthrofibrosis of knee joint, right  M24 661    3  Primary osteoarthritis of right knee  M17 11                   Subjective: Pt notes pain is a 3/10 entering clinic  Pt notes pain was 5/10 last night and she also notes she didn't sleep well  Objective: See treatment diary below  PROM R knee flexion 107 degrees  Increased resistance on life fitness machines per grid      Assessment: Tolerated treatment well  Patient with good tolerance for TE  Decreased circumduction present with step ups  Pt able to perform lateral step ups on 8" step  Pt continues to demonstrate slow improvement in ROM into flexion with pain limiting flexion ROM  Plan: Continue per plan of care        Precautions: R TKR 2021  Date:    Pain: 3 2-3 3 3 3   Visit: 42 42 38 34 35   Manuals        PROM and jt mobilization R knee Ds prom ds prom JH ds ds   Calf and Hs stretching ds ds JH ds ds   IT band stretch    --- ----   Desensitization         Edema K tape        Neuro Re-Ed        Standing marching -------> 3# 2/10 3#  3# 2/10 3# 2/10   Standing hip abduction -------> 3# 210 3# 210 3# 2/10 3# 2/10   Step ups -------> 8" 10 8" 2/10  FWD, lat 6" 2/10  lat 6" 2/10 lat   Mini-squats -------> -----> 210 2/10 2/10   SLS --------> 15" x3 3x 15" 3x 15" 3x 15"   Tandem stance -------> 15" x3  3x 15" 15' x3 3x 15'   Toe-raises --------> 3# 2/15 3# 2/15 3# 30x    Ther Ex        Quad sets 30 x 3"  30x 3" 30x 3" 30x 3" 30x 3"   SLR c hang(2) 3# 4/5 3# 4/5 3# 4 3# 4 3# 4/   Supine hip abd L5 2/15 ----> L5 3/10 L5 3/10 L5 3/10   SAQ's 3# 2/15 3# 3/10 3# 3/10 3# 3/10 3# 3/10   Adductor squeezes 30x ----> 30x 3" 30x  30x   Hamstring curl 25# 2/15 U 20# SL 2/15 35# U 2/15 25# U  2/15 25# U   2/15   LAQ's 10#  2/15 3# 3/10 15# 2/15 3# 2/15 LF 10# 2/10   Leg Press 30# 3/10 30# 3/10 40# 3/10 20# 3/10 30#    Seated heel slides    ------> ---->   Bike 8m rocking 8m fr Bike 8m full revol S18 Bike 5m  Full revol 5m S19   fr     Ther Activity                Gait Training        TM        Modalities        CP w/ IFC R knee 15m  15 m 15 m 15 m 15 m

## 2021-04-29 ENCOUNTER — EVALUATION (OUTPATIENT)
Dept: PHYSICAL THERAPY | Facility: CLINIC | Age: 71
End: 2021-04-29
Payer: MEDICARE

## 2021-04-29 DIAGNOSIS — M17.11 PRIMARY OSTEOARTHRITIS OF RIGHT KNEE: ICD-10-CM

## 2021-04-29 DIAGNOSIS — Z96.651 STATUS POST TOTAL RIGHT KNEE REPLACEMENT: Primary | ICD-10-CM

## 2021-04-29 DIAGNOSIS — M24.661 ARTHROFIBROSIS OF KNEE JOINT, RIGHT: ICD-10-CM

## 2021-04-29 PROCEDURE — 97140 MANUAL THERAPY 1/> REGIONS: CPT | Performed by: PHYSICAL THERAPIST

## 2021-04-29 PROCEDURE — 97112 NEUROMUSCULAR REEDUCATION: CPT | Performed by: PHYSICAL THERAPIST

## 2021-04-29 PROCEDURE — 97110 THERAPEUTIC EXERCISES: CPT | Performed by: PHYSICAL THERAPIST

## 2021-04-29 PROCEDURE — 97014 ELECTRIC STIMULATION THERAPY: CPT | Performed by: PHYSICAL THERAPIST

## 2021-04-29 NOTE — PROGRESS NOTES
PT Re-Evaluation     Today's date: 2021  Patient name: Ricardo Galaviz  : 1950  MRN: [id number]  Referring provider: Mile Young DO  Dx:   Encounter Diagnosis     ICD-10-CM    1  Status post total right knee replacement  Z96 651    2  Arthrofibrosis of knee joint, right  M24 661    3  Primary osteoarthritis of right knee  M17 11                   Assessment  Assessment details: Pt is a 78year old female presenting to Outpatient PT s/p R Total knee replacement on 2021  Pt has been seen for 39 visits of Outpatient PT with treatment consisting of joint mobilization,  and soft tissue mobilization to decrease tightness and spasm, manual stretching into flex and ext, AROM, strengthening balance and proprioception exercises with CP and IFC  Pt is 1 1/2  Weeks s/p R knee manipulation presenting with increased R knee ROM into flexion and extension  Pt still limited with PROM flex at 110 and 100 degrees actively  Pt continues to demonstrate good strength gains throughout R LE however quadricep deficit still present compared to L LE  Pt demonstrating more normal gait although still with limp  Overall standing and ambulation tolerance continues to improve with pt progressing to reciprocal stair negotiation with increased ROM R knee  Pt would benefit from continued activity in PT to further improve ROM strength gait balance endurance and tolerance for all functional activity  Impairments: abnormal gait, abnormal or restricted ROM, activity intolerance, impaired physical strength and pain with function  Functional limitations: prolonged standing, walking, stair negotiation, bending, squatting, IADL's  Symptom irritability: lowUnderstanding of Dx/Px/POC: good   Prognosis: good    Goals  STG's once pt is post op to be met in 3-4 weeks  1  Decrease pain by 25% at worst with activity- progressing not met  2  Improve R knee AROM to at least 100 deg flex, -3 degrees extension- partially met  3   Improve R hip knee and ankle strength to within 10 lbs of L- met  4  Improve standing/walking tolerance to 30-45 minutes - met  5  Pt will sleep through night without waking due to pain- not met    LTG's to be met in 12 weeks  1  Decrease pain to 2/10 at worst with activity- not met  2  Improve ROM to at least 115 degrees flex, 0 degrees extension- not met  3  Improve R hip knee and ankle strength to within 3-5 lbs of L- partially met  4  Improve step negotiation to reciprocal no LOB or instability- partially met  5  Improve standing and walking tolerance to at least 1 hour - met  6  Decrease swelling R knee by 1 cm- progressing not met  7  Independent with HEP- ongoing and progressing  8  Improve Foto score to at least 60%- met    Plan  Patient would benefit from: skilled physical therapy  Planned modality interventions: unattended electrical stimulation and cryotherapy  Planned therapy interventions: joint mobilization, manual therapy, home exercise program, therapeutic exercise, stretching, strengthening, patient education, gait training, flexibility, therapeutic activities and balance  Frequency: 2x week  Duration in weeks: 4  Plan of Care beginning date: 2021  Plan of Care expiration date: 2021  Treatment plan discussed with: patient        Subjective Evaluation    History of Present Illness  Date of surgery: 2021  Mechanism of injury: surgery  Mechanism of injury: Pt reports overall improvement in R knee especially with stair negotiation  Pt reports pain is less overall but in the evening pain is still increased waking nightly still  Pt reports she is still using dynasplint at home and doing exercises     Quality of life: good    Pain  Current pain rating: 3  At best pain ratin  At worst pain ratin  Location: R knee (lateral and medial knee)  Relieving factors: ice, rest and medications  Aggravating factors: stair climbing, walking and standing    Treatments  Previous treatment: injection treatment  Current treatment: physical therapy  Patient Goals  Patient goals for therapy: decreased pain, decreased edema, increased motion, increased strength, independence with ADLs/IADLs, return to sport/leisure activities and improved balance  Patient goal: return to 2-4 mile walks- progressing        Objective     Observations     Additional Observation Details  Antalgic gait pattern with lack of R knee flex during ambulation with SPC- pt ambulating without A  D  without circumduction but still demonstrates mild limp with decreased step length but is normalizing      Neurological Testing     Sensation     Knee   Left Knee   Intact: light touch    Right Knee   Intact: light touch     Active Range of Motion   Left Knee   Flexion: 115 degrees   Extension: 0 degrees     Right Knee   Flexion: 100 degrees   Extension: -5 degrees     Additional Active Range of Motion Details  Pt demonstrating weak quad set due to increase swelling- quad set returned to norm  (I) SLR no quad lag    Passive Range of Motion   Left Knee   Flexion: 120 degrees   Extension: 0 degrees     Right Knee   Flexion: 110 degrees   Extension: -1 degrees     Strength/Myotome Testing     Additional Strength Details                    R                  L  Hip       Flex      24 lbs          24 lbs      Abd      42 lbs          38 lbs      Add      30 lbs          27 lbs  Knee      Ext        38 lbs         44 lbs     Flex        34 lbs         33 lbs  Ankle       DF        22 lbs           21 lbs       PF        50 lbs           42 lbs    General Comments:      Knee Comments  Girth:                 R                  suprapat         42 3 cm       Mid joint          41 0 cm    Stair negotiation is with step to pattern due to pain- stair negotiation has progressed to reciprocal but needs 2 HR's  Standing tolerance 30 minutes with increased pain- improved to 1 HR 15 minutes due to increased soreness  Walking tolerance 1 1/2 blocks with SPC limited by pain- Walking tolerance no A D   1 mile  Difficulty with donning shoes and socks requiring bending at waist-improved but still has to modify technique  Assist with bathing/showering- (I) with bathing and showering  Pt unable to kneel or squat- unable to kneel but able to partially squat  Sleep disturbed by pain nightly with sleep tolerance 3 hours- sleep still disturbed nightly with sleep tolerance 3-4 hours  Foto- 43- improved to 69             Precautions: R TKR 1/20/2021  Date: 4/26 4/27 4/28 4/29 4/23   Pain: 3 2-3 3 3 3   Visit: 36 40 38 44 35   Manuals        PROM and jt mobilization R knee Ds prom ds prom [facility]   Calf and Hs stretching ds ds [facility]   IT band stretch    --- ----   Desensitization         Edema K tape        Neuro Re-Ed        Standing marching -------> 3# 2/10 3# 2/20 3# 2/10 3# 2/10   Standing hip abduction -------> 3# 2/10 3# 2/10 3# 2/10 3# 2/10   Step ups -------> 8" 1/10 8" 2/10  FWD, lat 8" 2/10  lat 6" 2/10 lat   Mini-squats -------> -----> 2/10 2/10 2/10   SLS --------> 15" x3 3x 15" 3x 15" 3x 15"   Tandem stance -------> 15" x3  3x 15" 15' x3 3x 15'   Toe-raises --------> 3# 2/15 3# 2/15 3# 30x    Ther Ex        Quad sets 30 x 3"  30x 3" 30x 3" 30x 3" 30x 3"   SLR c hang(2) 3# 4/5 3# 4/5 3# 4/5 3# 4/5 3# 4/5   Supine hip abd L5 2/15 ----> L5 3/10 L5 3/10 L5 3/10   SAQ's 3# 2/15 3# 3/10 3# 3/10 3# 3/10 3# 3/10   Adductor squeezes 30x ----> 30x 3" 30x  30x   Hamstring curl 25# 2/15 U 20# SL 2/15 35# U 2/15 35# U  2/15 25# U   2/15   LF knee extension 10#  2/15 3# 3/10 15# 2/15 15# 2/15 LF 10# 2/10   Leg Press 30# 3/10 30# 3/10 40# 3/10 40# 3/10 30#    Seated heel slides    ------> ---->   Bike 8m rocking 8m fr Bike 8m full revol S18 Bike 8m  Full revol S18 5m S19   fr     Ther Activity                Gait Training        TM        Modalities        CP w/ IFC R knee 15m  15 m 15 m 15 m 15 m

## 2021-04-29 NOTE — LETTER
2021    Santo Lakhwinder, [address]    Patient: Adina Marrero   YOB: 1950   Date of Visit: 2021     Encounter Diagnosis     ICD-10-CM    1  Status post total right knee replacement  Z96 651    2  Arthrofibrosis of knee joint, right  M24 661    3  Primary osteoarthritis of right knee  M17 11        Dear Dr Luke Maguire:    Thank you for your recent referral of Adina Marrero  Please review the attached evaluation summary from [facility] recent visit  Please verify that you agree with the plan of care by signing the attached order  If you have any questions or concerns, please do not hesitate to call  I sincerely appreciate the opportunity to share in the care of one of your patients and hope to have another opportunity to work with you in the near future  Sincerely,    Casandra Mike, PT      Referring Provider:      I certify that I have read the below Plan of Care and certify the need for these services furnished under this plan of treatment while under my care  Santo Keane DO  [address] [place] [address]  [id number]  [address] [facility] [place]          PT Re-Evaluation     Today's date: 2021  Patient name: Adina Marrero  : 1950  MRN: [id number]  Referring provider: Eliazar Light DO  Dx:   Encounter Diagnosis     ICD-10-CM    1  Status post total right knee replacement  Z96 651    2  Arthrofibrosis of knee joint, right  M24 661    3  Primary osteoarthritis of right knee  M17 11                   Assessment  Assessment details: Pt is a 78year old female presenting to Outpatient PT s/p R Total knee replacement on 2021  Pt has been seen for 39 visits of Outpatient PT with treatment consisting of joint mobilization,  and soft tissue mobilization to decrease tightness and spasm, manual stretching into flex and ext, AROM, strengthening balance and proprioception exercises with CP and IFC   Pt is  Weeks s/p R knee manipulation presenting with increased R knee ROM into flexion and extension  Pt still limited with PROM flex at 110 and 100 degrees actively  Pt continues to demonstrate good strength gains throughout R LE however quadricep deficit still present compared to L LE  Pt demonstrating more normal gait although still with limp  Overall standing and ambulation tolerance continues to improve with pt progressing to reciprocal stair negotiation with increased ROM R knee  Pt would benefit from continued activity in PT to further improve ROM strength gait balance endurance and tolerance for all functional activity  Impairments: abnormal gait, abnormal or restricted ROM, activity intolerance, impaired physical strength and pain with function  Functional limitations: prolonged standing, walking, stair negotiation, bending, squatting, IADL's  Symptom irritability: lowUnderstanding of Dx/Px/POC: good   Prognosis: good    Goals  STG's once pt is post op to be met in 3-4 weeks  1  Decrease pain by 25% at worst with activity- progressing not met  2  Improve R knee AROM to at least 100 deg flex, -3 degrees extension- partially met  3  Improve R hip knee and ankle strength to within 10 lbs of L- met  4  Improve standing/walking tolerance to 30-45 minutes - met  5  Pt will sleep through night without waking due to pain- not met    LTG's to be met in 12 weeks  1  Decrease pain to 2/10 at worst with activity- not met  2  Improve ROM to at least 115 degrees flex, 0 degrees extension- not met  3  Improve R hip knee and ankle strength to within 3-5 lbs of L- partially met  4  Improve step negotiation to reciprocal no LOB or instability- partially met  5  Improve standing and walking tolerance to at least 1 hour - met  6  Decrease swelling R knee by 1 cm- progressing not met  7  Independent with HEP- ongoing and progressing  8   Improve Foto score to at least 60%- met    Plan  Patient would benefit from: skilled physical therapy  Planned modality interventions: unattended electrical stimulation and cryotherapy  Planned therapy interventions: joint mobilization, manual therapy, home exercise program, therapeutic exercise, stretching, strengthening, patient education, gait training, flexibility, therapeutic activities and balance  Frequency: 2x week  Duration in weeks: 4  Plan of Care beginning date: 2021  Plan of Care expiration date: 2021  Treatment plan discussed with: patient        Subjective Evaluation    History of Present Illness  Date of surgery: 2021  Mechanism of injury: surgery  Mechanism of injury: Pt reports overall improvement in R knee especially with stair negotiation  Pt reports pain is less overall but in the evening pain is still increased waking nightly still  Pt reports she is still using dynasplint at home and doing exercises  Quality of life: good    Pain  Current pain rating: 3  At best pain ratin  At worst pain ratin  Location: R knee (lateral and medial knee)  Relieving factors: ice, rest and medications  Aggravating factors: stair climbing, walking and standing    Treatments  Previous treatment: injection treatment  Current treatment: physical therapy  Patient Goals  Patient goals for therapy: decreased pain, decreased edema, increased motion, increased strength, independence with ADLs/IADLs, return to sport/leisure activities and improved balance  Patient goal: return to 2-4 mile walks- progressing        Objective     Observations     Additional Observation Details  Antalgic gait pattern with lack of R knee flex during ambulation with SPC- pt ambulating without A  D  without circumduction but still demonstrates mild limp with decreased step length but is normalizing      Neurological Testing     Sensation     Knee   Left Knee   Intact: light touch    Right Knee   Intact: light touch     Active Range of Motion   Left Knee   Flexion: 115 degrees   Extension: 0 degrees     Right Knee Flexion: 100 degrees   Extension: -5 degrees     Additional Active Range of Motion Details  Pt demonstrating weak quad set due to increase swelling- quad set returned to norm  (I) SLR no quad lag    Passive Range of Motion   Left Knee   Flexion: 120 degrees   Extension: 0 degrees     Right Knee   Flexion: 110 degrees   Extension: -1 degrees     Strength/Myotome Testing     Additional Strength Details                    R                  L  Hip       Flex      24 lbs          24 lbs      Abd      42 lbs          38 lbs      Add      30 lbs          27 lbs  Knee      Ext        38 lbs         44 lbs     Flex        34 lbs         33 lbs  Ankle       DF        22 lbs           21 lbs       PF        50 lbs           42 lbs    General Comments:      Knee Comments  Girth:                 R                  suprapat         42 3 cm       Mid joint          41 0 cm    Stair negotiation is with step to pattern due to pain- stair negotiation has progressed to reciprocal but needs 2 HR's  Standing tolerance 30 minutes with increased pain- improved to 1 HR 15 minutes due to increased soreness  Walking tolerance 1 1/2 blocks with SPC limited by pain- Walking tolerance no A D   1 mile  Difficulty with donning shoes and socks requiring bending at waist-improved but still has to modify technique  Assist with bathing/showering- (I) with bathing and showering  Pt unable to kneel or squat- unable to kneel but able to partially squat  Sleep disturbed by pain nightly with sleep tolerance 3 hours- sleep still disturbed nightly with sleep tolerance 3-4 hours  Foto- 43- improved to 69             Precautions: R TKR 1/20/2021  Date: 4/26 4/27 4/28 4/29 4/23   Pain: 3 2-3 3 3 3   Visit: 36 37 38 39 35   Manuals        PROM and jt mobilization R knee Ds prom ds prom [facility]   Calf and Hs stretching ds ds [facility] ds   IT band stretch    --- ----   Desensitization         Edema K tape        Neuro Re-Ed        Standing marching -------> 3# 2/10 3# 2/20 3# 2/10 3# 2/10   Standing hip abduction -------> 3# 2/10 3# 2/10 3# 2/10 3# 2/10   Step ups -------> 8" 1/10 8" 2/10  FWD, lat 8" 2/10  lat 6" 2/10 lat   Mini-squats -------> -----> 2/10 2/10 2/10   SLS --------> 15" x3 3x 15" 3x 15" 3x 15"   Tandem stance -------> 15" x3  3x 15" 15' x3 3x 15'   Toe-raises --------> 3# 2/15 3# 2/15 3# 30x    Ther Ex        Quad sets 30 x 3"  30x 3" 30x 3" 30x 3" 30x 3"   SLR c hang(2) 3# 4/5 3# 4/5 3# 4/5 3# 4/5 3# 4/5   Supine hip abd L5 2/15 ----> L5 3/10 L5 3/10 L5 3/10   SAQ's 3# 2/15 3# 3/10 3# 3/10 3# 3/10 3# 3/10   Adductor squeezes 30x ----> 30x 3" 30x  30x   Hamstring curl 25# 2/15 U 20# SL 2/15 35# U 2/15 35# U  2/15 25# U   2/15   LF knee extension 10#  2/15 3# 3/10 15# 2/15 15# 2/15 LF 10# 2/10   Leg Press 30# 3/10 30# 3/10 40# 3/10 40# 3/10 30#    Seated heel slides    ------> ---->   Bike 8m rocking 8m fr Bike 8m full revol S18 Bike 8m  Full revol S18 5m S19   fr     Ther Activity                Gait Training        TM        Modalities        CP w/ IFC R knee 15m  15 m 15 m 15 m 15 m

## 2021-04-30 ENCOUNTER — OFFICE VISIT (OUTPATIENT)
Dept: PHYSICAL THERAPY | Facility: CLINIC | Age: 71
End: 2021-04-30
Payer: MEDICARE

## 2021-04-30 DIAGNOSIS — M17.11 PRIMARY OSTEOARTHRITIS OF RIGHT KNEE: ICD-10-CM

## 2021-04-30 DIAGNOSIS — Z96.651 STATUS POST TOTAL RIGHT KNEE REPLACEMENT: Primary | ICD-10-CM

## 2021-04-30 DIAGNOSIS — M24.661 ARTHROFIBROSIS OF KNEE JOINT, RIGHT: ICD-10-CM

## 2021-04-30 PROCEDURE — 97112 NEUROMUSCULAR REEDUCATION: CPT

## 2021-04-30 PROCEDURE — 97014 ELECTRIC STIMULATION THERAPY: CPT

## 2021-04-30 PROCEDURE — 97140 MANUAL THERAPY 1/> REGIONS: CPT

## 2021-04-30 PROCEDURE — 97110 THERAPEUTIC EXERCISES: CPT

## 2021-04-30 NOTE — PROGRESS NOTES
Daily Note     Today's date: 2021  Patient name: Bryant Tapia  : 1950  MRN: [id number]  Referring provider: Adolfo Rangel DO  Dx:   Encounter Diagnosis     ICD-10-CM    1  Status post total right knee replacement  Z96 651    2  Arthrofibrosis of knee joint, right  M24 661    3  Primary osteoarthritis of right knee  M17 11        Start Time: 1000  Stop Time: 1115      Subjective: Pt comments on increased stiffness today; slight increased soreness last night  Objective: See treatment diary below      Assessment: Tolerated treatment well  Patient exhibited good technique with therapeutic exercises      Plan: Continue per plan of care        Precautions: R TKR 2021  Date:    Pain: 3 2-3 3 3 3   Visit: 42 42 38 39 40   Manuals        PROM and jt mobilization R knee Ds prom ds prom [facility] ds   Calf and Hs stretching ds ds [facility]  ds   IT band stretch    --- ----   Desensitization         Edema K tape        Neuro Re-Ed        Standing marching -------> 3# 2/10 3#  3# 2/10 3# 2/10   Standing hip abduction -------> 3# 2/10 3# 2/10 3# 2/10 3# 2/10   Step ups -------> 8" 10 8" 2/10  FWD, lat 8" 2/10  lat 8" 2/10 lat   Mini-squats -------> -----> 2/10 210 2/10   SLS --------> 15" x3 3x 15" 3x 15" 3x 15"   Tandem stance -------> 15" x3  3x 15" 15' x3 3x 15'   Toe-raises --------> 3# 2/15 3# 2/15 3# 30x 3# 2/10   Ther Ex        Quad sets 30 x 3"  30x 3" 30x 3" 30x 3" 30x 3"   SLR c hang(2) 3# 4/5 3# 4/5 3# 4/5 3# 4/5 3# 4/5   Supine hip abd L5 2/15 ----> L5 3/10 L5 3/10 L5 3/10   SAQ's 3# 2/15 3# 310 3# 3/10 3# 3/10 3# 310   Adductor squeezes 30x ----> 30x 3" 30x  30x   Hamstring curl Uni 25# 2/15 U 20# SL 2/15 35# U 2/15 35# U  2/15 25# U   2/15   LF knee extension 10#  2/15 3# 3/10 15# 2/15 15# 2/15 LF 10# 2/10   Leg Press 30# 3/10 30# 3/10 40# 3/10 40# 3/10 40#  310   Seated heel slides    ------> ---->   Bike 8m rocking 8m fr Bike 8m full revol S18 Bike 8m  Full revol S18 10m FR S19     Ther Activity                Gait Training        TM        Modalities        CP w/ IFC R knee 15m  15 m 15 m 15 m 15 m

## 2021-05-03 ENCOUNTER — APPOINTMENT (OUTPATIENT)
Dept: PHYSICAL THERAPY | Facility: CLINIC | Age: 71
End: 2021-05-03
Payer: MEDICARE

## 2021-05-04 ENCOUNTER — OFFICE VISIT (OUTPATIENT)
Dept: PHYSICAL THERAPY | Facility: CLINIC | Age: 71
End: 2021-05-04
Payer: MEDICARE

## 2021-05-04 DIAGNOSIS — Z96.651 STATUS POST TOTAL RIGHT KNEE REPLACEMENT: Primary | ICD-10-CM

## 2021-05-04 DIAGNOSIS — M17.11 PRIMARY OSTEOARTHRITIS OF RIGHT KNEE: ICD-10-CM

## 2021-05-04 DIAGNOSIS — M24.661 ARTHROFIBROSIS OF KNEE JOINT, RIGHT: ICD-10-CM

## 2021-05-04 PROCEDURE — 97014 ELECTRIC STIMULATION THERAPY: CPT

## 2021-05-04 NOTE — PROGRESS NOTES
Daily Note     Today's date: 2021  Patient name: Zulay Parry  : 1950  MRN: [id number]  Referring provider: Uche uMnoz DO  Dx:   Encounter Diagnosis     ICD-10-CM    1  Status post total right knee replacement  Z96 651    2  Arthrofibrosis of knee joint, right  M24 661    3  Primary osteoarthritis of right knee  M17 11        Start Time: 1400  Stop Time: 1515  Total time in clinic (min): 75 minutes    Subjective: Pt states she is performing her HEP as directed  Objective: See treatment diary below      Assessment: Tolerated treatment well  Patient would benefit from continued PT      Plan: Continue per plan of care        Precautions: R TKR 2021  Date:    Pain: 3 2-3 3 3 3   Visit: 86 06 99 79 02   Manuals        PROM and jt mobilization R knee ds prom ds prom [facility]   Calf and Hs stretching ds ds [facility]   Neuro Re-Ed        Standing marching 3# 2/10 3# 2/10 3# 2 3# 2/10 3# 2/10   Standing hip abduction 3# 2/10 3# 2/10 3# 2/10 3# 2/10 3# 2/10   Step ups 8" 2/10 F 8" 1/10 8" 2/10  FWD, lat 8" 2/10  lat 8" 2/10 lat   Mini-squats 2/10 -----> 2/10 2/10 2/10   SLS 3x 15" 15" x3 3x 15" 3x 15" 3x 15"   Tandem stance 3x 15' 15" x3  3x 15" 15' x3 3x 15'   Toe-raises 3# 2/10 3# 2/15 3# 2/15 3# 30x 3# 2/10   Ther Ex        Quad sets 30 x 3"  30x 3" 30x 3" 30x 3" 30x 3"   SLR c hang(2) 3# 4/5 3# 4/5 3# 4/5 3# 4/5 3# 4/5   Supine hip abd L5 2/15 ----> L5 3/10 L5 3/10 L5 3/10   SAQ's 3# 2/15 3# 3/10 3# 3/10 3# 3/10 3# 3/10   Adductor squeezes 30x ----> 30x 3" 30x  30x   Hamstring curl-Uni 25# 2/15  20# SL 2/15 35# U 2/15 35# U  2/15 25#    2/15   LF knee extension 10#  2/15 3# 3/10 15# 2/15 15# 2/15 LF 10# 2/10   Leg Press 34# 3/10 30# 3/10 40# 3/10 40# 3/10 40#  3/10   Seated heel slides    ------> ---->   Bike 10m FR   S19 8m fr Bike 8m full revol S18 Bike 8m  Full revol S18 10m FR S19     Ther Activity                Gait Training        TM        Modalities        CP w/ IFC R knee 15m  HP 15 m 15 m 15 m 15 m

## 2021-05-06 ENCOUNTER — OFFICE VISIT (OUTPATIENT)
Dept: OBGYN CLINIC | Facility: CLINIC | Age: 71
End: 2021-05-06

## 2021-05-06 VITALS — BODY MASS INDEX: 24.86 KG/M2 | WEIGHT: 164 LBS | HEIGHT: 68 IN

## 2021-05-06 DIAGNOSIS — Z96.651 S/P TOTAL KNEE REPLACEMENT, RIGHT: Primary | ICD-10-CM

## 2021-05-06 PROCEDURE — 99024 POSTOP FOLLOW-UP VISIT: CPT | Performed by: ORTHOPAEDIC SURGERY

## 2021-05-06 NOTE — PROGRESS NOTES
Assessment/Plan:  Assessment/Plan   Diagnoses and all orders for this visit:    S/P total knee replacement, right        · Reviewed pertinent physical exam findings with patient at time of visit  All questions and concerns were addressed in detail  · Continue with formal therapy, 2x per week for next 4 weeks as per protocol  · Continue with HEP as directed by PT  · Continue NSAIDs/Tylenol as needed for pain and soreness  · Ice and elevate as needed  Return in 4 weeks (on 6/3/2021) for Re-evaluation  the patient is doing much better since the manipulation  Would recommend continuation of rehabilitation till she plateaus  Continue home exercise program   Continue stretching  Range of motion knee today was 0-100 degrees  At times in therapy, she get past 110°  Patient does understand that intraoperatively during her initial procedure, flexion was 120°  She is content with her results  Subjective:   Patient ID: Nikita Maier is a 78 y o  female  HPI    Patient presents 2 weeks s/p right knee manipulation under anesthesia following joint replacement surgery  Original procedure was performed 1/20/2021, and manipulation was performed 4/19/2021  Patient states that she has seen significant improvement in her range of motion following manipulation  She was consistent with formal physical therapy, daily, after manipulation, and has been consistent with PT as per knee replacement protocol since then  She reports occasional soreness, swelling, and pain after exercise  She denies any mechanical instability      The following portions of the patient's history were reviewed and updated as appropriate: allergies, current medications, past family history, past medical history, past social history, past surgical history and problem list     Past Medical History:   Diagnosis Date    Cardiac murmur due to mitral valve disorder     GERD (gastroesophageal reflux disease)     Hyperlipidemia     Hypertension  PONV (postoperative nausea and vomiting)     PVC's (premature ventricular contractions)      Past Surgical History:   Procedure Laterality Date    CARPAL TUNNEL RELEASE Bilateral     CATARACT EXTRACTION Bilateral     COLONOSCOPY      JOINT REPLACEMENT      KNEE SURGERY Right     PLANTAR'S WART EXCISION Right     AK MANIPULATN KNEE JT+ANESTHESIA Right 4/19/2021    Procedure: MANIPULATION JOINT KNEE;  Surgeon: Mamie Mcbride DO;  [facility] MAIN OR;  Service: Orthopedics    AK TOTAL KNEE ARTHROPLASTY Right 1/20/2021    Procedure: KNEE TOTAL ARTHROPLASTY;  Surgeon: Mamie Mcbride DO;  Location: [facility] MAIN OR;  Service: Orthopedics    TONSILLECTOMY      WRIST SURGERY Bilateral      Family History   Problem Relation Age of Onset    Heart disease Mother     Hypertension Mother     Hyperlipidemia Mother     Heart disease Father     Hypertension Father     Hyperlipidemia Father     Cancer Sister      Social History     Socioeconomic History    Marital status: /Civil Union     Spouse name: None    Number of children: None    Years of education: None    Highest education level: None   Occupational History    None   Social Needs    Financial resource strain: None    Food insecurity     Worry: None     Inability: None    Transportation needs     Medical: None     Non-medical: None   Tobacco Use    Smoking status: Never Smoker    Smokeless tobacco: Never Used   Substance and Sexual Activity    Alcohol use:  Yes     Alcohol/week: 2 0 standard drinks     Types: 1 Glasses of wine, 1 Standard drinks or equivalent per week     Frequency: 4 or more times a week     Comment: occasional    Drug use: Never    Sexual activity: Yes     Partners: Male   Lifestyle    Physical activity     Days per week: None     Minutes per session: None    Stress: None   Relationships    Social connections     Talks on phone: None     Gets together: None     Attends Gnosticism service: None     Active member of club or organization: None     Attends meetings of clubs or organizations: None     Relationship status: None    Intimate partner violence     Fear of current or ex partner: None     Emotionally abused: None     Physically abused: None     Forced sexual activity: None   Other Topics Concern    None   Social History Narrative    None       Current Outpatient Medications:     aspirin (ECOTRIN) 325 mg EC tablet, Take 1 tablet (325 mg total) by mouth daily, Disp: 30 tablet, Rfl: 0    Calcium Carbonate-Vitamin D (CALCIUM 600/VITAMIN D PO), Take by mouth tues/thursday, Disp: , Rfl:     Cholecalciferol (Vitamin D3) 50 MCG (2000 UT) TABS, Take 2,000 Units by mouth Monday/wednesday/friday, Disp: , Rfl:     magnesium oxide (MAG-OX) 400 mg, Take 400 mg by mouth daily, Disp: , Rfl:     metoprolol succinate (TOPROL-XL) 25 mg 24 hr tablet, Take 25 mg by mouth daily, Disp: , Rfl:     naproxen (NAPROSYN) 500 mg tablet, Take 1 tablet (500 mg total) by mouth 2 (two) times a day with meals, Disp: 180 tablet, Rfl: 2    Omega-3 Fatty Acids (Ultra Omega-3 Fish Oil) 1400 MG CAPS, Take by mouth, Disp: , Rfl:     omeprazole (PriLOSEC) 20 mg delayed release capsule, Take 20 mg by mouth as needed, Disp: , Rfl:     oxyCODONE-acetaminophen (PERCOCET) 5-325 mg per tablet, Take 1 tablet by mouth every 4 (four) hours as needed for moderate painMax Daily Amount: 6 tablets, Disp: 30 tablet, Rfl: 0    ascorbic acid (VITAMIN C) 500 MG tablet, Take 1 tablet (500 mg total) by mouth 2 (two) times a day, Disp: 60 tablet, Rfl: 1    Multiple Vitamins-Minerals (multivitamin with minerals) tablet, Take 1 tablet by mouth daily, Disp: 30 tablet, Rfl: 1    Allergies   Allergen Reactions    Levaquin [Levofloxacin] Hives    Cephalosporins Rash and Other (See Comments)     rash    Dilaudid [Hydromorphone] Nausea Only     And sweaty    Keflex [Cephalexin] Rash    Lansoprazole Other (See Comments)     rash    Penicillins Rash    Tetracycline Rash Review of Systems   Constitutional: Negative for fatigue  Gastrointestinal: Negative for nausea  Musculoskeletal:        As noted in HPI   Neurological: Negative for dizziness, weakness, numbness and headaches  All other systems reviewed and are negative  Objective:  Ht 5' 8" (1 727 m)   Wt 74 4 kg (164 lb)   BMI 24 94 kg/m²     Ortho Exam   Right Knee -   Weight-bearing status:  Incision: clean, dry, well healed - no drainage, no dehiscence   Skin is warm and dry with no signs of erythema, ecchymosis, or infection  Mild soft tissue swelling, no effusion  ROM: 0-95  Strength: 4+/5  Calf compartments are soft  2+ TP and DP pulses with brisk capillary refill to the toes  Sural, saphenous, tibial, superficial and deep peroneal motor and sensory distributions intact  Sensation light touch intact distally    Physical Exam  Constitutional:       Appearance: She is well-developed  HENT:      Right Ear: External ear normal       Left Ear: External ear normal       Nose: Nose normal    Eyes:      Conjunctiva/sclera: Conjunctivae normal       Pupils: Pupils are equal, round, and reactive to light  Neck:      Musculoskeletal: Normal range of motion  Pulmonary:      Effort: Pulmonary effort is normal    Musculoskeletal: Normal range of motion  Skin:     General: Skin is warm and dry  Neurological:      Mental Status: She is alert and oriented to person, place, and time  Psychiatric:         Behavior: Behavior normal          Thought Content:  Thought content normal          Judgment: Judgment normal          Imaging:  No new imaging reviewed this visit    Scribe Attestation    I,:  Ana Mendieta am acting as a scribe while in the presence of the attending physician :       I,:  Gladys Hennessy DO personally performed the services described in this documentation    as scribed in my presence :

## 2021-05-07 ENCOUNTER — OFFICE VISIT (OUTPATIENT)
Dept: PHYSICAL THERAPY | Facility: CLINIC | Age: 71
End: 2021-05-07
Payer: MEDICARE

## 2021-05-07 DIAGNOSIS — M24.661 ARTHROFIBROSIS OF KNEE JOINT, RIGHT: ICD-10-CM

## 2021-05-07 DIAGNOSIS — Z96.651 STATUS POST TOTAL RIGHT KNEE REPLACEMENT: Primary | ICD-10-CM

## 2021-05-07 DIAGNOSIS — M17.11 PRIMARY OSTEOARTHRITIS OF RIGHT KNEE: ICD-10-CM

## 2021-05-07 PROCEDURE — 97140 MANUAL THERAPY 1/> REGIONS: CPT | Performed by: PHYSICAL THERAPIST

## 2021-05-07 PROCEDURE — 97014 ELECTRIC STIMULATION THERAPY: CPT | Performed by: PHYSICAL THERAPIST

## 2021-05-07 PROCEDURE — 97110 THERAPEUTIC EXERCISES: CPT | Performed by: PHYSICAL THERAPIST

## 2021-05-07 NOTE — PROGRESS NOTES
Daily Note     Today's date: 2021  Patient name: Mitra Nassar  : 1950  MRN: [id number]  Referring provider: Michael Paz DO  Dx:   Encounter Diagnosis     ICD-10-CM    1  Status post total right knee replacement  Z96 651    2  Arthrofibrosis of knee joint, right  M24 661    3  Primary osteoarthritis of right knee  M17 11                   Subjective: Pt saw MD who is pleased with progress and recommends continued PT until ROM plateaus  Pain is 2-3 entering clinic  Pt also reports still using dynasplint at home and elliptical        Objective: See treatment diary below  R knee PROM flex 112 degrees      Assessment: Tolerated treatment well  Patient still with joint stiffness and tightness limiting flex and ext ROM  Pt continues to tolerate strengthening and balance exercises well with improved stability  Pt with increased step and stride length with more fluid gait pattern without limp  Plan: Continue per plan of care        Precautions: R TKR 2021  Date:    Pain: 3 2-3 3 3 3   Visit: 41 42 38 39 40   Manuals        PROM and jt mobilization R knee ds prom [facility] ds   [facility]s [facility]   Neuro Re-Ed        Standing marching 3# 2/10 3# 2/10 3# 2/20 3# 2/10 3# 2/10   Standing hip abduction 3# 2/10 3# 2/10 3# 2/10 3# 2/10 3# 2/10   Step ups 8" 2/10 F 8" 110 8" 2/10  FWD, lat 8" 2/10  lat 8" 2/10 lat   Mini-squats 2/10 2/10 2/10 2/10 2/10   SLS on mat 3x 15" 15" x3 3x 15" 3x 15" 3x 15"   Tandem stance on mat 3x 15' 15" x3  3x 15" 15' x3 3x 15'   Toe-raises 3# 2/10 3# 2/15 3# 2/15 3# 30x 3# 2/10   Ther Ex        Quad sets 30 x 3"  30x 3" 30x 3" 30x 3" 30x 3"   SLR c hang(2) 3# 4/5 3# 4/5 3# 4/5 3# 4/5 3# 4/5   Supine hip abd L5 2/15 L5 2/15 L5 3/10 L5 3/10 L5 3/10   SAQ's 3# 2/15 3# 3/10 3# 3/10 3# 3/10 3# 3/10   Adductor squeezes 30x x30 30x 3" 30x  30x   Hamstring curl-Uni 25# 2/15  25# SL 2/15 35# U 2/15 35# U  2/15 25#    2/15   LF knee extension 10#  2/15 10# 3/10 15# 2/15 15# 2/15 LF 10# 2/10   Leg Press 34# 3/10 40# 3/10 40# 3/10 40# 3/10 40#  3/10   Seated heel slides    ------> ---->   Bike 10m FR   S19 10m fr Bike 8m full revol S18 Bike 8m  Full revol S18 10m FR S19     Ther Activity                Gait Training        TM        Modalities        CP w/ IFC R knee 15m  HP 15 m 15 m 15 m 15 m

## 2021-05-11 ENCOUNTER — OFFICE VISIT (OUTPATIENT)
Dept: PHYSICAL THERAPY | Facility: CLINIC | Age: 71
End: 2021-05-11
Payer: MEDICARE

## 2021-05-11 DIAGNOSIS — M17.11 PRIMARY OSTEOARTHRITIS OF RIGHT KNEE: ICD-10-CM

## 2021-05-11 DIAGNOSIS — M24.661 ARTHROFIBROSIS OF KNEE JOINT, RIGHT: ICD-10-CM

## 2021-05-11 DIAGNOSIS — Z96.651 STATUS POST TOTAL RIGHT KNEE REPLACEMENT: Primary | ICD-10-CM

## 2021-05-11 PROCEDURE — 97014 ELECTRIC STIMULATION THERAPY: CPT

## 2021-05-11 PROCEDURE — 97140 MANUAL THERAPY 1/> REGIONS: CPT

## 2021-05-11 PROCEDURE — 97112 NEUROMUSCULAR REEDUCATION: CPT

## 2021-05-11 PROCEDURE — 97110 THERAPEUTIC EXERCISES: CPT

## 2021-05-11 NOTE — PROGRESS NOTES
Daily Note     Today's date: 2021  Patient name: Osmani Mina  : 1950  MRN: [id number]  Referring provider: Israel Ramírez DO  Dx:   Encounter Diagnosis     ICD-10-CM    1  Status post total right knee replacement  Z96 651    2  Arthrofibrosis of knee joint, right  M24 661    3  Primary osteoarthritis of right knee  M17 11        Start Time: 1400  Stop Time: 1515  Total time in clinic (min): 75 minutes    Subjective: Pt comments on increased stiffness this weekend  She states it's less stiff today  Objective: See treatment diary below      Assessment: Tolerated treatment well  Patient exhibited good technique with therapeutic exercises      Plan: Continue per plan of care        Precautions: R TKR 2021  Date:    Pain: 3 2-3 3 3 3   Visit: 16 70 33 61 34   Manuals        PROM and jt mobilization R knee ds prom [facility] CHARLES Ds prom [facility]   Calf and Hs stretching ds  ds  ds   Neuro Re-Ed        Standing marching 3# 2/10 3# 2/10 3# 2/20 3# 2/10 3# 2/10   Standing hip abduction 3# 2/10 3# 2/10 3# 2/10 3# 2/10 3# 2/10   Step ups F/ Lat 8" 2/10 F 8" 1/10 8" 2/10 8" 2/10  lat 8" 2/10 lat   Mini-squats 2/10 2/10 2/10 2/10 2/10   SLS on mat 3x 15" 15" x3 3x 15" 3x 15" 3x 15"   Tandem stance mat 3x 15' 15" x3  3x 15" 15' x3 3x 15'   Toe-raises 3# 2/10 3# 2/15 3# 2/15 3# 30x 3# 2/10   Ther Ex        Quad sets 30 x 3"  30x 3" 30x 3" 30x 3" 30x 3"   SLR c hang(2) 3# 4/5 3# 4/5 3# 4/5 3# 4/5 3# 4/5   Supine hip abd L5 2/15 L5 2/15 L5 3/10 L5 3/10 L5 3/10   SAQ's 3# 2/15 3# 3/10 3# 3/10 3# 3/10 3# 3/10   Adductor squeezes 30x x30 30x 3" 30x  30x   Hamstring curl-Uni 25# 2/15  25# SL 2/15 35#  2/15 35# U  2/15 25#    2/15   LF knee extension 10#  2/15 10# 3/10 15# 2/15 15# 2/15 LF 10# 2/10   Leg Press 40# 3/10 40# 3/10 50# 3/10 40# 3/10 40#  3/10   Seated heel slides    ------> ---->   Bike 10m FR   S19 10m fr 10m L0   full rev  10m FR S19     Ther Activity                Gait Training TM        Modalities        CP w/ IFC R knee 15m  HP 15 m 15m CP 15 m 15 m

## 2021-05-13 ENCOUNTER — OFFICE VISIT (OUTPATIENT)
Dept: PHYSICAL THERAPY | Facility: CLINIC | Age: 71
End: 2021-05-13
Payer: MEDICARE

## 2021-05-13 DIAGNOSIS — M17.11 PRIMARY OSTEOARTHRITIS OF RIGHT KNEE: ICD-10-CM

## 2021-05-13 DIAGNOSIS — Z96.651 STATUS POST TOTAL RIGHT KNEE REPLACEMENT: Primary | ICD-10-CM

## 2021-05-13 DIAGNOSIS — M24.661 ARTHROFIBROSIS OF KNEE JOINT, RIGHT: ICD-10-CM

## 2021-05-13 PROCEDURE — 97110 THERAPEUTIC EXERCISES: CPT | Performed by: PHYSICAL THERAPIST

## 2021-05-13 PROCEDURE — 97112 NEUROMUSCULAR REEDUCATION: CPT | Performed by: PHYSICAL THERAPIST

## 2021-05-13 PROCEDURE — 97014 ELECTRIC STIMULATION THERAPY: CPT | Performed by: PHYSICAL THERAPIST

## 2021-05-13 PROCEDURE — 97140 MANUAL THERAPY 1/> REGIONS: CPT | Performed by: PHYSICAL THERAPIST

## 2021-05-13 NOTE — PROGRESS NOTES
Daily Note     Today's date: 2021  Patient name: Marita Colin  : 1950  MRN: [id number]  Referring provider: Altagracia Perdue DO  Dx:   Encounter Diagnosis     ICD-10-CM    1  Status post total right knee replacement  Z96 651    2  Arthrofibrosis of knee joint, right  M24 661    3  Primary osteoarthritis of right knee  M17 11                   Subjective: Pt reports 3-/10 pain R knee  Pt notes she has been trying to increase weightbearing and activity at home  Objective: See treatment diary below  AROM ext -5 deg, AROM flex 101 PROM 110  Increased to 4# weight with standing and mat TE      Assessment: Tolerated treatment well  Patient exhibited good technique with therapeutic exercises  Pt still with stiffness tightness and pain at end-range flex and extension  Reviewed self stretching with patient at home into flexion and continued use of Grazyna splint to increase flexion ROM  Plan: Continue per plan of care        Precautions: R TKR 2021  Date:    Pain: 3 2-3 3 3-4 3   Visit: 27 64 11 38 40   Manuals        PROM and jt mobilization R knee ds prom [facility] CHARLES Ds prom  ds   Calf and Hs stretching ds  ds  ds   Neuro Re-Ed        Standing marching 3# 2/10 3# 2/10 3# 2/ 4# 2/10 3# 2/10   Standing hip abduction 3# 2/10 3# 2/10 3# 2/10 4# 2/10 3# 2/10   Step ups F/ Lat 8" 210 F 8" 10 8" 2/10 8" 210  Lat/FWD 8" 2/10 lat   Mini-squats 2/10 2/10 2/10 2/10 2/10   SLS on mat 3x 15" 15" x3 3x 15" 3x 15" 3x 15"   Tandem stance mat 3x 15' 15" x3  3x 15" 15' x3 3x 15'   Toe-raises 3# 2/10 3# 2/15 3# 2/15 4# 30x 3# 2/10   Ther Ex        Quad sets 30 x 3"  30x 3" 30x 3" 30x 3" 30x 3"   SLR c hang(2) 3# 4/5 3# 4/5 3# 4/5 3# 4/5 3# 4/5   Supine hip abd L5 2/15 L5 2/15 L5 3/10 L5 3/10 L5 3/10   SAQ's 3# 2/15 3# 3/10 3# 310 4# 3/10 3# 3/10   Adductor squeezes 30x x30 30x 3" 30x  30x   Hamstring curl-Uni 25# 2/15  25# SL 2/15 35#  2/15 35# U  2/15 25#    2/15   LF knee extension 10# 2/15 10# 3/10 15# 2/15 15# 2/15 LF 10# 2/10   Leg Press 40# 3/10 40# 3/10 50# 3/10 50# 3/10 40#  3/10   Seated heel slides    ------> ---->   Bike 10m FR   S19 10m fr 10m L0   full rev S17 10 min 10m FR S19     Ther Activity                Gait Training        TM        Modalities        CP w/ IFC R knee 15m  HP 15 m 15m CP 15 m 15 m

## 2021-05-18 ENCOUNTER — OFFICE VISIT (OUTPATIENT)
Dept: PHYSICAL THERAPY | Facility: CLINIC | Age: 71
End: 2021-05-18
Payer: MEDICARE

## 2021-05-18 DIAGNOSIS — M17.11 PRIMARY OSTEOARTHRITIS OF RIGHT KNEE: ICD-10-CM

## 2021-05-18 DIAGNOSIS — M24.661 ARTHROFIBROSIS OF KNEE JOINT, RIGHT: ICD-10-CM

## 2021-05-18 DIAGNOSIS — Z96.651 STATUS POST TOTAL RIGHT KNEE REPLACEMENT: Primary | ICD-10-CM

## 2021-05-18 PROCEDURE — 97112 NEUROMUSCULAR REEDUCATION: CPT | Performed by: PHYSICAL THERAPIST

## 2021-05-18 PROCEDURE — 97014 ELECTRIC STIMULATION THERAPY: CPT | Performed by: PHYSICAL THERAPIST

## 2021-05-18 PROCEDURE — 97110 THERAPEUTIC EXERCISES: CPT | Performed by: PHYSICAL THERAPIST

## 2021-05-18 PROCEDURE — 97140 MANUAL THERAPY 1/> REGIONS: CPT | Performed by: PHYSICAL THERAPIST

## 2021-05-18 NOTE — PROGRESS NOTES
Daily Note     Today's date: 2021  Patient name: Zulay Parry  : 1950  MRN: [id number]  Referring provider: Uche Munoz DO  Dx:   Encounter Diagnosis     ICD-10-CM    1  Status post total right knee replacement  Z96 651    2  Arthrofibrosis of knee joint, right  M24 661    3  Primary osteoarthritis of right knee  M17 11                   Subjective: Pt reports increased discomfort in R knee yesterday due to walking a lot  Pt notes pain is /10  Objective: See treatment diary below      Assessment: Tolerated treatment well  Patient continues to demonstrate good balance and stability with tandem stance and single leg stance  Pt demonstrating strength gains throughout R LE  Pt noting twinges and tightness at R IT band therefore instructed pt in IT band stretching  Pt verbalized understanding  Pt still requiring continued PT as she continues to demonstrate limitation in R knee flex and ext ROM  Plan: Continue per plan of care        Precautions: R TKR 2021  Date:    Pain: 3 2-3 3 3-4 4   Visit: 42 58 86 92 39   Manuals        PROM and jt mobilization R knee ds prom [facility] CHARLES Ds prom [facility]   Calf [facility] Hs stretching ds [facility]   Neuro Re-Ed        Standing marching 3# 2/10 3# 2/10 3# 2/20 4# 2/10 4# 2/10   Standing hip abduction 3# 2/10 3# 2/10 3# 2/10 4# 2/10 4# 2/10   Step ups F/ Lat 8" 2/10 F 8" 1/10 8" 2/10 8" 2/10  Lat/FWD 8" 2/10 lat   Mini-squats 2/10 2/10 2/10 2/10 2/10   SLS on mat 3x 15" 15" x3 3x 15" 3x 15" 3x 15"   Tandem stance mat 3x 15' 15" x3  3x 15" 15' x3 3x 15'   Toe-raises 3# 2/10 3# 2/15 3# 2/15 4# 30x 4# 2/10   Ther Ex        Quad sets 30 x 3"  30x 3" 30x 3" 30x 3" 30x 3"   SLR c hang(2) 3# 4/5 3# 4/5 3# 4/5 3# 4/5 3# 4/5   Supine hip abd L5 2/15 L5 2/15 L5 3/10 L5 3/10 L5 3/10   SAQ's 3# 2/15 3# 3/10 3# 3/10 4# 3/10 4# 3/10   Adductor squeezes 30x x30 30x 3" 30x  30x   Hamstring curl-Uni 25# 2/15  25# SL 2/15 35#  2/15 35# U  2/15 35#    2/15   LF knee extension 10#  2/15 10# 3/10 15# 2/15 15# 2/15 LF 15# 3/10   Leg Press 40# 3/10 40# 3/10 50# 3/10 50# 3/10 50#  3/10   Seated heel slides    ------> ---->   Bike 10m FR   S19 10m fr 10m L0   full rev S17 10 min 10m FR S17     Ther Activity                Gait Training        TM        Modalities        CP w/ IFC R knee 15m  HP 15 m 15m CP 15 m 15 m

## 2021-05-20 ENCOUNTER — OFFICE VISIT (OUTPATIENT)
Dept: PHYSICAL THERAPY | Facility: CLINIC | Age: 71
End: 2021-05-20
Payer: MEDICARE

## 2021-05-20 DIAGNOSIS — M24.661 ARTHROFIBROSIS OF KNEE JOINT, RIGHT: ICD-10-CM

## 2021-05-20 DIAGNOSIS — Z96.651 STATUS POST TOTAL RIGHT KNEE REPLACEMENT: Primary | ICD-10-CM

## 2021-05-20 DIAGNOSIS — M17.11 PRIMARY OSTEOARTHRITIS OF RIGHT KNEE: ICD-10-CM

## 2021-05-20 PROCEDURE — 97112 NEUROMUSCULAR REEDUCATION: CPT

## 2021-05-20 PROCEDURE — 97014 ELECTRIC STIMULATION THERAPY: CPT

## 2021-05-20 PROCEDURE — 97140 MANUAL THERAPY 1/> REGIONS: CPT

## 2021-05-20 PROCEDURE — 97110 THERAPEUTIC EXERCISES: CPT

## 2021-05-20 NOTE — PROGRESS NOTES
Daily Note     Today's date: 2021  Patient name: Brigette Deras  : 1950  MRN: [id number]  Referring provider: Teresa Johnson DO  Dx:   Encounter Diagnosis     ICD-10-CM    1  Status post total right knee replacement  Z96 651    2  Arthrofibrosis of knee joint, right  M24 661    3  Primary osteoarthritis of right knee  M17 11        Start Time: 1400  Stop Time: 1500  Total time in clinic (min): 60 minutes    Subjective: Pt states she is pleased with her progress  Objective: See treatment diary below      Assessment: Tolerated treatment well  Patient exhibited good technique with therapeutic exercises      Plan: Continue per plan of care        Precautions: R TKR 2021  Date:    Pain: 3 2-3 3 3-4 4   Visit: 38 03 94 68 31   Manuals        PROM and jt mobilization R knee ds prom [facility] CHARLES Ds prom [facility]   Calf and Hs stretching ds [facility] CHARLES ds [facility]   Neuro Re-Ed        Standing marching 4# 2/10 3# 2/10 3# 2/20 4# 2/10 4# 2/10   Standing hip abduction 4# 2/10 3# 2/10 3# 2/10 4# 2/10 4# 2/10   Step ups F/ Lat 8" 2/10 F 8" 1/10 8" 2/10 8" 2/10  Lat/FWD 8" 2/10 lat   Mini-squats 2/10 2/10 2/10 2/10 2/10   SLS on mat 3x 15" 15" x3 3x 15" 3x 15" 3x 15"   Tandem stance mat 3x 15' 15" x3  3x 15" 15' x3 3x 15'   Toe-raises 3# 2/10 3# 2/15 3# 2/15 4# 30x 4# 2/10   Ther Ex        Quad sets 30 x 3"  30x 3" 30x 3" 30x 3" 30x 3"   SLR c hang(2) 4# 4/5 3# 4/5 3# 4/5 3# 4/5 3# 4/5   Supine hip abd L5 2/15 L5 2/15 L5 3/10 L5 3/10 L5 3/10   SAQ's 4# 2/15 3# 3/10 3# 3/10 4# 3/10 4# 3/10   Adductor squeezes 30x x30 30x 3" 30x  30x   Hamstring curl-Uni ------> 25# SL 2/15 35#  2/15 35# U  2/15 35#    2/15   LF knee extension -------> 10# 3/10 15# 2/15 15# 2/15 LF 15# 3/10   Leg Press ----> 40# 3/10 50# 3/10 50# 3/10 50#  3/10   Seated heel slides    ------> ---->   Bike 10m  10m fr 10m L0   full rev S17 10 min 10m FR S17     Ther Activity                Gait Training        TM        Modalities        CP w/ IFC R knee 15m  CP 15 m 15m CP 15 m 15 m

## 2021-05-25 ENCOUNTER — OFFICE VISIT (OUTPATIENT)
Dept: PHYSICAL THERAPY | Facility: CLINIC | Age: 71
End: 2021-05-25
Payer: MEDICARE

## 2021-05-25 DIAGNOSIS — M17.11 PRIMARY OSTEOARTHRITIS OF RIGHT KNEE: ICD-10-CM

## 2021-05-25 DIAGNOSIS — M24.661 ARTHROFIBROSIS OF KNEE JOINT, RIGHT: Primary | ICD-10-CM

## 2021-05-25 PROCEDURE — 97014 ELECTRIC STIMULATION THERAPY: CPT

## 2021-05-25 PROCEDURE — 97110 THERAPEUTIC EXERCISES: CPT

## 2021-05-25 PROCEDURE — 97140 MANUAL THERAPY 1/> REGIONS: CPT

## 2021-05-25 PROCEDURE — 97112 NEUROMUSCULAR REEDUCATION: CPT

## 2021-05-25 NOTE — PROGRESS NOTES
Daily Note     Today's date: 2021  Patient name: Les Brady  : 1950  MRN: [id number]  Referring provider: Jose Elias Duff DO  Dx:   Encounter Diagnosis     ICD-10-CM    1  Arthrofibrosis of knee joint, right  M24 661    2  Primary osteoarthritis of right knee  M17 11        Start Time: 1300  Stop Time: 1400  Total time in clinic (min): 60 minutes    Subjective:Pt notes min c/o with her R knee  Objective: See treatment diary below      Assessment: Tolerated treatment well  Patient exhibited good technique with therapeutic exercises      Plan: Continue per plan of care        Precautions: R TKR 2021  Date:    Pain: 3 2 3 3-4 4   Visit: 49 42 61 75 37   Manuals        PROM and jt mobilization R knee ds prom ds Ds prom [facility]   Calf and Hs stretching ds ds ds [employer] [facility]   Neuro Re-Ed        Standing marching 4# 2/10 3# 2/10 3# 2 4# 2/10 4# 2/10   Standing hip abduction 4# 2/10 3# 2/10 3# 2/10 4# 2/10 4# 2/10   Step ups F/ Lat 8" 2/10 F ----> 8" 2/10 8" 2/10  Lat/FWD 8" 2/10 lat   Mini-squats 2/10 2/10 2/10 2/10 2/10   SLS on mat 3x 15" 15" x3 3x 15" 3x 15" 3x 15"   Tandem stance mat 3x 15' 15" x3  3x 15" 15' x3 3x 15'   Toe-raises 3# 2/10 3# 2/15 3# 2/15 4# 30x 4# 2/10   Ther Ex        Quad sets 30 x 3"  30x 3" 30x 3" 30x 3" 30x 3"   SLR c hang(2) 4# 4/5 3# 4/5 3# 4/5 3# 4/5 3# 4/5   Supine hip abd L5 2/15 L5 2/15 L5 3/10 L5 3/10 L5 3/10   SAQ's 4# 2/15 3# 3/10 3# 3/10 4# 3/10 4# 3/10   Adductor squeezes 30x x30 30x 3" 30x  30x   Hamstring curl-Uni 30# 2/15 30# 2/15 35#  2/15 35# U  2/15 35#    2/15   LF knee extension 15# 2/15 15# 3/10 15# 2/15 15# 2/15  15# 2/15   Leg Press/toe raise ----> 40# 3/10 50# 3/10 50# 3/10 50#  3/10   Seated heel slides    ------> ---->   Bike 10m  10m fr 10m L0   full rev S17 10 min 10m FR S17     Ther Activity                Gait Training        TM        Modalities        CP w/ IFC R knee 15m  CP 15 m cp 15m CP 15 m 15 m

## 2021-05-27 ENCOUNTER — OFFICE VISIT (OUTPATIENT)
Dept: PHYSICAL THERAPY | Facility: CLINIC | Age: 71
End: 2021-05-27
Payer: MEDICARE

## 2021-05-27 DIAGNOSIS — Z96.651 STATUS POST TOTAL RIGHT KNEE REPLACEMENT: ICD-10-CM

## 2021-05-27 DIAGNOSIS — M17.11 PRIMARY OSTEOARTHRITIS OF RIGHT KNEE: ICD-10-CM

## 2021-05-27 DIAGNOSIS — M24.661 ARTHROFIBROSIS OF KNEE JOINT, RIGHT: Primary | ICD-10-CM

## 2021-05-27 PROCEDURE — 97112 NEUROMUSCULAR REEDUCATION: CPT | Performed by: PHYSICAL THERAPIST

## 2021-05-27 PROCEDURE — 97140 MANUAL THERAPY 1/> REGIONS: CPT | Performed by: PHYSICAL THERAPIST

## 2021-05-27 PROCEDURE — 97014 ELECTRIC STIMULATION THERAPY: CPT | Performed by: PHYSICAL THERAPIST

## 2021-05-27 PROCEDURE — 97110 THERAPEUTIC EXERCISES: CPT | Performed by: PHYSICAL THERAPIST

## 2021-05-27 NOTE — PROGRESS NOTES
PT Re-Evaluation     Today's date: 2021  Patient name: Emelia Church  : 1950  MRN: [id number]  Referring provider: Maria Isabel Jauregui DO  Dx:   Encounter Diagnosis     ICD-10-CM    1  Arthrofibrosis of knee joint, right  M24 661    2  Primary osteoarthritis of right knee  M17 11    3  Status post total right knee replacement  Z96 651                   Assessment  Assessment details: Pt is a 78year old female presenting to Outpatient PT s/p R Total knee replacement on 2021  Pt has been seen for 48 visits of Outpatient PT with treatment consisting of joint mobilization,  and soft tissue mobilization to decrease tightness and spasm, manual stretching into flex and ext, AROM, strengthening balance and proprioception exercises with CP and IFC  Pt continues to demonstrate improvement in AROM and PROM into R knee flexion with further improvement in R knee strength and improvement in gait pattern with decreased circumduction, decreased limp, and full WB R LE  Pt continues to increase ambulation tolerance and distance with less frequent increases in R knee pain  Pt still with limitation in flexion ROM both actively and passively limiting ability to perform squatting bending and descending stairs  Pt therefore would benefit from continued PT to further improve ROM needed for normalization of gait, stair negotiation and return to PLOF  Impairments: abnormal gait, abnormal or restricted ROM, activity intolerance, impaired physical strength and pain with function  Functional limitations: prolonged standing, walking, stair negotiation, bending, squatting  Symptom irritability: lowUnderstanding of Dx/Px/POC: good   Prognosis: good    Goals  STG's once pt is post op to be met in 3-4 weeks  1  Decrease pain by 25% at worst with activity- progressing not met  2  Improve R knee AROM to at least 100 deg flex, -3 degrees extension- partially met  3  Improve R hip knee and ankle strength to within 10 lbs of L- met  4  Improve standing/walking tolerance to 30-45 minutes - met  5  Pt will sleep through night without waking due to pain- not met    LTG's to be met in 12 weeks  1  Decrease pain to 2/10 at worst with activity- not met  2  Improve ROM to at least 115 degrees flex, 0 degrees extension- progressing not met  3  Improve R hip knee and ankle strength to within 3-5 lbs of L- partially met  4  Improve step negotiation to reciprocal no LOB or instability- partially met  5  Improve standing and walking tolerance to at least 1 hour - met  6  Decrease swelling R knee by 1 cm-  met  7  Independent with HEP- ongoing and progressing  8  Improve Foto score to at least 60%- met    Plan  Patient would benefit from: skilled physical therapy  Planned modality interventions: unattended electrical stimulation and cryotherapy  Planned therapy interventions: joint mobilization, manual therapy, home exercise program, therapeutic exercise, stretching, strengthening, patient education, gait training, flexibility, therapeutic activities and balance  Frequency: 2x week  Duration in weeks: 4  Plan of Care beginning date: 2021  Plan of Care expiration date: 2021  Treatment plan discussed with: patient        Subjective Evaluation    History of Present Illness  Date of surgery: 2021  Mechanism of injury: surgery  Mechanism of injury: Pt reports continued improvement in R knee  Pt reports she is now able to get up from floor and chair with greater ease  Pt reports improved ability to ascend stairs and descending is still difficult  Pt reports she continues to stretch at home     Quality of life: good    Pain  Current pain ratin  At best pain ratin  At worst pain ratin (after stretching)  Location: R knee (lateral and medial knee)  Relieving factors: ice, rest and medications  Aggravating factors: stair climbing, walking and standing    Treatments  Previous treatment: injection treatment  Current treatment: physical therapy  Patient Goals  Patient goals for therapy: decreased pain, decreased edema, increased motion, increased strength, independence with ADLs/IADLs, return to sport/leisure activities and improved balance  Patient goal: return to 2-4 mile walks- progressing        Objective     Observations     Additional Observation Details  Antalgic gait pattern with lack of R knee flex during ambulation with SPC- pt ambulating without A  D  without circumduction but still demonstrates mild limp with decreased step length but is normalizing      Neurological Testing     Sensation     Knee   Left Knee   Intact: light touch    Right Knee   Intact: light touch     Active Range of Motion   Left Knee   Flexion: 115 degrees   Extension: 0 degrees     Right Knee   Flexion: 105 degrees   Extension: -4 degrees     Additional Active Range of Motion Details  Pt demonstrating weak quad set due to increase swelling- quad set returned to norm  (I) SLR no quad lag    Passive Range of Motion   Left Knee   Flexion: 120 degrees   Extension: 0 degrees     Right Knee   Flexion: 113 degrees   Extension: 0 degrees     Strength/Myotome Testing     Additional Strength Details                    R                  L  Hip       Flex      24 lbs          24 lbs      Abd      42 lbs          38 lbs      Add      33 lbs          27 lbs  Knee      Ext        42 lbs         44 lbs     Flex        38 lbs         33 lbs  Ankle       DF        23 lbs           21 lbs       PF        65 lbs           42 lbs    General Comments:      Knee Comments  Girth:                 R                  suprapat         42 3 cm       Mid joint          41 0 cm    Stair negotiation is with step to pattern due to pain- stair negotiation has progressed to reciprocal but needs 2 HR's  Standing tolerance 30 minutes with increased pain- improved to 1 HR 30 minutes due to increased soreness  Walking tolerance 1 1/2 blocks with SPC limited by pain- Walking tolerance no A D    Increased to 1 5 mile  Difficulty with donning shoes and socks requiring bending at waist-improved but still has to modify technique  Assist with bathing/showering- (I) with bathing and showering  Pt unable to kneel or squat- attempted to kneel but pain present, able to partially squat  Sleep disturbed by pain nightly with sleep tolerance 3 hours- sleep still disturbed nightly with sleep tolerance 3-4 hours  Foto- 43- improved to 69             Precautions: R TKR 1/20/2021  Date: 5/20 5/25 5/27 5/13 5/18   Pain: 3 2 3 3-4 4   Visit: 56 74 23 09 08   Manuals        PROM and jt mobilization R knee ds prom [facility]   Calf and Hs stretching ds [facility]   Neuro Re-Ed        Standing marching 4# 2/10 3# 2/10 3# 2/20 4# 2/10 4# 2/10   Standing hip abduction 4# 2/10 3# 2/10 3# 2/10 4# 2/10 4# 2/10   Step ups F/ Lat 8" 2/10 F ----> 8" 2/10 8" 2/10  Lat/FWD 8" 2/10 lat   Mini-squats 2/10 2/10 2/10 2/10 2/10   SLS on mat 3x 15" 15" x3 3x 15" 3x 15" 3x 15"   Tandem stance mat 3x 15' 15" x3  3x 15" 15' x3 3x 15'   Toe-raises 3# 2/10 3# 2/15 3# 2/15 4# 30x 4# 2/10   Ther Ex        Quad sets 30 x 3"  30x 3" 30x 3" 30x 3" 30x 3"   SLR c hang(2) 4# 4/5 3# 4/5 3# 4/5 3# 4/5 3# 4/5   Supine hip abd L5 2/15 L5 2/15 L5 3/10 L5 3/10 L5 3/10   SAQ's 4# 2/15 3# 3/10 3# 3/10 4# 3/10 4# 3/10   Adductor squeezes 30x x30 30x 3" 30x  30x   Hamstring curl-Uni 30# 2/15 30# 2/15 35#  2/15 35# U  2/15 35#    2/15   LF knee extension 15# 2/15 15# 3/10 30# 2/15 15# 2/15  15# 2/15   Leg Press/toe raise ----> 40# 3/10 50# 3/10 50# 3/10 50#  3/10   Seated heel slides    ------> ---->   Bike 10m  10m fr 10m L0   full rev S17 10 min 10m FR S17     Ther Activity                Gait Training        TM        Modalities        CP w/ IFC R knee 15m  CP 15 m cp 15m CP 15 m 15 m

## 2021-05-27 NOTE — LETTER
May 27, 2021    Roxanne Russell DO  [address]    Patient: Marita Colin   YOB: 1950   Date of Visit: 2021     Encounter Diagnosis     ICD-10-CM    1  Arthrofibrosis of knee joint, right  M24 661    2  Primary osteoarthritis of right knee  M17 11    3  Status post total right knee replacement  Z96 651        Dear Dr Elias Most:    Thank you for your recent referral of Marita Colin  Please review the attached evaluation summary from [address] [place] [address] recent visit  Please verify that you agree with the plan of care by signing the attached order  If you have any questions or concerns, please do not hesitate to call  I sincerely appreciate the opportunity to share in the care of one of your patients and hope to have another opportunity to work with you in the near future  Sincerely,    Jerrell Sood, PT      Referring Provider:      I certify that I have read the below Plan of Care and certify the need for these services furnished under this plan of treatment while under my care  Roxanne Russell DO  [address] [place] [address] Dr Patricia In [place]          PT Re-Evaluation     Today's date: 2021  Patient name: Marita Colin  : 1950  MRN: [id number]  Referring provider: Altagracia Perdue DO  Dx:   Encounter Diagnosis     ICD-10-CM    1  Arthrofibrosis of knee joint, right  M24 661    2  Primary osteoarthritis of right knee  M17 11    3  Status post total right knee replacement  Z96 651                   Assessment  Assessment details: Pt is a 78year old female presenting to Outpatient PT s/p R Total knee replacement on 2021  Pt has been seen for 48 visits of Outpatient PT with treatment consisting of joint mobilization,  and soft tissue mobilization to decrease tightness and spasm, manual stretching into flex and ext, AROM, strengthening balance and proprioception exercises with CP and IFC   Pt continues to demonstrate improvement in AROM and PROM into R knee flexion with further improvement in R knee strength and improvement in gait pattern with decreased circumduction, decreased limp, and full WB R LE  Pt continues to increase ambulation tolerance and distance with less frequent increases in R knee pain  Pt still with limitation in flexion ROM both actively and passively limiting ability to perform squatting bending and descending stairs  Pt therefore would benefit from continued PT to further improve ROM needed for normalization of gait, stair negotiation and return to PLOF  Impairments: abnormal gait, abnormal or restricted ROM, activity intolerance, impaired physical strength and pain with function  Functional limitations: prolonged standing, walking, stair negotiation, bending, squatting  Symptom irritability: lowUnderstanding of Dx/Px/POC: good   Prognosis: good    Goals  STG's once pt is post op to be met in 3-4 weeks  1  Decrease pain by 25% at worst with activity- progressing not met  2  Improve R knee AROM to at least 100 deg flex, -3 degrees extension- partially met  3  Improve R hip knee and ankle strength to within 10 lbs of L- met  4  Improve standing/walking tolerance to 30-45 minutes - met  5  Pt will sleep through night without waking due to pain- not met    LTG's to be met in 12 weeks  1  Decrease pain to 2/10 at worst with activity- not met  2  Improve ROM to at least 115 degrees flex, 0 degrees extension- progressing not met  3  Improve R hip knee and ankle strength to within 3-5 lbs of L- partially met  4  Improve step negotiation to reciprocal no LOB or instability- partially met  5  Improve standing and walking tolerance to at least 1 hour - met  6  Decrease swelling R knee by 1 cm-  met  7  Independent with HEP- ongoing and progressing  8   Improve Foto score to at least 60%- met    Plan  Patient would benefit from: skilled physical therapy  Planned modality interventions: unattended electrical stimulation and cryotherapy  Planned therapy interventions: joint mobilization, manual therapy, home exercise program, therapeutic exercise, stretching, strengthening, patient education, gait training, flexibility, therapeutic activities and balance  Frequency: 2x week  Duration in weeks: 4  Plan of Care beginning date: 2021  Plan of Care expiration date: 2021  Treatment plan discussed with: patient        Subjective Evaluation    History of Present Illness  Date of surgery: 2021  Mechanism of injury: surgery  Mechanism of injury: Pt reports continued improvement in R knee  Pt reports she is now able to get up from floor and chair with greater ease  Pt reports improved ability to ascend stairs and descending is still difficult  Pt reports she continues to stretch at home  Quality of life: good    Pain  Current pain ratin  At best pain ratin  At worst pain ratin (after stretching)  Location: R knee (lateral and medial knee)  Relieving factors: ice, rest and medications  Aggravating factors: stair climbing, walking and standing    Treatments  Previous treatment: injection treatment  Current treatment: physical therapy  Patient Goals  Patient goals for therapy: decreased pain, decreased edema, increased motion, increased strength, independence with ADLs/IADLs, return to sport/leisure activities and improved balance  Patient goal: return to 2-4 mile walks- progressing        Objective     Observations     Additional Observation Details  Antalgic gait pattern with lack of R knee flex during ambulation with SPC- pt ambulating without A  D  without circumduction but still demonstrates mild limp with decreased step length but is normalizing      Neurological Testing     Sensation     Knee   Left Knee   Intact: light touch    Right Knee   Intact: light touch     Active Range of Motion   Left Knee   Flexion: 115 degrees   Extension: 0 degrees     Right Knee   Flexion: 105 degrees Extension: -4 degrees     Additional Active Range of Motion Details  Pt demonstrating weak quad set due to increase swelling- quad set returned to norm  (I) SLR no quad lag    Passive Range of Motion   Left Knee   Flexion: 120 degrees   Extension: 0 degrees     Right Knee   Flexion: 113 degrees   Extension: 0 degrees     Strength/Myotome Testing     Additional Strength Details                    R                  L  Hip       Flex      24 lbs          24 lbs      Abd      42 lbs          38 lbs      Add      33 lbs          27 lbs  Knee      Ext        42 lbs         44 lbs     Flex        38 lbs         33 lbs  Ankle       DF        23 lbs           21 lbs       PF        65 lbs           42 lbs    General Comments:      Knee Comments  Girth:                 R                  suprapat         42 3 cm       Mid joint          41 0 cm    Stair negotiation is with step to pattern due to pain- stair negotiation has progressed to reciprocal but needs 2 HR's  Standing tolerance 30 minutes with increased pain- improved to 1 HR 30 minutes due to increased soreness  Walking tolerance 1 1/2 blocks with SPC limited by pain- Walking tolerance no A D    Increased to 1 5 mile  Difficulty with donning shoes and socks requiring bending at waist-improved but still has to modify technique  Assist with bathing/showering- (I) with bathing and showering  Pt unable to kneel or squat- attempted to kneel but pain present, able to partially squat  Sleep disturbed by pain nightly with sleep tolerance 3 hours- sleep still disturbed nightly with sleep tolerance 3-4 hours  Foto- 43- improved to 69             Precautions: R TKR 1/20/2021  Date: 5/20 5/25 5/27 5/13 5/18   Pain: 3 2 3 3-4 4   Visit: 46 47 48 44 45   Manuals        PROM and jt mobilization R knee ds prom [facility]   Calf and Hs stretching ds [facility]   Neuro Re-Ed        Standing marching 4# 2/10 3# 2/10 3# 2/20 4# 2/10 4# 2/10   Standing hip abduction 4# 2/10 3# 2/10 3# 2/10 4# 2/10 4# 2/10   Step ups F/ Lat 8" 2/10 F ----> 8" 2/10 8" 2/10  Lat/FWD 8" 2/10 lat   Mini-squats 2/10 2/10 2/10 2/10 2/10   SLS on mat 3x 15" 15" x3 3x 15" 3x 15" 3x 15"   Tandem stance mat 3x 15' 15" x3  3x 15" 15' x3 3x 15'   Toe-raises 3# 2/10 3# 2/15 3# 2/15 4# 30x 4# 2/10   Ther Ex        Quad sets 30 x 3"  30x 3" 30x 3" 30x 3" 30x 3"   SLR c hang(2) 4# 4/5 3# 4/5 3# 4/5 3# 4/5 3# 4/5   Supine hip abd L5 2/15 L5 2/15 L5 3/10 L5 3/10 L5 3/10   SAQ's 4# 2/15 3# 3/10 3# 3/10 4# 3/10 4# 3/10   Adductor squeezes 30x x30 30x 3" 30x  30x   Hamstring curl-Uni 30# 2/15 30# 2/15 35#  2/15 35# U  2/15 35#    2/15   LF knee extension 15# 2/15 15# 3/10 30# 2/15 15# 2/15  15# 2/15   Leg Press/toe raise ----> 40# 3/10 50# 3/10 50# 3/10 50#  3/10   Seated heel slides    ------> ---->   Bike 10m  10m fr 10m L0   full rev S17 10 min 10m FR S17     Ther Activity                Gait Training        TM        Modalities        CP w/ IFC R knee 15m  CP 15 m cp 15m CP 15 m 15 m

## 2021-06-01 ENCOUNTER — OFFICE VISIT (OUTPATIENT)
Dept: PHYSICAL THERAPY | Facility: CLINIC | Age: 71
End: 2021-06-01
Payer: MEDICARE

## 2021-06-01 DIAGNOSIS — Z96.651 STATUS POST TOTAL RIGHT KNEE REPLACEMENT: ICD-10-CM

## 2021-06-01 DIAGNOSIS — M17.11 PRIMARY OSTEOARTHRITIS OF RIGHT KNEE: ICD-10-CM

## 2021-06-01 DIAGNOSIS — M24.661 ARTHROFIBROSIS OF KNEE JOINT, RIGHT: Primary | ICD-10-CM

## 2021-06-01 PROCEDURE — 97112 NEUROMUSCULAR REEDUCATION: CPT | Performed by: PHYSICAL THERAPIST

## 2021-06-01 PROCEDURE — 97014 ELECTRIC STIMULATION THERAPY: CPT | Performed by: PHYSICAL THERAPIST

## 2021-06-01 PROCEDURE — 97110 THERAPEUTIC EXERCISES: CPT | Performed by: PHYSICAL THERAPIST

## 2021-06-01 PROCEDURE — 97140 MANUAL THERAPY 1/> REGIONS: CPT | Performed by: PHYSICAL THERAPIST

## 2021-06-01 NOTE — PROGRESS NOTES
Daily Note     Today's date: 2021  Patient name: Zulay Parry  : 1950  MRN: [id number]  Referring provider: Uche Munoz DO  Dx:   Encounter Diagnosis     ICD-10-CM    1  Arthrofibrosis of knee joint, right  M24 661    2  Primary osteoarthritis of right knee  M17 11    3  Status post total right knee replacement  Z96 651                   Subjective: Pt reports doing her exercises and having her  stretching into extension over the weekend  Pain today rated 3/10  Objective: See treatment diary below  TE directed per grid  Gait is with increased stride length but still lacks full extension at terminal swing  Assessment: Tolerated treatment well  Patient still continues with stiffness and decreased ROM into flexion and extension but pt continues to make slow steady progress  Tightness and soreness still noted lateral knee with stretching  Decreased stiffness post session  Plan: Continue per plan of care        Precautions: R TKR 2021  Date: 21   Pain: 3 2 3 3 4   Visit: 91 43 82 57 52   Manuals        PROM and jt mobilization R knee ds prom [facility]   Calf and Hs stretching ds ds [facility]   Neuro Re-Ed        Standing marching 4# 2/10 3# 2/10 3# 2 4# 2/10 4# 2/10   Standing hip abduction 4# 2/10 3# 2/10 3# 2/10 4# 2/10 4# 2/10   Step ups F/ Lat 8" 210 F ----> 8" 2/10 8" 2/10  Lat/FWD 8" 2/10 lat   Mini-squats 210 2/10 2/10 2/10 2/10   SLS on mat 3x 15" 15" x3 3x 15" 3x 15" 3x 15"   Tandem stance mat 3x 15' 15" x3  3x 15" 15' x3 3x 15'   Toe-raises 3# 2/10 3# 2/15 3# 2/15 4# 30x 4# 2/10   Ther Ex        Quad sets 30 x 3"  30x 3" 30x 3" 30x 3" 30x 3"   SLR c hang(2) 4# 4/5 3# 4/5 3# 4/5 4# 4/5 3# 4/5   Supine hip abd L5 2/15 L5 2/15 L5 3/10 L5 3/10 L5 3/10   SAQ's 4# 2/15 3# 3/10 3# 3/10 4# 3/10 4# 3/10   Adductor squeezes 30x x30 30x 3" 30x  30x   Hamstring curl-Uni 30# 2/15 30# 2/15 35#  2/15 35# U  2/15 35#    2/15   LF knee extension 15# 2/15 15# 3/10 30# 2/15 30# 2/15  15# 2/15   Leg Press/toe raise ----> 40# 3/10 50# 3/10 50# 3/10 50#  3/10   Seated heel slides    ------> ---->   Bike 10m  10m fr 10m L0   full rev S13 10 min 10m FR S17     Ther Activity                Gait Training        TM        Modalities        CP w/ IFC R knee 15m  CP 15 m cp 15m CP 15 m 15 m

## 2021-06-02 ENCOUNTER — OFFICE VISIT (OUTPATIENT)
Dept: PHYSICAL THERAPY | Facility: CLINIC | Age: 71
End: 2021-06-02
Payer: MEDICARE

## 2021-06-02 DIAGNOSIS — M24.661 ARTHROFIBROSIS OF KNEE JOINT, RIGHT: Primary | ICD-10-CM

## 2021-06-02 DIAGNOSIS — M17.11 PRIMARY OSTEOARTHRITIS OF RIGHT KNEE: ICD-10-CM

## 2021-06-02 DIAGNOSIS — Z96.651 STATUS POST TOTAL RIGHT KNEE REPLACEMENT: ICD-10-CM

## 2021-06-02 PROCEDURE — 97112 NEUROMUSCULAR REEDUCATION: CPT

## 2021-06-02 PROCEDURE — 97140 MANUAL THERAPY 1/> REGIONS: CPT

## 2021-06-02 PROCEDURE — 97110 THERAPEUTIC EXERCISES: CPT

## 2021-06-02 PROCEDURE — 97014 ELECTRIC STIMULATION THERAPY: CPT

## 2021-06-02 NOTE — PROGRESS NOTES
Daily Note     Today's date: 6/3/2021  Patient name: Naresh Elliott  : 1950  MRN: [id number]  Referring provider: Rickie Siemens, DO  Dx:   Encounter Diagnosis     ICD-10-CM    1  Arthrofibrosis of knee joint, right  M24 661    2  Primary osteoarthritis of right knee  M17 11    3  Status post total right knee replacement  Z96 651        Start Time: 1100  Stop Time: 1200  Total time in clinic (min): 60 minutes    Subjective: Pt reports mild persisting medial knee soreness  Objective: See treatment diary below      Assessment: Tolerated treatment well  Patient exhibited good technique with therapeutic exercises      Plan: Continue per plan of care        Precautions: R TKR 2021  Date: 21 6   Pain: 3 2 3 3 3   Visit: 50 26 91 01 19   Manuals        PROM and jt mobilization R knee ds prom [facility]   Calf and Hs stretching ds ds [facility] ds   Neuro Re-Ed        Standing marching 4# 2/10 3# 2/10 3# 2 4# 2/10 4# 2/10   Standing hip abduction 4# 2/10 3# 2/10 3# 2/10 4# 2/10 4# 2/10   Step ups F/ Lat 8" 2/10 F ----> 8" 2/10 8" 2/10  Lat/FWD 8" 2/10    Mini-squats 2/10 2/10 2/10 2/10 2/10   SLS on mat 3x 15" 15" x3 3x 15" 3x 15" 3x 15"   Tandem stance mat 3x 15' 15" x3  3x 15" 15' x3 3x 15'   Toe-raises 3# 2/10 3# 2/15 3# 2/15 4# 30x 4# 2/10   Ther Ex        Quad sets 30 x 3"  30x 3" 30x 3" 30x 3" 30x 3"   SLR c hang(2) 4# 4/5 3# 4/5 3# 4/5 4# 4/5 3# 4/5   Supine hip abd L5 2/15 L5 2/15 L5 3/10 L5 3/10 L5 3/10   SAQ's 4# 2/15 3# 3/10 3# 3/10 4# 3/10 4# 2/15   Adductor squeezes 30x x30 30x 3" 30x  30x   Hamstring curl-Uni 30# 2/15 30# 2/15 35#  2/15 35# U  2/15 40# B   2/15   LF knee extension 15# 2/15 15# 3/10 30# 2/15 30# 2/15 30#  2/15   Leg Press/toe raise ----> 40# 3/10 50# 3/10 50# 3/10 50#  2/15   Seated heel slides    ------> ---->   Bike 10m  10m fr 10m L0   full rev S13 10 min 10m     Ther Activity                Gait Training        TM        Modalities        CP w/ IFC R knee 15m  CP 15 m cp 15m CP 15 m 15 m

## 2021-06-04 ENCOUNTER — APPOINTMENT (OUTPATIENT)
Dept: PHYSICAL THERAPY | Facility: CLINIC | Age: 71
End: 2021-06-04
Payer: MEDICARE

## 2021-06-07 ENCOUNTER — OFFICE VISIT (OUTPATIENT)
Dept: PHYSICAL THERAPY | Facility: CLINIC | Age: 71
End: 2021-06-07
Payer: MEDICARE

## 2021-06-07 DIAGNOSIS — M17.11 PRIMARY OSTEOARTHRITIS OF RIGHT KNEE: ICD-10-CM

## 2021-06-07 DIAGNOSIS — M24.661 ARTHROFIBROSIS OF KNEE JOINT, RIGHT: Primary | ICD-10-CM

## 2021-06-07 DIAGNOSIS — Z96.651 STATUS POST TOTAL RIGHT KNEE REPLACEMENT: ICD-10-CM

## 2021-06-07 PROCEDURE — 97014 ELECTRIC STIMULATION THERAPY: CPT

## 2021-06-07 PROCEDURE — 97140 MANUAL THERAPY 1/> REGIONS: CPT

## 2021-06-07 PROCEDURE — 97112 NEUROMUSCULAR REEDUCATION: CPT

## 2021-06-07 PROCEDURE — 97110 THERAPEUTIC EXERCISES: CPT

## 2021-06-07 NOTE — PROGRESS NOTES
Daily Note     Today's date: 2021  Patient name: Simone Silva  : 1950  MRN: [id number]  Referring provider: Ankur Bravo DO  Dx:   Encounter Diagnosis     ICD-10-CM    1  Arthrofibrosis of knee joint, right  M24 661    2  Primary osteoarthritis of right knee  M17 11    3  Status post total right knee replacement  Z96 651        Start Time: 1100  Stop Time: 1215  Total time in clinic (min): 75 minutes    Subjective: Pt states she is pleased with her progress; she would like the rest of the stiffness to go away  Objective: See treatment diary below      Assessment: Tolerated treatment well  Patient exhibited good technique with therapeutic exercises      Plan: Continue per plan of care        Precautions: R TKR 2021  Date: 21 6   Pain: 3 2 3 3 3   Visit: 42 37 39 50 63   Manuals        PROM and jt mobilization R knee ds prom [facility]   Calf and Hs stretching ds [facility]   Neuro Re-Ed        Standing marching 4# 2/10 3# 2/10 3# 2/20 4# 2/10 4# 2/10   Standing hip abduction 4# 2/10 3# 2/10 3# 2/10 4# 2/10 4# 2/10   Step ups F/ Lat 8" 2/10  ----> 8" 2/10 8" 2/10  Lat/FWD 8" 2/10    Mini-squats 2/10 2/10 2/10 2/10 2/10   SLS on mat 3x 15" 15" x3 3x 15" 3x 15" 3x 15"   Tandem stance mat 3x 15' 15" x3  3x 15" 15' x3 3x 15'   Toe-raises 3# 2/10 3# 2/15 3# 2/15 4# 30x 4# 2/10   Ther Ex        Quad sets 30 x 3"  30x 3" 30x 3" 30x 3" 30x 3"   SLR c hang(2) 4# 4/5 3# 4/5 3# 4/5 4# 4/5 3# 4/5   Supine hip abd L5 2/15 L5 2/15 L5 3/10 L5 3/10 L5 3/10   SAQ's 4# 2/15 3# 3/10 3# 3/10 4# 3/10 4# 2/15   Adductor squeezes 30x x30 30x 3" 30x  30x   Hamstring curl-Uni 40# 15B 30# 2/15 35#  2/15 35# U  2/15 40# B   15   LF knee extension 30# 2/15 15# 3/10 30# 2/15 30# 2/15 30#  2/15   Leg Press/toe raise 50# 2/15 40# 3/10 50# 3/10 50# 3/10 50#  15   Bike 10m  10m fr 10m L0   full rev S13 10 min 10m     Ther Activity                Gait Training        TM        Modalities        CP w/ IFC R knee 15m  CP 15 m cp 15m CP 15 m 15 m

## 2021-06-09 ENCOUNTER — OFFICE VISIT (OUTPATIENT)
Dept: PHYSICAL THERAPY | Facility: CLINIC | Age: 71
End: 2021-06-09
Payer: MEDICARE

## 2021-06-09 DIAGNOSIS — M24.661 ARTHROFIBROSIS OF KNEE JOINT, RIGHT: Primary | ICD-10-CM

## 2021-06-09 DIAGNOSIS — M17.11 PRIMARY OSTEOARTHRITIS OF RIGHT KNEE: ICD-10-CM

## 2021-06-09 DIAGNOSIS — Z96.651 STATUS POST TOTAL RIGHT KNEE REPLACEMENT: ICD-10-CM

## 2021-06-09 PROCEDURE — 97140 MANUAL THERAPY 1/> REGIONS: CPT

## 2021-06-09 PROCEDURE — 97014 ELECTRIC STIMULATION THERAPY: CPT

## 2021-06-09 PROCEDURE — 97110 THERAPEUTIC EXERCISES: CPT

## 2021-06-09 PROCEDURE — 97112 NEUROMUSCULAR REEDUCATION: CPT

## 2021-06-09 NOTE — PROGRESS NOTES
Daily Note     Today's date: 2021  Patient name: Dariana Spaulding  : 1950  MRN: [id number]  Referring provider: Dai Molina DO  Dx:   Encounter Diagnosis     ICD-10-CM    1  Arthrofibrosis of knee joint, right  M24 661    2  Primary osteoarthritis of right knee  M17 11    3  Status post total right knee replacement  Z96 651        Start Time: 1100  Stop Time: 1200  Total time in clinic (min): 60 minutes    Subjective: Pt states she has an MD visit tomorrow  Objective: See treatment diary below  Discontinued table exercises except SLR/hangs  Assessment: Tolerated treatment well  Patient exhibited good technique with therapeutic exercises      Plan: Continue per plan of care        Precautions: R TKR 2021  Date: 21 6   Pain: 3 2 3 3 3   Visit: 80 13 48 07 36   Manuals        PROM and jt mobilization R knee ds prom [facility]   Calf and Hs stretching ds [facility]   Neuro Re-Ed        Standing marching 4# 2/10 4# 2/10 3# 2/20 4# 2/10 4# 2/10   Standing hip abduction 4# 2/10 4# 2/10 3# 2/10 4# 2/10 4# 2/10   Step ups F/ Lat 8" 2/10  15x ea 8" 2/10 8" 2/10  Lat/FWD 8" 2/10    Mini-squats 2/10 2/10 2/10 2/10 2/10   SLS on mat 3x 15" 15" x3 3x 15" 3x 15" 3x 15"   Tandem stance mat 3x 15' 15" x3  3x 15" 15' x3 3x 15'   Toe-raises 3# 2/10 dc 3# 2/15 4# 30x 4# 2/10   Ther Ex        Quad sets 30 x 3"  dc 30x 3" 30x 3" 30x 3"   SLR c hang(2) 4# 4/5 dc 3# 4/5 4# 4/5 3# 4/5   Supine hip abd L5 2/15 dc L5 3/10 L5 3/10 L5 3/10   SAQ's 4# 2/15 dc 3# 3/10 4# 3/10 4# 2/15   Adductor squeezes 30x dc 30x 3" 30x  30x   Hamstring curl-Uni 40# 15B 40# 15B 35#  2/15 35# U  2/15 40# B   2/15   LF knee extension 30# 2/15 kl630# 2/15 30# 2/15 30# 2/15 30#  2/15   Leg Press/toe raise 50# 2/15 55# 2/15 50# 3/10 50# 3/10 50#  2/15   Bike 10m  10m fr 10m L0   full rev S13 10 min 10m     Ther Activity        Gait Training        Modalities        CP w/ IFC R knee 15m  CP 15m 15m CP 15 m 15 m

## 2021-06-10 ENCOUNTER — OFFICE VISIT (OUTPATIENT)
Dept: OBGYN CLINIC | Facility: CLINIC | Age: 71
End: 2021-06-10
Payer: MEDICARE

## 2021-06-10 VITALS
HEIGHT: 68 IN | BODY MASS INDEX: 24.86 KG/M2 | WEIGHT: 164 LBS | HEART RATE: 93 BPM | DIASTOLIC BLOOD PRESSURE: 91 MMHG | SYSTOLIC BLOOD PRESSURE: 180 MMHG

## 2021-06-10 DIAGNOSIS — Z96.651 S/P TOTAL KNEE REPLACEMENT, RIGHT: ICD-10-CM

## 2021-06-10 DIAGNOSIS — M24.661 ARTHROFIBROSIS OF KNEE JOINT, RIGHT: Primary | ICD-10-CM

## 2021-06-10 PROCEDURE — 99213 OFFICE O/P EST LOW 20 MIN: CPT | Performed by: ORTHOPAEDIC SURGERY

## 2021-06-10 NOTE — PROGRESS NOTES
Assessment/Plan:  Assessment/Plan   There are no diagnoses linked to this encounter  Discussed with patient that she is improving, but ideally we would like her to be able to achieve close to 120° of knee flexion  She should continue with formal physical therapy until she completely plateaus in his strength and range of motion gains  She should also continue home exercise program daily as prescribed  She will be seen for follow-up in 6 weeks for re-evaluation  Patient is in agreement with this treatment plan  the patient is doing much better since her manipulation under anesthesia  Her last measured full flexion was 113°  This is improved  From 110° with the prior visit  At this point, I would recommend continuation of rehabilitation until she plateaus  The importance of a home exercise program was also discussed  Return back in 6 weeks for re-evaluation with new x-rays of right knee -three views  Subjective:   Patient ID: Marita Colin is a 78 y o  female  HPI     patient presents 7 weeks s/p right knee manipulation secondary to TKA performed 4/19/2021  Patient is now just shy of 6 months S/P right knee TKA performed 1/20/2021  She reports that she has been consistent with physical therapy since her manipulation  She has seen improvement in her knee flexion  She reports mild generalized soreness in the medial aspect of the knee  She denies any recent swelling, bruising, numbness, tingling, or feelings of instability      The following portions of the patient's history were reviewed and updated as appropriate: allergies, current medications, past family history, past medical history, past social history, past surgical history and problem list     Past Medical History:   Diagnosis Date    Cardiac murmur due to mitral valve disorder     GERD (gastroesophageal reflux disease)     Hyperlipidemia     Hypertension     PONV (postoperative nausea and vomiting)     PVC's (premature ventricular contractions)      Past Surgical History:   Procedure Laterality Date    CARPAL TUNNEL RELEASE Bilateral     CATARACT EXTRACTION Bilateral     COLONOSCOPY      JOINT REPLACEMENT      KNEE SURGERY Right     PLANTAR'S WART EXCISION Right     PA MANIPULATN KNEE JT+ANESTHESIA Right 4/19/2021    Procedure: MANIPULATION JOINT KNEE;  Surgeon: Nurys Chairez DO;  Location: [address] [place] [address] OR;  Service: Orthopedics    PA TOTAL KNEE ARTHROPLASTY Right 1/20/2021    Procedure: KNEE TOTAL ARTHROPLASTY;  Surgeon: Nurys Chairez DO;  Location: [address] [place] [address] MAIN OR;  Service: Orthopedics    TONSILLECTOMY      WRIST SURGERY Bilateral      Family History   Problem Relation Age of Onset    Heart disease Mother     Hypertension Mother     Hyperlipidemia Mother     Heart disease Father     Hypertension Father     Hyperlipidemia Father     Cancer Sister      Social History     Socioeconomic History    Marital status: /Civil Union     Spouse name: None    Number of children: None    Years of education: None    Highest education level: None   Occupational History    None   Social Needs    Financial resource strain: None    Food insecurity     Worry: None     Inability: None    Transportation needs     Medical: None     Non-medical: None   Tobacco Use    Smoking status: Never Smoker    Smokeless tobacco: Never Used   Substance and Sexual Activity    Alcohol use:  Yes     Alcohol/week: 2 0 standard drinks     Types: 1 Glasses of wine, 1 Standard drinks or equivalent per week     Frequency: 4 or more times a week     Comment: occasional    Drug use: Never    Sexual activity: Yes     Partners: Male   Lifestyle    Physical activity     Days per week: None     Minutes per session: None    Stress: None   Relationships    Social connections     Talks on phone: None     Gets together: None     Attends Religion service: None     Active member of club or organization: None     Attends meetings of clubs or organizations: None     Relationship status: None    Intimate partner violence     Fear of current or ex partner: None     Emotionally abused: None     Physically abused: None     Forced sexual activity: None   Other Topics Concern    None   Social History Narrative    None       Current Outpatient Medications:     aspirin (ECOTRIN) 325 mg EC tablet, Take 1 tablet (325 mg total) by mouth daily, Disp: 30 tablet, Rfl: 0    Calcium Carbonate-Vitamin D (CALCIUM 600/VITAMIN D PO), Take by mouth tues/thursday, Disp: , Rfl:     Cholecalciferol (Vitamin D3) 50 MCG (2000 UT) TABS, Take 2,000 Units by mouth Monday/wednesday/friday, Disp: , Rfl:     magnesium oxide (MAG-OX) 400 mg, Take 400 mg by mouth daily, Disp: , Rfl:     metoprolol succinate (TOPROL-XL) 25 mg 24 hr tablet, Take 25 mg by mouth daily, Disp: , Rfl:     naproxen (NAPROSYN) 500 mg tablet, Take 1 tablet (500 mg total) by mouth 2 (two) times a day with meals, Disp: 180 tablet, Rfl: 2    Omega-3 Fatty Acids (Ultra Omega-3 Fish Oil) 1400 MG CAPS, Take by mouth, Disp: , Rfl:     omeprazole (PriLOSEC) 20 mg delayed release capsule, Take 20 mg by mouth as needed, Disp: , Rfl:     oxyCODONE-acetaminophen (PERCOCET) 5-325 mg per tablet, Take 1 tablet by mouth every 4 (four) hours as needed for moderate painMax Daily Amount: 6 tablets, Disp: 30 tablet, Rfl: 0    ascorbic acid (VITAMIN C) 500 MG tablet, Take 1 tablet (500 mg total) by mouth 2 (two) times a day, Disp: 60 tablet, Rfl: 1    Multiple Vitamins-Minerals (multivitamin with minerals) tablet, Take 1 tablet by mouth daily, Disp: 30 tablet, Rfl: 1    Allergies   Allergen Reactions    Levaquin [Levofloxacin] Hives    Cephalosporins Rash and Other (See Comments)     rash    Dilaudid [Hydromorphone] Nausea Only     And sweaty    Keflex [Cephalexin] Rash    Lansoprazole Other (See Comments)     rash    Penicillins Rash    Tetracycline Rash         Review of Systems   Constitutional: Negative for fatigue  Gastrointestinal: Negative for nausea  Musculoskeletal:        As noted in HPI   Neurological: Negative for dizziness, weakness, numbness and headaches  All other systems reviewed and are negative  Objective:  BP (!) 180/91   Pulse 93   Ht 5' 8" (1 727 m)   Wt 74 4 kg (164 lb)   BMI 24 94 kg/m²     Ortho Exam   Right knee -    Weight-bearing status:  Full WBAT, ambulates with smooth gait pattern and no assistive device  Incision:  Well-healed  Skin is warm and dry with no signs of erythema, ecchymosis, or infection   mild generalized soft tissue swelling, no effusion  ROM:  5°- 110°  Strength:  5-/5 throughout  Calf compartments are soft  Knee is stable to varus, valgus, anterior, and posterior stress  2+ TP and DP pulses with brisk capillary refill to the toes  Sural, saphenous, tibial, superficial and deep peroneal motor and sensory distributions intact  Sensation light touch intact distally    Physical Exam  Constitutional:       Appearance: She is well-developed  HENT:      Right Ear: External ear normal       Left Ear: External ear normal       Nose: Nose normal    Eyes:      Conjunctiva/sclera: Conjunctivae normal       Pupils: Pupils are equal, round, and reactive to light  Neck:      Musculoskeletal: Normal range of motion  Pulmonary:      Effort: Pulmonary effort is normal    Musculoskeletal: Normal range of motion  Skin:     General: Skin is warm and dry  Neurological:      Mental Status: She is alert and oriented to person, place, and time  Psychiatric:         Behavior: Behavior normal          Thought Content:  Thought content normal          Judgment: Judgment normal          Imaging:  No new imaging reviewed this visit    Scribe Attestation    I,:  Vanessa Keen am acting as a scribe while in the presence of the attending physician :       I,:  Turner Dakin, DO personally performed the services described in this documentation    as scribed in my presence :

## 2021-06-11 ENCOUNTER — TELEPHONE (OUTPATIENT)
Dept: OBGYN CLINIC | Facility: HOSPITAL | Age: 71
End: 2021-06-11

## 2021-06-11 NOTE — TELEPHONE ENCOUNTER
Patient is calling to ask if she is to continue using the [employer]        Steph Nuñez [phone]

## 2021-06-14 ENCOUNTER — OFFICE VISIT (OUTPATIENT)
Dept: PHYSICAL THERAPY | Facility: CLINIC | Age: 71
End: 2021-06-14
Payer: MEDICARE

## 2021-06-14 DIAGNOSIS — Z96.651 STATUS POST TOTAL RIGHT KNEE REPLACEMENT: ICD-10-CM

## 2021-06-14 DIAGNOSIS — M17.11 PRIMARY OSTEOARTHRITIS OF RIGHT KNEE: ICD-10-CM

## 2021-06-14 DIAGNOSIS — M24.661 ARTHROFIBROSIS OF KNEE JOINT, RIGHT: Primary | ICD-10-CM

## 2021-06-14 PROCEDURE — 97140 MANUAL THERAPY 1/> REGIONS: CPT

## 2021-06-14 PROCEDURE — 97112 NEUROMUSCULAR REEDUCATION: CPT

## 2021-06-14 PROCEDURE — 97110 THERAPEUTIC EXERCISES: CPT

## 2021-06-14 PROCEDURE — 97014 ELECTRIC STIMULATION THERAPY: CPT

## 2021-06-14 NOTE — PROGRESS NOTES
Daily Note     Today's date: 2021  Patient name: Patricia Valencia  : 1950  MRN: [id number]  Referring provider: Anita Meraz DO  Dx:   Encounter Diagnosis     ICD-10-CM    1  Arthrofibrosis of knee joint, right  M24 661    2  Primary osteoarthritis of right knee  M17 11    3  Status post total right knee replacement  Z96 651        Start Time: 1100  Stop Time: 1200  Total time in clinic (min): 60 minutes    Subjective: Patient stated that she has been continuing with self stretching at home, and has been working to gain more flexion  She offered no c/o pain at this time  Objective: PTA directed patient in LE strengthening and ROM program, See treatment diary below  Assessment: Tolerated treatment well  Patient exhibited good technique with therapeutic exercises, no increase in knee pain post manual and TE  She was able to gain 115 degrees with knee flexion today  Plan: Continue per plan of care        Precautions: R TKR 2021  Date: 21 6/   Pain: 3 2  3 3   Visit: 47 05 75 08 32   [id number]        PROM and jt mobilization R knee ds prom ds KW  ds   Calf and Hs stretching ds ds KW [facility] CHARLES ds   Neuro Re-Ed        Standing marching 4# 2/10 4# 2/10 4# 2/10 4# 2/10 4# 2/10   Standing hip abduction 4# 2/10 4# 2/10 4# 2/10 4# 2/10 4# 2/10   Step ups F/ Lat 8" 2/10  15x ea 15x each  8" 2/10  Lat/FWD 8" 2/10    Mini-squats 2/10 2/10 2/10 2/10 2/10   SLS on mat 3x 15" 15" x3 15" x3 3x 15" 3x 15"   Tandem stance mat 3x 15' 15" x3  15" x3 15' x3 3x 15'   Toe-raises 3# 2/10 dc  4# 30x 4# 2/10   Ther Ex        Quad sets 30 x 3"  dc  30x 3" 30x 3"   SLR c hang(2) 4# 4/5 dc  4# 4/5 3# 4/5   Supine hip abd L5 2/15 dc  L5 3/10 L5 3/10   SAQ's 4# 2/15 dc  4# 3/10 4# 2/15   Adductor squeezes 30x dc  30x  30x   Hamstring curl-Uni 40# 2/15B 40# 2/15B 40# 2/15 35# U  2/15 40# B   2/15   LF knee extension 30# 2/15 kl630# 2/15 30# 2/15 30# 2/15 30#  2/15   Leg Press/toe raise 50# 2/15 55# 2/15 55# 2/15 50# 3/10 50#  2/15   Bike 10m  10m fr 10m  S13 10 min 10m     Ther Activity        Gait Training        Modalities        CP w/ IFC R knee 15m  CP 15m 15m  15 m 15 m

## 2021-06-15 NOTE — TELEPHONE ENCOUNTER
Spoke to patient and advised above message from Dr Joshua Macedo  She stated she will call the company and have it sent back   Thank you

## 2021-06-17 ENCOUNTER — OFFICE VISIT (OUTPATIENT)
Dept: PHYSICAL THERAPY | Facility: CLINIC | Age: 71
End: 2021-06-17
Payer: MEDICARE

## 2021-06-17 DIAGNOSIS — Z96.651 STATUS POST TOTAL RIGHT KNEE REPLACEMENT: ICD-10-CM

## 2021-06-17 DIAGNOSIS — M17.11 PRIMARY OSTEOARTHRITIS OF RIGHT KNEE: ICD-10-CM

## 2021-06-17 DIAGNOSIS — M24.661 ARTHROFIBROSIS OF KNEE JOINT, RIGHT: Primary | ICD-10-CM

## 2021-06-17 PROCEDURE — 97112 NEUROMUSCULAR REEDUCATION: CPT | Performed by: PHYSICAL THERAPIST

## 2021-06-17 PROCEDURE — 97110 THERAPEUTIC EXERCISES: CPT | Performed by: PHYSICAL THERAPIST

## 2021-06-17 PROCEDURE — 97140 MANUAL THERAPY 1/> REGIONS: CPT | Performed by: PHYSICAL THERAPIST

## 2021-06-17 NOTE — PROGRESS NOTES
Daily Note     Today's date: 2021  Patient name: Marita Colin  : 1950  MRN: [id number]  Referring provider: Altagracia Perdue DO  Dx:   Encounter Diagnosis     ICD-10-CM    1  Arthrofibrosis of knee joint, right  M24 661    2  Primary osteoarthritis of right knee  M17 11    3  Status post total right knee replacement  Z96 651                   Subjective: Pt reports MD requested she send dynasplint back  Pt reports knee continues to improve  Pain today is 1-2/10  Objective: See treatment diary below  PROM R knee flex 117 degrees, AROM flex 110 degrees      Assessment: Tolerated treatment well  Patient exhibited good technique with therapeutic exercises  No increase in pain with TE  Pt demonstrating good balance and stability on unstable surfaces  Pt continues to demonstrate improvement in ROM into flexion actively and passively  Plan: Continue per plan of care        Precautions: R TKR 2021  Date: 21 6/   Pain: 3 2  1 3   Visit: 40 90 42 07 09   [id number]        PROM and jt mobilization R knee ds prom ds KW  ds   Calf and Hs stretching ds ds [facility] CHARLES ds   Neuro Re-Ed        Standing marching 4# 2/10 4# 2/10 4# 2/10 4# 2/15 4# 2/10   Standing hip abduction 4# 2/10 4# 2/10 4# 2/10 4# 2/15 4# 2/10   Step ups F/ Lat 8" 2/10  15x ea 15x each  8" 3/10  Lat/FWD 8" 2/10    Mini-squats 2/10 2/10 2/10 3/10 2/10   SLS on mat 3x 15" 15" x3 15" x3 3x 15" 3x 15"   Tandem stance mat 3x 15' 15" x3  15" x3 15' x3 3x 15'   Toe-raises 3# 2/10 dc  4# 30x on mat 4# 2/10   Ther Ex        Quad sets 30 x 3"  dc   30x 3"   SLR c hang(2) 4# 4/5 dc   3# 4/5   Supine hip abd L5 2/15 dc   L5 3/10   SAQ's 4# 2/15 dc   4# 2/15   Adductor squeezes 30x dc   30x   Hamstring curl-Uni 40# 2/15B 40# 2/15B 40# 2/15 40# B  2/15 40# B   2/15   LF knee extension 30# 2/15 kl630# 2/15 30# 2/15 30# 2/15 30#  2/15   Leg Press/toe raise 50# 2/15 55# 2/15 55# 2/15 55# 3/10 50#  2/15   Bike 10m  10m fr 10m  S12 10 min 10m     Ther Activity        Gait Training        Modalities        CP w/ IFC R knee 15m  CP 15m 15m  15 defer STIM 15 m

## 2021-06-21 ENCOUNTER — OFFICE VISIT (OUTPATIENT)
Dept: PHYSICAL THERAPY | Facility: CLINIC | Age: 71
End: 2021-06-21
Payer: MEDICARE

## 2021-06-21 DIAGNOSIS — M24.661 ARTHROFIBROSIS OF KNEE JOINT, RIGHT: Primary | ICD-10-CM

## 2021-06-21 DIAGNOSIS — Z96.651 STATUS POST TOTAL RIGHT KNEE REPLACEMENT: ICD-10-CM

## 2021-06-21 DIAGNOSIS — M17.11 PRIMARY OSTEOARTHRITIS OF RIGHT KNEE: ICD-10-CM

## 2021-06-21 PROCEDURE — 97112 NEUROMUSCULAR REEDUCATION: CPT | Performed by: PHYSICAL THERAPIST

## 2021-06-21 PROCEDURE — 97140 MANUAL THERAPY 1/> REGIONS: CPT | Performed by: PHYSICAL THERAPIST

## 2021-06-21 PROCEDURE — 97110 THERAPEUTIC EXERCISES: CPT | Performed by: PHYSICAL THERAPIST

## 2021-06-21 NOTE — PROGRESS NOTES
Daily Note     Today's date: 2021  Patient name: Santa Saeed  : 1950  MRN: [id number]  Referring provider: Kathleen Krishnan DO  Dx:   Encounter Diagnosis     ICD-10-CM    1  Arthrofibrosis of knee joint, right  M24 661    2  Primary osteoarthritis of right knee  M17 11    3  Status post total right knee replacement  Z96 651                   Subjective: Pt only notes stiffness at R knee entering clinic  Objective: See treatment diary below  Increased to 50# on LF hamstring curl and LF leg extension machine  Assessment: Tolerated treatment well  Patient continues to demonstrate improvement in stiffness and ROM at R knee  Pt demonstrating improved strength and balance with standing TE and squatting exercise  Plan: Progress note during next visit        Precautions: R TKR 2021  Date: 21   Pain: 3 2  1 0   Visit: 96 97 73 88 39   Manuals        PROM and jt mobilization R knee ds prom ds KW [facility]   Calf and Hs stretching ds ds KW [facility]   Neuro Re-Ed        Standing marching 4# 2/10 4# 2/10 4# 2/10 4# 2/15 4# 4/10   Standing hip abduction 4# 2/10 4# 2/10 4# 2/10 4# 2/15 4# 4/10   Step ups F/ Lat 8" 2/10  15x ea 15x each  8" 3/10  Lat/FWD 8" 4/10    Mini-squats 2/10 2/10 2/10 3/10 4/10   SLS on mat 3x 15" 15" x3 15" x3 3x 15" 3x 15"   Tandem stance mat 3x 15' 15" x3  15" x3 15' x3 3x 15'   Toe-raises 3# 2/10 dc  4# 30x on mat 4# 4/10   Ther Ex        Quad sets 30 x 3"  dc      SLR c hang(2) 4# 4/5 dc   4# 2/10   Supine hip abd L5 2/15 dc      SAQ's 4# 2/15 dc      Adductor squeezes 30x dc      Hamstring curl-Uni 40# 2/15B 40# 2/15B 40# 2/15 40# B  2/15 50# B   2/15   LF knee extension 30# 2/15 kl630# 2/15 30# 2/15 30# 2/15 50#  2/15   Leg Press/toe raise 50# 2/15 55# 2/15 55# 2/15 55# 3/10 55#  2/15   Bike 10m  10m fr 10m  S12 10 min 11m S12     Ther Activity        Gait Training        Modalities        CP w/ IFC R knee 15m  CP 15m 15m  15 defer STIM 15 min CP no STIM

## 2021-06-23 ENCOUNTER — EVALUATION (OUTPATIENT)
Dept: PHYSICAL THERAPY | Facility: CLINIC | Age: 71
End: 2021-06-23
Payer: MEDICARE

## 2021-06-23 DIAGNOSIS — M17.11 PRIMARY OSTEOARTHRITIS OF RIGHT KNEE: ICD-10-CM

## 2021-06-23 DIAGNOSIS — M24.661 ARTHROFIBROSIS OF KNEE JOINT, RIGHT: Primary | ICD-10-CM

## 2021-06-23 DIAGNOSIS — Z96.651 STATUS POST TOTAL RIGHT KNEE REPLACEMENT: ICD-10-CM

## 2021-06-23 PROCEDURE — 97140 MANUAL THERAPY 1/> REGIONS: CPT | Performed by: PHYSICAL THERAPIST

## 2021-06-23 PROCEDURE — 97010 HOT OR COLD PACKS THERAPY: CPT | Performed by: PHYSICAL THERAPIST

## 2021-06-23 PROCEDURE — 97110 THERAPEUTIC EXERCISES: CPT | Performed by: PHYSICAL THERAPIST

## 2021-06-23 PROCEDURE — 97112 NEUROMUSCULAR REEDUCATION: CPT | Performed by: PHYSICAL THERAPIST

## 2021-06-23 NOTE — LETTER
2021    Riccardo Ruff, [address]    Patient: Jeane Gil   YOB: 1950   Date of Visit: 2021     Encounter Diagnosis     ICD-10-CM    1  Arthrofibrosis of knee joint, right  M24 661    2  Primary osteoarthritis of right knee  M17 11    3  Status post total right knee replacement  Z96 651        Dear Dr Lissy De Jesus:    Thank you for your recent referral of Jeane Gil  Please review the attached evaluation summary from [facility] recent visit  Please verify that you agree with the plan of care by signing the attached order  If you have any questions or concerns, please do not hesitate to call  I sincerely appreciate the opportunity to share in the care of one of your patients and hope to have another opportunity to work with you in the near future  Sincerely,    Lewis July, PT      Referring Provider:      I certify that I have read the below Plan of Care and certify the need for these services furnished under this plan of treatment while under my care  Riccardo Ruff DO  [address]  [facility] [address] [facility] [address]          PT Re-Evaluation     Today's date: 2021  Patient name: Jeane Gil  : 1950  MRN: [id number]  Referring provider: Dora Adan DO  Dx:   Encounter Diagnosis     ICD-10-CM    1  Arthrofibrosis of knee joint, right  M24 661    2  Primary osteoarthritis of right knee  M17 11    3  Status post total right knee replacement  Z96 651                   Assessment  Assessment details: Pt is a 78year old female presenting to Outpatient PT s/p R Total knee replacement on 2021  Pt has been seen for 56 visits of Outpatient PT with treatment consisting of joint mobilization,  and soft tissue mobilization to decrease tightness and spasm, manual stretching into flex and ext, AROM, strengthening balance and proprioception exercises with CP and IFC   Pt continues to demonstrate improvement in ROM and strength at R knee  Pain continues to decrease with increased tolerance for weightbearing and ambulation  Pt demonstrates improve ability to ascend descend stairs with less use of HR with improved tolerance for bending and squatting  Pt still demonstrating limitation in ROM into both flex and extension  Goal is to reach 120 degrees PROM and 115 active with 0 degrees extension and pt is now at 118 PROM flex 112 AROM flex and still at -4 degrees extension  Recommend continued PT x4 weeks to further improve ROM and IADL's anticipating discharge to [facility] at that point  Impairments: abnormal gait, abnormal or restricted ROM, activity intolerance and pain with function  Functional limitations:  stair negotiation, bending, squatting  Symptom irritability: lowUnderstanding of Dx/Px/POC: good   Prognosis: good    Goals  STG's once pt is post op to be met in 3-4 weeks  1  Decrease pain by 25% at worst with activity- met  2  Improve R knee AROM to at least 100 deg flex, -3 degrees extension- partially met  3  Improve R hip knee and ankle strength to within 10 lbs of L- met  4  Improve standing/walking tolerance to 30-45 minutes - met  5  Pt will sleep through night without waking due to pain- not met    LTG's to be met in 12 weeks  1  Decrease pain to 2/10 at worst with activity- met  2  Improve ROM to at least 115 degrees flex, 0 degrees extension- progressing not met  3  Improve R hip knee and ankle strength to within 3-5 lbs of L-  met  4  Improve step negotiation to reciprocal no LOB or instability- met  5  Improve standing and walking tolerance to at least 1 hour - met  6  Decrease swelling R knee by 1 cm-  met  7  Independent with HEP- ongoing and progressing  8  Improve Foto score to at least 60%- met    Plan  Plan details: Anticipate d/c to [facility] in 4 weeks    Patient would benefit from: skilled physical therapy  Planned modality interventions: cryotherapy  Planned therapy interventions: joint mobilization, manual therapy, home exercise program, therapeutic exercise, stretching, strengthening, patient education, gait training, flexibility, therapeutic activities and balance  Frequency: 2x week  Duration in weeks: 4  Plan of Care beginning date: 2021  Plan of Care expiration date: 2021  Treatment plan discussed with: patient        Subjective Evaluation    History of Present Illness  Date of surgery: 2021  Mechanism of injury: surgery  Mechanism of injury: Pt reports continued improvement in R knee  Pt reports more stiffness than pain  Pt only notes soreness with stretching  Pt no longer requiring heat and ice at night with improved sleep tolerance  Quality of life: good    Pain  Current pain ratin  At best pain ratin  At worst pain rating: 3 (after stretching)  Location: R knee (lateral and medial knee)  Relieving factors: ice, rest and medications  Aggravating factors: stair climbing    Treatments  Previous treatment: injection treatment  Current treatment: physical therapy  Patient Goals  Patient goals for therapy: decreased pain, decreased edema, increased motion, increased strength, independence with ADLs/IADLs, return to sport/leisure activities and improved balance  Patient goal: return to 2-4 mile walks- met at 2 miles        Objective     Observations     Additional Observation Details  Antalgic gait pattern with lack of R knee flex during ambulation with SPC- pt ambulating without A  D  without circumduction but still demonstrates mild limp with near normal step length        Neurological Testing     Sensation     Knee   Left Knee   Intact: light touch    Right Knee   Intact: light touch     Active Range of Motion   Left Knee   Flexion: 115 degrees   Extension: 0 degrees     Right Knee   Flexion: 112 degrees   Extension: -4 degrees     Additional Active Range of Motion Details  Pt demonstrating weak quad set due to increase swelling- quad set returned to norm  (I) SLR no quad lag    Passive Range of Motion   Left Knee   Flexion: 120 degrees   Extension: 0 degrees     Right Knee   Flexion: 118 degrees   Extension: 0 degrees     Strength/Myotome Testing     Additional Strength Details                    R                  L  Hip       Flex      25 lbs          24 lbs      Abd      43 lbs          38 lbs      Add      33 lbs          27 lbs  Knee      Ext        49 lbs         44 lbs     Flex        41 lbs         33 lbs  Ankle       DF        26 lbs           21 lbs       PF        65 lbs           42 lbs    General Comments:      Knee Comments  Girth:                 R                  suprapat         42 3 cm       Mid joint          41 0 cm    Stair negotiation is with step to pattern due to pain- stair negotiation has progressed to reciprocal ascending 0-1 HR, descending 1 HR  Standing tolerance 30 minutes with increased pain- improved to 2 HR's with increased soreness  Walking tolerance 1 1/2 blocks with SPC limited by pain- Walking tolerance no A D    Increased to 2 miles  Difficulty with donning shoes and socks requiring bending at waist-improved but still has to slightly modify technique  Assist with bathing/showering- (I) with bathing and showering  Pt unable to kneel or squat- able to kneel but pain present, improved squatting  Sleep disturbed by pain nightly with sleep tolerance 3 hours- sleep still disturbed nightly with sleep tolerance 4 hours  Foto- 43- improved to 69             Precautions: R TKR 1/20/2021  Date: 6/7 6/9 6/14 6/17/21 6/21   Pain: 3 2  1 0   Visit: 84 23 70 93 62   Manuals        PROM and jt mobilization R knee ds prom ds KW [facility]   Calf and Hs stretching ds ds KW [facility]   Neuro Re-Ed        Standing marching 4# 2/10 4# 2/10 4# 2/10 4# 2/15 4# 4/10   Standing hip abduction 4# 2/10 4# 2/10 4# 2/10 4# 2/15 4# 4/10   Step ups F/ Lat 8" 2/10  15x ea 15x each  8" 3/10  Lat/FWD 8" 4/10    Mini-squats 2/10 2/10 2/10 3/10 4/10   SLS on mat 3x 15" 15" x3 15" x3 3x 15" 3x 15"   Tandem stance mat 3x 15' 15" x3  15" x3 15' x3 3x 15'   Toe-raises 3# 2/10 dc  4# 30x on mat 4# 4/10 on mat   Ther Ex        Quad sets 30 x 3"  dc      SLR c hang(2) 4# 4/5 dc   4# 4/5   Supine hip abd L5 2/15 dc      SAQ's 4# 2/15 dc      Adductor squeezes 30x dc      Hamstring curl-Uni 40# 2/15B 40# 2/15B 40# 2/15 40# B  2/15 50# B   2/15   LF knee extension 30# 2/15 kl630# 2/15 30# 2/15 30# 2/15 50#  2/15   Leg Press/toe raise 50# 2/15 55# 2/15 55# 2/15 55# 3/10 55#  2/15   Bike 10m  10m fr 10m  S12 10 min 11m S12     Ther Activity        Gait Training        Modalities        CP w/ IFC R knee 15m  CP 15m 15m  15 defer STIM 15 min CP no STIM

## 2021-06-23 NOTE — PROGRESS NOTES
PT Re-Evaluation     Today's date: 2021  Patient name: Sergio Amor  : 1950  MRN: [id number]  Referring provider: Shauna Phoenix, DO  Dx:   Encounter Diagnosis     ICD-10-CM    1  Arthrofibrosis of knee joint, right  M24 661    2  Primary osteoarthritis of right knee  M17 11    3  Status post total right knee replacement  Z96 651                   Assessment  Assessment details: Pt is a 78year old female presenting to Outpatient PT s/p R Total knee replacement on 2021  Pt has been seen for 56 visits of Outpatient PT with treatment consisting of joint mobilization,  and soft tissue mobilization to decrease tightness and spasm, manual stretching into flex and ext, AROM, strengthening balance and proprioception exercises with CP and IFC  Pt continues to demonstrate improvement in ROM and strength at R knee  Pain continues to decrease with increased tolerance for weightbearing and ambulation  Pt demonstrates improve ability to ascend descend stairs with less use of HR with improved tolerance for bending and squatting  Pt still demonstrating limitation in ROM into both flex and extension  Goal is to reach 120 degrees PROM and 115 active with 0 degrees extension and pt is now at 118 PROM flex 112 AROM flex and still at -4 degrees extension  Recommend continued PT x4 weeks to further improve ROM and IADL's anticipating discharge to [facility] at that point  Impairments: abnormal gait, abnormal or restricted ROM, activity intolerance and pain with function  Functional limitations:  stair negotiation, bending, squatting  Symptom irritability: lowUnderstanding of Dx/Px/POC: good   Prognosis: good    Goals  STG's once pt is post op to be met in 3-4 weeks  1  Decrease pain by 25% at worst with activity- met  2  Improve R knee AROM to at least 100 deg flex, -3 degrees extension- partially met  3  Improve R hip knee and ankle strength to within 10 lbs of L- met  4   Improve standing/walking tolerance to 30-45 minutes - met  5  Pt will sleep through night without waking due to pain- not met    LTG's to be met in 12 weeks  1  Decrease pain to 2/10 at worst with activity- met  2  Improve ROM to at least 115 degrees flex, 0 degrees extension- progressing not met  3  Improve R hip knee and ankle strength to within 3-5 lbs of L-  met  4  Improve step negotiation to reciprocal no LOB or instability- met  5  Improve standing and walking tolerance to at least 1 hour - met  6  Decrease swelling R knee by 1 cm-  met  7  Independent with HEP- ongoing and progressing  8  Improve Foto score to at least 60%- met    Plan  Plan details: Anticipate d/c to HEP in 4 weeks  Patient would benefit from: skilled physical therapy  Planned modality interventions: cryotherapy  Planned therapy interventions: joint mobilization, manual therapy, home exercise program, therapeutic exercise, stretching, strengthening, patient education, gait training, flexibility, therapeutic activities and balance  Frequency: 2x week  Duration in weeks: 4  Plan of Care beginning date: 2021  Plan of Care expiration date: 2021  Treatment plan discussed with: patient        Subjective Evaluation    History of Present Illness  Date of surgery: 2021  Mechanism of injury: surgery  Mechanism of injury: Pt reports continued improvement in R knee  Pt reports more stiffness than pain  Pt only notes soreness with stretching  Pt no longer requiring heat and ice at night with improved sleep tolerance     Quality of life: good    Pain  Current pain ratin  At best pain ratin  At worst pain rating: 3 (after stretching)  Location: R knee (lateral and medial knee)  Relieving factors: ice, rest and medications  Aggravating factors: stair climbing    Treatments  Previous treatment: injection treatment  Current treatment: physical therapy  Patient Goals  Patient goals for therapy: decreased pain, decreased edema, increased motion, increased strength, independence with ADLs/IADLs, return to sport/leisure activities and improved balance  Patient goal: return to 2-4 mile walks- met at 2 miles        Objective     Observations     Additional Observation Details  Antalgic gait pattern with lack of R knee flex during ambulation with SPC- pt ambulating without A  D  without circumduction but still demonstrates mild limp with near normal step length  Neurological Testing     Sensation     Knee   Left Knee   Intact: light touch    Right Knee   Intact: light touch     Active Range of Motion   Left Knee   Flexion: 115 degrees   Extension: 0 degrees     Right Knee   Flexion: 112 degrees   Extension: -4 degrees     Additional Active Range of Motion Details  Pt demonstrating weak quad set due to increase swelling- quad set returned to norm  (I) SLR no quad lag    Passive Range of Motion   Left Knee   Flexion: 120 degrees   Extension: 0 degrees     Right Knee   Flexion: 118 degrees   Extension: 0 degrees     Strength/Myotome Testing     Additional Strength Details                    R                  L  Hip       Flex      25 lbs          24 lbs      Abd      43 lbs          38 lbs      Add      33 lbs          27 lbs  Knee      Ext        49 lbs         44 lbs     Flex        41 lbs         33 lbs  Ankle       DF        26 lbs           21 lbs       PF        65 lbs           42 lbs    General Comments:      Knee Comments  Girth:                 R                  suprapat         42 3 cm       Mid joint          41 0 cm    Stair negotiation is with step to pattern due to pain- stair negotiation has progressed to reciprocal ascending 0-1 HR, descending 1 HR  Standing tolerance 30 minutes with increased pain- improved to 2 HR's with increased soreness  Walking tolerance 1 1/2 blocks with SPC limited by pain- Walking tolerance no A D    Increased to 2 miles  Difficulty with donning shoes and socks requiring bending at waist-improved but still has to slightly modify technique  Assist with bathing/showering- (I) with bathing and showering  Pt unable to kneel or squat- able to kneel but pain present, improved squatting  Sleep disturbed by pain nightly with sleep tolerance 3 hours- sleep still disturbed nightly with sleep tolerance 4 hours  Foto- 43- improved to 69             Precautions: R TKR 1/20/2021  Date: 6/7 6/9 6/14 6/17/21 6/21   Pain: 3 2  1 0   Visit: 86 40 05 75 71   Manuals        PROM and jt mobilization R knee ds prom ds KW [facility]   Calf and Hs stretching ds ds [facility]   Neuro Re-Ed        Standing marching 4# 2/10 4# 2/10 4# 2/10 4# 2/15 4# 4/10   Standing hip abduction 4# 2/10 4# 2/10 4# 2/10 4# 2/15 4# 4/10   Step ups F/ Lat 8" 2/10  15x ea 15x each  8" 3/10  Lat/FWD 8" 4/10    Mini-squats 2/10 2/10 2/10 3/10 4/10   SLS on mat 3x 15" 15" x3 15" x3 3x 15" 3x 15"   Tandem stance mat 3x 15' 15" x3  15" x3 15' x3 3x 15'   Toe-raises 3# 2/10 dc  4# 30x on mat 4# 4/10 on mat   Ther Ex        Quad sets 30 x 3"  dc      SLR c hang(2) 4# 4/5 dc   4# 4/5   Supine hip abd L5 2/15 dc      SAQ's 4# 2/15 dc      Adductor squeezes 30x dc      Hamstring curl-Uni 40# 2/15B 40# 2/15B 40# 2/15 40# B  2/15 50# B   2/15   LF knee extension 30# 2/15 kl630# 2/15 30# 2/15 30# 2/15 50#  2/15   Leg Press/toe raise 50# 2/15 55# 2/15 55# 2/15 55# 3/10 55#  2/15   Bike 10m  10m fr 10m  S12 10 min 11m S12     Ther Activity        Gait Training        Modalities        CP w/ IFC R knee 15m  CP 15m 15m  15 defer STIM 15 min CP no STIM

## 2021-06-28 ENCOUNTER — OFFICE VISIT (OUTPATIENT)
Dept: PHYSICAL THERAPY | Facility: CLINIC | Age: 71
End: 2021-06-28
Payer: MEDICARE

## 2021-06-28 DIAGNOSIS — M17.11 PRIMARY OSTEOARTHRITIS OF RIGHT KNEE: ICD-10-CM

## 2021-06-28 DIAGNOSIS — M24.661 ARTHROFIBROSIS OF KNEE JOINT, RIGHT: Primary | ICD-10-CM

## 2021-06-28 DIAGNOSIS — Z96.651 STATUS POST TOTAL RIGHT KNEE REPLACEMENT: ICD-10-CM

## 2021-06-28 PROCEDURE — 97110 THERAPEUTIC EXERCISES: CPT

## 2021-06-28 PROCEDURE — 97112 NEUROMUSCULAR REEDUCATION: CPT

## 2021-06-28 PROCEDURE — 97140 MANUAL THERAPY 1/> REGIONS: CPT

## 2021-06-28 PROCEDURE — 97010 HOT OR COLD PACKS THERAPY: CPT

## 2021-06-28 NOTE — PROGRESS NOTES
Daily Note     Today's date: 2021  Patient name: Dee Dee Diego  : 1950  MRN: [id number]  Referring provider: Annie Essex, DO  Dx:   Encounter Diagnosis     ICD-10-CM    1  Arthrofibrosis of knee joint, right  M24 661    2  Primary osteoarthritis of right knee  M17 11    3  Status post total right knee replacement  Z96 651        Start Time: 6493  Stop Time: 1200  Total time in clinic (min): 75 minutes    Subjective: Patient stated tightness in knee this session, as she has been doing more stretching over the weekend  Objective: PTA directed patient in LE strengthening and ROM program, with warm up on LE Bike, See treatment diary below  Assessment: Tolerated treatment well  Patient exhibited good technique with therapeutic exercises, less discomfort noted with PROM flexion  Ice applied in seated post treat  Plan: Continue per plan of care        Precautions: R TKR 2021  Date:  6/9 21   Pain: 0 2  1 0   Visit: 16 50 49 39 64   [id number]        PROM and jt mobilization R knee KW ds KW NPC IIIC [facility]   Calf and Hs stretching KW ds KW [facility]   Neuro Re-Ed        Standing marching 4# 4/10 4# 2/10 4# 2/10 4# 2/15 4# 4/10   Standing hip abduction 4# 4/10 4# 2/10 4# 2/10 4# 2/15 4# 4/10   Step ups F/ Lat 6" 4/10 15x ea 15x each  8" 310  Lat/FWD 8" 4/10    Mini-squats 3/10  1/10 SL 2/10 2/10 3/10 4/10   SLS on mat 3x 15" 15" x3 15" x3 3x 15" 3x 15"   Tandem stance mat 3x 15" 15" x3  15" x3 15' x3 3x 15'   Toe-raises  dc  4# 30x on mat 4# 4/10 on mat   Ther Ex        Quad sets  dc      SLR c hang(2)  dc   4# 4/5   Supine hip abd  dc      SAQ's  dc      Adductor squeezes  dc      Hamstring curl-Uni 50# 2/15 40# 2/15B 40# 2/15 40# B  2/15 50# B   2/15   LF knee extension 50# 2/15 kl630# 2/15 30# 2/15 30# 2/15 50#  2/15   Leg Press/toe raise 55# 2/15 55# 2/15 55# 2/15 55# 3/10 55#  2/15   Bike 10 min  10m fr 10m  S12 10 min 11m S12     Ther Activity        Gait Training Modalities        CP w/ IFC R knee 15 CP 15m 15m  15 defer STIM 15 min CP no STIM

## 2021-06-30 ENCOUNTER — OFFICE VISIT (OUTPATIENT)
Dept: PHYSICAL THERAPY | Facility: CLINIC | Age: 71
End: 2021-06-30
Payer: MEDICARE

## 2021-06-30 DIAGNOSIS — Z96.651 STATUS POST TOTAL RIGHT KNEE REPLACEMENT: ICD-10-CM

## 2021-06-30 DIAGNOSIS — M24.661 ARTHROFIBROSIS OF KNEE JOINT, RIGHT: Primary | ICD-10-CM

## 2021-06-30 DIAGNOSIS — M17.11 PRIMARY OSTEOARTHRITIS OF RIGHT KNEE: ICD-10-CM

## 2021-06-30 PROCEDURE — 97140 MANUAL THERAPY 1/> REGIONS: CPT

## 2021-06-30 PROCEDURE — 97110 THERAPEUTIC EXERCISES: CPT

## 2021-06-30 PROCEDURE — 97112 NEUROMUSCULAR REEDUCATION: CPT

## 2021-06-30 NOTE — PROGRESS NOTES
Daily Note     Today's date: 2021  Patient name: Patricia Valencia  : 1950  MRN: [id number]  Referring provider: Anita Meraz DO  Dx:   Encounter Diagnosis     ICD-10-CM    1  Arthrofibrosis of knee joint, right  M24 661    2  Primary osteoarthritis of right knee  M17 11    3  Status post total right knee replacement  Z96 651                   Subjective: Pt continues with her HEP  Objective: See treatment diary below      Assessment: Tolerated treatment well  Patient exhibited good technique with therapeutic exercises      Plan: Continue per plan of care        Precautions: R TKR 2021  Date: 21   Pain: 0 0  1 0   Visit: 93 90 30 31 19   [id number]        PROM and jt mobilization R knee KW ds [facility]   Calf / Hs stretching KW ds KW [facility]LES   Neuro Re-Ed        Standing marching 4# 4/10 4# 2/10 4# 2/10 4# 2/15 4# 4/10   Standing hip abduction 4# 4/10 4# 2/10 4# 2/10 4# 2/15 4# 4/10   Step ups F/ Lat 8" 8" 4/10  8"2/15 15x each  8" 3/10  Lat/FWD 8" 4/10    Mini-squats 3/10  1/10 SL 15x 2/10 3/10 4/10   SLS on mat 3x 15" 15" x3 15" x3 3x 15" 3x 15"   Tandem stance mat 3x 15" 15" x3  15" x3 15' x3 3x 15'   Ther Ex        Hamstring curl  40# 2/15 40# 2/15 40# 2/15 40# B  2/15 50# B   2/15   LF knee extension 50# 2/15 50# 2/15 30# 2/15 30# 2/15 50#  2/15   Leg Press/toe raise 55# 2/15 55# 2/15 55# 2/15 55# 3/10 55#  2/15   Bike 11m L1 11m L1 10m  S12 10 min 11m S12     Ther Activity        Gait Training        Modalities        CP  R knee 15 CP 15mCP 15m  15 defer STIM 15 min CP no STIM

## 2021-07-06 ENCOUNTER — OFFICE VISIT (OUTPATIENT)
Dept: PHYSICAL THERAPY | Facility: CLINIC | Age: 71
End: 2021-07-06
Payer: MEDICARE

## 2021-07-06 DIAGNOSIS — Z96.651 STATUS POST TOTAL RIGHT KNEE REPLACEMENT: ICD-10-CM

## 2021-07-06 DIAGNOSIS — M24.661 ARTHROFIBROSIS OF KNEE JOINT, RIGHT: Primary | ICD-10-CM

## 2021-07-06 DIAGNOSIS — M17.11 PRIMARY OSTEOARTHRITIS OF RIGHT KNEE: ICD-10-CM

## 2021-07-06 PROCEDURE — 97140 MANUAL THERAPY 1/> REGIONS: CPT | Performed by: PHYSICAL THERAPIST

## 2021-07-06 PROCEDURE — 97110 THERAPEUTIC EXERCISES: CPT | Performed by: PHYSICAL THERAPIST

## 2021-07-06 PROCEDURE — 97112 NEUROMUSCULAR REEDUCATION: CPT | Performed by: PHYSICAL THERAPIST

## 2021-07-06 NOTE — PROGRESS NOTES
Daily Note     Today's date: 2021  Patient name: Mally Valente  : 1950  MRN: [id number]  Referring provider: Dorita Booth DO  Dx:   Encounter Diagnosis     ICD-10-CM    1  Arthrofibrosis of knee joint, right  M24 661    2  Primary osteoarthritis of right knee  M17 11    3  Status post total right knee replacement  Z96 651                   Subjective: Pt reports stiffness persists at R knee rated 1-2/10  Objective: See treatment diary below  Decreased leg extension to 40# due to L knee soreness  Assessment: Tolerated treatment well  Patient with decreased stiffness with TE  Pt tolerated progression to 5# weight well with standing TE and balance exercises  Pt continues to demonstrate improvement in ROM with PROM  Plan: Continue per plan of care        Precautions: R TKR 2021  Date: 21   Pain: 0 0 1 1 0   Visit: 79 22 45 51 31   [id number]        PROM and jt mobilization R knee K[facility]   Calf / Hs stretching KW  [facility]    Neuro Re-Ed        Standing marching 4# 4/10 4# 2/10 5# 2/10 4# 2/15 4# 4/10   Standing hip abduction 4# 4/10 4# 2/10 5# 2/10 4# 2/15 4# 4/10   Step ups F/ Lat 8" 8" 4/10  8"2/15 8" 2/15x each  8" 3/10  Lat/FWD 8" 4/10    Mini-squats 3/10  1/10 SL 15x 2/10 3/10 4/10   SLS on mat 3x 15" 15" x3 15" x3 3x 15" 3x 15"   Tandem stance mat 3x 15" 15" x3  15" x3 15' x3 3x 15'   Ther Ex        Hamstring curl  40# 2/15 40# 2/15 45# 2/15 40# B  2/15 50# B   2/15   LF knee extension 50# 2/15 50# 2/15 40# 2/15 30# 2/15 50#  2/15   Leg Press/toe raise 55# 2/15 55# 2/15 55# 2/15 55# 3/10 55#  2/15   Bike 11m L1 11m L1 11 min L1  S12 10 min 11m S12     Ther Activity        Gait Training        Modalities        CP  R knee 15 CP 15mCP 15m CP 15 defer STIM 15 min CP no STIM

## 2021-07-08 ENCOUNTER — OFFICE VISIT (OUTPATIENT)
Dept: PHYSICAL THERAPY | Facility: CLINIC | Age: 71
End: 2021-07-08
Payer: MEDICARE

## 2021-07-08 DIAGNOSIS — M24.661 ARTHROFIBROSIS OF KNEE JOINT, RIGHT: Primary | ICD-10-CM

## 2021-07-08 DIAGNOSIS — Z96.651 STATUS POST TOTAL RIGHT KNEE REPLACEMENT: ICD-10-CM

## 2021-07-08 DIAGNOSIS — M17.11 PRIMARY OSTEOARTHRITIS OF RIGHT KNEE: ICD-10-CM

## 2021-07-08 PROCEDURE — 97140 MANUAL THERAPY 1/> REGIONS: CPT | Performed by: PHYSICAL THERAPIST

## 2021-07-08 PROCEDURE — 97110 THERAPEUTIC EXERCISES: CPT | Performed by: PHYSICAL THERAPIST

## 2021-07-08 PROCEDURE — 97112 NEUROMUSCULAR REEDUCATION: CPT | Performed by: PHYSICAL THERAPIST

## 2021-07-08 NOTE — PROGRESS NOTES
Daily Note     Today's date: 2021  Patient name: Frantz Hobbs  : 1950  MRN: [id number]  Referring provider: Jurgen Moody DO  Dx:   Encounter Diagnosis     ICD-10-CM    1  Arthrofibrosis of knee joint, right  M24 661    2  Primary osteoarthritis of right knee  M17 11    3  Status post total right knee replacement  Z96 651                   Subjective: Pt reports she is on a liquid diet due to getting colonoscopy tomorrow  Pt requesting to not push and over exert self as a result  Objective: See treatment diary below  R knee PROM flex 119 deg AROM 112 deg, ext AROM -3 deg  Pt able to perform step ups without circumduction R LE      Assessment: Tolerated treatment well  Patient continues to demonstrate slow steady gains with ROM into flexion  Continued decrease in swelling with near normal gait pattern  Good tolerance for all TE performed through full available ROM  Plan: Continue per plan of care        Precautions: R TKR 2021  Date: 21   Pain: 0 0 1 1 0   Visit: 97 67 79 12 07   [id number]        PROM and jt mobilization R knee KW [facility]   Calf / Hs stretching KW [facility]    Neuro Re-Ed        Standing marching 4# 4/10 4# 2/10 5# 2/10 5# 2/15 4# 4/10   Standing hip abduction 4# 4/10 4# 2/10 5# 2/10 5# 2/15 4# 4/10   Step ups F/ Lat 8" 8" 4/10  8"2/15 8" 2/15x each  8" 3/10  Lat/FWD 8" 4/10    Mini-squats 3/10  1/10 SL 15x 2/10 3/10 4/10   SLS on mat 3x 15" 15" x3 15" x3 3x 15" 3x 15"   Tandem stance mat 3x 15" 15" x3  15" x3 15' x3 3x 15'   Ther Ex        Hamstring curl  40# 2/15 40# 2/15 45# 2/15 45# B  2/15 50# B   2/15   LF knee extension 50# 2/15 50# 2/15 40# 2/15 40# 2/15 50#  2/15   Leg Press/toe raise 55# 2/15 55# 2/15 55# 2/15 55# 3/10 55#  2/15   Bike 11m L1 11m L1 11 min L1  S12 11 min 11m S12     Ther Activity        Gait Training        Modalities        CP  R knee 15 CP 15mCP 15m CP 15min CP 15 min CP no STIM

## 2021-07-12 ENCOUNTER — OFFICE VISIT (OUTPATIENT)
Dept: PHYSICAL THERAPY | Facility: CLINIC | Age: 71
End: 2021-07-12
Payer: MEDICARE

## 2021-07-12 DIAGNOSIS — M17.11 PRIMARY OSTEOARTHRITIS OF RIGHT KNEE: ICD-10-CM

## 2021-07-12 DIAGNOSIS — M24.661 ARTHROFIBROSIS OF KNEE JOINT, RIGHT: Primary | ICD-10-CM

## 2021-07-12 DIAGNOSIS — Z96.651 STATUS POST TOTAL RIGHT KNEE REPLACEMENT: ICD-10-CM

## 2021-07-12 PROCEDURE — 97530 THERAPEUTIC ACTIVITIES: CPT

## 2021-07-12 PROCEDURE — 97112 NEUROMUSCULAR REEDUCATION: CPT

## 2021-07-12 PROCEDURE — 97110 THERAPEUTIC EXERCISES: CPT

## 2021-07-12 NOTE — PROGRESS NOTES
Daily Note     Today's date: 2021  Patient name: Udell Cheadle  : 1950  MRN: [id number]  Referring provider: Kvng Olvera DO  Dx:   Encounter Diagnosis     ICD-10-CM    1  Arthrofibrosis of knee joint, right  M24 661    2  Primary osteoarthritis of right knee  M17 11    3  Status post total right knee replacement  Z96 651        Start Time: 1045  Stop Time: 1145  Total time in clinic (min): 60 minutes    Subjective:Pt's  only c/o is mild knee stiffness  Objective: See treatment diary below      Assessment: Tolerated treatment well  Patient exhibited good technique with therapeutic exercises      Plan: Continue per plan of care        Precautions: R TKR 2021  Date: 21   Pain: 0 0 1 1 1   Visit: 15 99 25 69 19   [id number]        PROM and jt mobilization R knee KW [facility]   Calf / Hs stretching KW [facility]   Neuro Re-Ed        Standing marching 4# 4/10 4# 2/10 5# 2/10 5# 2/15 5# 4/10   Standing hip abduction 4# 4/10 4# 2/10 5# 2/10 5# 2/15 5# 4/10   Step ups F/ Lat 8" 8" 4/10  8"2/15 8" 2/15x each  8" 3/10  Lat/FWD 8" 3/10    Mini-squats 3/10  1/10 SL 15x 2/10 3/10 30x   SLS on mat 3x 15" 15" x3 15" x3 3x 15" 3x 15"   Tandem stance mat 3x 15" 15" x3  15" x3 15' x3 3x 15'   Ther Ex        Hamstring curl  40# 2/15 40# 2/15 45# 2/15 45# B  2/15 50#    2/15   LF knee extension 50# 2/15 50# 2/15 40# 2/15 40# 2/15 50#  2/15   Leg Press/toe raise 55# 2/15 55# 2/15 55# 2/15 55# 3/10 55#  2/15   Bike 11m L1 11m L1 11 min L1  S12 11 min 11m  L5     Ther Activity        Gait Training        Modalities        CP  R knee 15 CP 15mCP 15m CP 15min CP 15m

## 2021-07-15 ENCOUNTER — OFFICE VISIT (OUTPATIENT)
Dept: PHYSICAL THERAPY | Facility: CLINIC | Age: 71
End: 2021-07-15
Payer: MEDICARE

## 2021-07-15 DIAGNOSIS — M17.11 PRIMARY OSTEOARTHRITIS OF RIGHT KNEE: ICD-10-CM

## 2021-07-15 DIAGNOSIS — M24.661 ARTHROFIBROSIS OF KNEE JOINT, RIGHT: Primary | ICD-10-CM

## 2021-07-15 DIAGNOSIS — Z96.651 STATUS POST TOTAL RIGHT KNEE REPLACEMENT: ICD-10-CM

## 2021-07-15 PROCEDURE — 97110 THERAPEUTIC EXERCISES: CPT

## 2021-07-15 PROCEDURE — 97112 NEUROMUSCULAR REEDUCATION: CPT

## 2021-07-15 PROCEDURE — 97140 MANUAL THERAPY 1/> REGIONS: CPT

## 2021-07-15 NOTE — PROGRESS NOTES
Daily Note     Today's date: 7/15/2021  Patient name: Buck Branch  : 1950  MRN: [id number]  Referring provider: Janina Hahn DO  Dx:   Encounter Diagnosis     ICD-10-CM    1  Arthrofibrosis of knee joint, right  M24 661    2  Primary osteoarthritis of right knee  M17 11    3  Status post total right knee replacement  Z96 651        Start Time: 1045  Stop Time: 1145  Total time in clinic (min): 60 minutes    Subjective: Pt reports min c/o  She is anticipating return to the gym after she is finished PT  Objective: See treatment diary below  Held standing hip flexion, abd; substituted life fitness machine for abd  Assessment: Tolerated treatment well  Patient exhibited good technique with therapeutic exercises      Plan: Continue per plan of care        Precautions: R TKR 2021  Date: 7/15 6/30 7/6/21 7/8/21 7/12   Pain: 1 stiff 0 1 1 1   Visit: 59 04 88 11 78   Manuals        PROM and jt mobilization R knee ds [facility]   Calf / Hs stretching ds [facility]   Neuro Re-Ed        Standing marching DC 4# 2/10 5# 2/10 5# 2/15 5# 4/10   Standing hip abduction 20# 2/15 4# 2/10 5# 2/10 5# 2/15 5# 4/10   Step ups F/ Lat 8" 8" 4/10  8"2/15 8" 2/15x each  8" 3/10  Lat/FWD 8" 3/10    Mini-squats 30x 15x 2/10 3/10 30x   SLS on mat 3x 15" 15" x3 15" x3 3x 15" 3x 15"   Tandem stance mat 3x 15" 15" x3  15" x3 15' x3 3x 15'   Ther Ex        Hamstring curl 55# 2/15       LF knee extension 50# 2/15 50# 2/15 40# 2/15 40# 2/15 50#  2/15   Leg Press/toe raise 55# 2/15 55# 2/15 55# 2/15 55# 3/10 55#  2/15   Bike 15m L5 11m L1 11 min L1  S12 11 min 11m  L5     Ther Activity        Gait Training        Modalities        CP  R knee 15 CP 15mCP 15m CP 15min CP 15m

## 2021-07-19 ENCOUNTER — OFFICE VISIT (OUTPATIENT)
Dept: PHYSICAL THERAPY | Facility: CLINIC | Age: 71
End: 2021-07-19
Payer: MEDICARE

## 2021-07-19 DIAGNOSIS — M17.11 PRIMARY OSTEOARTHRITIS OF RIGHT KNEE: ICD-10-CM

## 2021-07-19 DIAGNOSIS — M24.661 ARTHROFIBROSIS OF KNEE JOINT, RIGHT: Primary | ICD-10-CM

## 2021-07-19 DIAGNOSIS — Z96.651 STATUS POST TOTAL RIGHT KNEE REPLACEMENT: ICD-10-CM

## 2021-07-19 PROCEDURE — 97112 NEUROMUSCULAR REEDUCATION: CPT

## 2021-07-19 PROCEDURE — 97140 MANUAL THERAPY 1/> REGIONS: CPT

## 2021-07-19 PROCEDURE — 97110 THERAPEUTIC EXERCISES: CPT

## 2021-07-19 NOTE — PROGRESS NOTES
Daily Note     Today's date: 2021  Patient name: Nicole Henriquez  : 1950  MRN: [id number]  Referring provider: Rg Agosto DO  Dx:   Encounter Diagnosis     ICD-10-CM    1  Arthrofibrosis of knee joint, right  M24 661    2  Primary osteoarthritis of right knee  M17 11    3  Status post total right knee replacement  Z96 651        Start Time: 1030  Stop Time: 1130  Total time in clinic (min): 60 minutes    Subjective: Patient reported continued stiffness in R knee, overall she is doing well  Objective: PTA directed patient in LE strengthening and ROM program, See treatment diary below  Assessment: Tolerated treatment well  Patient noted less difficulty with exercises as well as with ROM  She was also able to tolerate PROM without increase in pain  Plan: Continue per plan of care        Precautions: R TKR 2021  Date: 7/15 7/19 7/6/21 7/8/21 7/12   Pain: 1 stiff 0 1 1 1   Visit: 67 75 89 99 12   [id number]        [facility]   Calf / Hs stretching ds [facility]   Neuro Re-Ed        Standing marching DC  5# 2/10 5# 2/15 5# 4/10   Standing hip abduction 20# 2/15 20# 2/15 5# 2/10 5# 2/15 5# 4/10   Step ups F/ Lat 8" 8" 4/10 8" 4/10 8" 2/15x each  8" 3/10  Lat/FWD 8" 3/10    Mini-squats 30x 30x  2/10 3/10 30x   SLS on mat 3x 15" 3x 15" 15" x3 3x 15" 3x 15"   Tandem stance mat 3x 15" 3x 15" 15" x3 15' x3 3x 15'   Ther Ex        Hamstring curl 55# 2/15 55# 2/15      LF knee extension 50# 2/15 50# 2/15 40# 2/15 40# 2/15 50#  2/15   Leg Press/toe raise 55# 2/15 55# 2/15 55# 2/15 55# 3/10 55#  2/15   Bike 15m L5 15m L5 11 min L1  S12 11 min 11m  L5     Ther Activity        Gait Training        Modalities        CP  R knee 15 CP 15 CP 15m CP 15min CP 15m

## 2021-07-21 ENCOUNTER — OFFICE VISIT (OUTPATIENT)
Dept: PHYSICAL THERAPY | Facility: CLINIC | Age: 71
End: 2021-07-21
Payer: MEDICARE

## 2021-07-21 DIAGNOSIS — Z96.651 STATUS POST TOTAL RIGHT KNEE REPLACEMENT: ICD-10-CM

## 2021-07-21 DIAGNOSIS — M17.11 PRIMARY OSTEOARTHRITIS OF RIGHT KNEE: ICD-10-CM

## 2021-07-21 DIAGNOSIS — M24.661 ARTHROFIBROSIS OF KNEE JOINT, RIGHT: Primary | ICD-10-CM

## 2021-07-21 PROCEDURE — 97110 THERAPEUTIC EXERCISES: CPT

## 2021-07-21 PROCEDURE — 97140 MANUAL THERAPY 1/> REGIONS: CPT

## 2021-07-21 PROCEDURE — 97112 NEUROMUSCULAR REEDUCATION: CPT

## 2021-07-21 NOTE — LETTER
2021    Giorgi Barontal, [address]    Patient: Hayder Byers   YOB: 1950   Date of Visit: 2021     Encounter Diagnosis     ICD-10-CM    1  Arthrofibrosis of knee joint, right  M24 661    2  Primary osteoarthritis of right knee  M17 11    3  Status post total right knee replacement  Z96 651        Dear Dr Montes De Oca Angle:    Thank you for your recent referral of Hayder Byers  Please review the attached evaluation summary from [facility] recent visit  Please verify that you agree with the plan of care by signing the attached order  If you have any questions or concerns, please do not hesitate to call  I sincerely appreciate the opportunity to share in the care of one of your patients and hope to have another opportunity to work with you in the near future  Sincerely,    Cornelia Gruber, PT      Referring Provider:      I certify that I have read the below Plan of Care and certify the need for these services furnished under this plan of treatment while under my care  Giorgi Cano DO  [address] [place] [address] Dr Perez [phone]          Daily Note     Today's date: 2021  Patient name: Hayder Byers  : 1950  MRN: [id number]  Referring provider: Sabas Chavira DO  Dx:   Encounter Diagnosis     ICD-10-CM    1  Arthrofibrosis of knee joint, right  M24 661    2  Primary osteoarthritis of right knee  M17 11    3  Status post total right knee replacement  Z96 [id number]        Start Time: 1030  Stop Time: 1130  Total time in clinic (min): 60 minutes    Subjective: Pt states she plans to continue her conditioning at a community wellness program after today  Objective: See treatment diary below      Assessment: Tolerated treatment well  Patient exhibited good technique with therapeutic exercises and would benefit from continued PT      Plan: Continue per plan of care        Precautions: R TKR 2021  Date: 7/15 7/19 7/21 7/8/21 7/12   Pain: 1 stiff 0 1 stiff 1 1   Visit: 62 63 64 60 61   Manuals        PROM and jt mobilization R knee ds KW ds JH ds   Calf / Hs stretching ds KW ds JH ds   Neuro Re-Ed        Standing hip abduction 20# 2/15 20# 2/15 20# 2/10 5# 2/15 5# 4/10   Step ups F/ Lat 8" 8" 4/10 8" 4/10 8" 2/15x each  8" 310  Lat/FWD 8" 3/10    Mini-squats 30x 30x  2/10 3/10 30x   SLS on mat 3x 15" 3x 15" 15" x3 3x 15" 3x 15"   Tandem stance mat 3x 15" 3x 15" 15" x3 15' x3 3x 15'   Ther Ex        Hamstring curl 55# 2/15 55# 2/15      LF knee extension 50# 2/15 50# 2/15 40# 2/15 40# 2/15 50#  2/15   Leg Press/toe raise 55# 2/15 55# 2/15 55# 2/15 55# 3/10 55#  2/15   Bike 15m L5 15m L5 11 min L1  S12 11 min 11m  L5     Ther Activity        Gait Training        Modalities        CP  R knee 15 CP 15 CP 15m CP 15min CP 15m                                PT Discharge    Today's date: 2021  Patient name: Ned Emanuel  : 1950  MRN: [id number]  Referring provider: Gisel Trejo DO  Dx:   Encounter Diagnosis     ICD-10-CM    1  Arthrofibrosis of knee joint, right  M24 661    2  Primary osteoarthritis of right knee  M17 11    3  Status post total right knee replacement  Z96 [id number]          Assessment  Assessment details: Pt is a 78year old female presenting to Outpatient PT s/p R Total knee replacement on 2021  Pt has been seen for 64 visits of Outpatient PT with treatment consisting of joint mobilization,  and soft tissue mobilization to decrease tightness and spasm, manual stretching into flex and ext, AROM, strengthening balance and proprioception exercises with CP and IFC  Pt has made excellent progress with PT with pain nearly abolished with worst pain now 1/10 due to stiffness and soreness  Pt has returned to all activity without restriction except kneeling  Pt is able to negotiate stairs reciprocally and has returned to walking at least 2 miles   Pt still with mild extension ROM limitation however pt continues to perform stretching at home  Pt has reached max benefit of Outpatient PT and will transition to independent wellness program  All goals except ext ROM met  Impairments: abnormal or restricted ROM  Functional limitations:    Symptom irritability: lowUnderstanding of Dx/Px/POC: good   Prognosis: good    Goals  STG's once pt is post op to be met in 3-4 weeks  1  Decrease pain by 25% at worst with activity- met  2  Improve R knee AROM to at least 100 deg flex, -3 degrees extension- partially met  3  Improve R hip knee and ankle strength to within 10 lbs of L- met  4  Improve standing/walking tolerance to 30-45 minutes - met  5  Pt will sleep through night without waking due to pain-  met    LTG's to be met in 12 weeks  1  Decrease pain to 2/10 at worst with activity- met  2  Improve ROM to at least 115 degrees flex, 0 degrees extension-  Met passively  3  Improve R hip knee and ankle strength to within 3-5 lbs of L-  met  4  Improve step negotiation to reciprocal no LOB or instability- met  5  Improve standing and walking tolerance to at least 1 hour - met  6  Decrease swelling R knee by 1 cm-  met  7  Independent with HEP- met  8  Improve Foto score to at least 60%- met    Plan  Plan details:  d/c to HEP   Planned therapy interventions: home exercise program  Plan of Care beginning date: 2021  Plan of Care expiration date: 2021  Treatment plan discussed with: patient        Subjective Evaluation    History of Present Illness  Date of surgery: 2021  Mechanism of injury: surgery  Mechanism of injury: Pt only reports soreness and stiffness with kneeling and descending stairs otherwise she has returned to all activity including ambulation 2 miles  Pt continues to report compliance with HEP and stretching      Quality of life: good    Pain  Current pain ratin  At best pain ratin  At worst pain ratin (sore and stiff)  Location: R knee (lateral and medial knee)  Relieving factors: ice and rest  Aggravating factors: stair climbing    Treatments  Previous treatment: injection treatment  Current treatment: physical therapy  Patient Goals  Patient goals for therapy: decreased pain, decreased edema, increased motion, increased strength, independence with ADLs/IADLs, return to sport/leisure activities and improved balance  Patient goal: return to 2-4 mile walks- met at 2 miles        Objective     Observations     Additional Observation Details  Antalgic gait pattern with lack of R knee flex during ambulation with SPC- pt ambulating without A  D  without circumduction with normal gait pattern      Neurological Testing     Sensation     Knee   Left Knee   Intact: light touch    Right Knee   Intact: light touch     Active Range of Motion   Left Knee   Flexion: 115 degrees   Extension: 0 degrees     Right Knee   Flexion: 112 degrees   Extension: -5 degrees     Additional Active Range of Motion Details  Pt demonstrating weak quad set due to increase swelling- quad set returned to norm  (I) SLR no quad lag    Passive Range of Motion   Left Knee   Flexion: 120 degrees   Extension: 0 degrees     Right Knee   Flexion: 120 degrees   Extension: 0 degrees     Strength/Myotome Testing     Additional Strength Details                    R                  L  Hip       Flex      25 lbs          24 lbs      Abd      43 lbs          38 lbs      Add      33 lbs          27 lbs  Knee      Ext        49 lbs         44 lbs     Flex        41 lbs         33 lbs  Ankle       DF        26 lbs           21 lbs       PF        65 lbs           42 lbs    General Comments:      Knee Comments  Girth:                 R                  suprapat         42 3 cm       Mid joint          41 0 cm    Stair negotiation is with step to pattern due to pain- stair negotiation is reciprocal ascending 0-1 HR, descending 1 HR  Standing tolerance 30 minutes with increased pain- improved to 3 hours no increased soreness  Walking tolerance 1 1/2 blocks with SPC limited by pain- Walking tolerance no A D    Increased to 2 miles  Difficulty with donning shoes and socks requiring bending at waist- No difficulty or modification  Assist with bathing/showering- (I) with bathing and showering  Pt unable to kneel or squat- able to kneel but pain still present, improved squatting  Sleep disturbed by pain nightly with sleep tolerance 3 hours- sleep no longer disturbed by knee pain  Foto- 43- improved to 69             Precautions: R TKR 1/20/2021

## 2021-07-21 NOTE — PROGRESS NOTES
PT Discharge    Today's date: 2021  Patient name: Sergei Fisher  : 1950  MRN: [id number]  Referring provider: Radha Cummins DO  Dx:   Encounter Diagnosis     ICD-10-CM    1  Arthrofibrosis of knee joint, right  M24 661    2  Primary osteoarthritis of right knee  M17 11    3  Status post total right knee replacement  Z96 651          Assessment  Assessment details: Pt is a 78year old female presenting to Outpatient PT s/p R Total knee replacement on 2021  Pt has been seen for 64 visits of Outpatient PT with treatment consisting of joint mobilization,  and soft tissue mobilization to decrease tightness and spasm, manual stretching into flex and ext, AROM, strengthening balance and proprioception exercises with CP and IFC  Pt has made excellent progress with PT with pain nearly abolished with worst pain now 1/10 due to stiffness and soreness  Pt has returned to all activity without restriction except kneeling  Pt is able to negotiate stairs reciprocally and has returned to walking at least 2 miles  Pt still with mild extension ROM limitation however pt continues to perform stretching at home  Pt has reached max benefit of Outpatient PT and will transition to independent wellness program  All goals except ext ROM met  Impairments: abnormal or restricted ROM  Functional limitations:    Symptom irritability: lowUnderstanding of Dx/Px/POC: good   Prognosis: good    Goals  STG's once pt is post op to be met in 3-4 weeks  1  Decrease pain by 25% at worst with activity- met  2  Improve R knee AROM to at least 100 deg flex, -3 degrees extension- partially met  3  Improve R hip knee and ankle strength to within 10 lbs of L- met  4  Improve standing/walking tolerance to 30-45 minutes - met  5  Pt will sleep through night without waking due to pain-  met    LTG's to be met in 12 weeks  1  Decrease pain to 2/10 at worst with activity- met  2   Improve ROM to at least 115 degrees flex, 0 degrees extension- Met passively  3  Improve R hip knee and ankle strength to within 3-5 lbs of L-  met  4  Improve step negotiation to reciprocal no LOB or instability- met  5  Improve standing and walking tolerance to at least 1 hour - met  6  Decrease swelling R knee by 1 cm-  met  7  Independent with HEP- met  8  Improve Foto score to at least 60%- met    Plan  Plan details:  d/c to HEP   Planned therapy interventions: home exercise program  Plan of Care beginning date: 2021  Plan of Care expiration date: 2021  Treatment plan discussed with: patient        Subjective Evaluation    History of Present Illness  Date of surgery: 2021  Mechanism of injury: surgery  Mechanism of injury: Pt only reports soreness and stiffness with kneeling and descending stairs otherwise she has returned to all activity including ambulation 2 miles  Pt continues to report compliance with HEP and stretching  Quality of life: good    Pain  Current pain ratin  At best pain ratin  At worst pain ratin (sore and stiff)  Location: R knee (lateral and medial knee)  Relieving factors: ice and rest  Aggravating factors: stair climbing    Treatments  Previous treatment: injection treatment  Current treatment: physical therapy  Patient Goals  Patient goals for therapy: decreased pain, decreased edema, increased motion, increased strength, independence with ADLs/IADLs, return to sport/leisure activities and improved balance  Patient goal: return to 2-4 mile walks- met at 2 miles        Objective     Observations     Additional Observation Details  Antalgic gait pattern with lack of R knee flex during ambulation with SPC- pt ambulating without A  D  without circumduction with normal gait pattern      Neurological Testing     Sensation     Knee   Left Knee   Intact: light touch    Right Knee   Intact: light touch     Active Range of Motion   Left Knee   Flexion: 115 degrees   Extension: 0 degrees     Right Knee   Flexion: 112 degrees Extension: -5 degrees     Additional Active Range of Motion Details  Pt demonstrating weak quad set due to increase swelling- quad set returned to norm  (I) SLR no quad lag    Passive Range of Motion   Left Knee   Flexion: 120 degrees   Extension: 0 degrees     Right Knee   Flexion: 120 degrees   Extension: 0 degrees     Strength/Myotome Testing     Additional Strength Details                    R                  L  Hip       Flex      25 lbs          24 lbs      Abd      43 lbs          38 lbs      Add      33 lbs          27 lbs  Knee      Ext        49 lbs         44 lbs     Flex        41 lbs         33 lbs  Ankle       DF        26 lbs           21 lbs       PF        65 lbs           42 lbs    General Comments:      Knee Comments  Girth:                 R                  suprapat         42 3 cm       Mid joint          41 0 cm    Stair negotiation is with step to pattern due to pain- stair negotiation is reciprocal ascending 0-1 HR, descending 1 HR  Standing tolerance 30 minutes with increased pain- improved to 3 hours no increased soreness  Walking tolerance 1 1/2 blocks with SPC limited by pain- Walking tolerance no A D    Increased to 2 miles  Difficulty with donning shoes and socks requiring bending at waist- No difficulty or modification  Assist with bathing/showering- (I) with bathing and showering  Pt unable to kneel or squat- able to kneel but pain still present, improved squatting  Sleep disturbed by pain nightly with sleep tolerance 3 hours- sleep no longer disturbed by knee pain  Foto- 43- improved to 69             Precautions: R TKR 1/20/2021

## 2021-07-21 NOTE — PROGRESS NOTES
Daily Note     Today's date: 2021  Patient name: Clemente Conti  : 1950  MRN: [id number]  Referring provider: Raquel Hastings DO  Dx:   Encounter Diagnosis     ICD-10-CM    1  Arthrofibrosis of knee joint, right  M24 661    2  Primary osteoarthritis of right knee  M17 11    3  Status post total right knee replacement  Z96 651        Start Time: 1030  Stop Time: 1130  Total time in clinic (min): 60 minutes    Subjective: Pt states she plans to continue her conditioning at a community wellness program after today  Objective: See treatment diary below      Assessment: Tolerated treatment well  Patient exhibited good technique with therapeutic exercises and would benefit from continued PT      Plan: Continue per plan of care        Precautions: R TKR 2021  Date: 7/15 7/19 7/21 7/8/21 7/12   Pain: 1 stiff 0 1 stiff 1 1   Visit: 62 63 64 60 61   Manuals        PROM and jt mobilization R knee ds KW ds JH ds   Calf / Hs stretching ds KW ds JH ds   Neuro Re-Ed        Standing hip abduction 20# 2/15 20# 2/15 20# 2/10 5# 2/15 5# 4/10   Step ups F/ Lat 8" 8" 4/10 8" 4/10 8" 2/15x each  8" 3/10  Lat/FWD 8" 3/10    Mini-squats 30x 30x  2/10 3/10 30x   SLS on mat 3x 15" 3x 15" 15" x3 3x 15" 3x 15"   Tandem stance mat 3x 15" 3x 15" 15" x3 15' x3 3x 15'   Ther Ex        Hamstring curl 55# 2/15 55# 2/15      LF knee extension 50# 2/15 50# 2/15 40# 2/15 40# 2/15 50#  2/15   Leg Press/toe raise 55# 2/15 55# 2/15 55# 2/15 55# 3/10 55#  2/15   Bike 15m L5 15m L5 11 min L1  S12 11 min 11m  L5     Ther Activity        Gait Training        Modalities        CP  R knee 15 CP 15 CP 15m CP 15min CP 15m

## 2021-07-22 ENCOUNTER — OFFICE VISIT (OUTPATIENT)
Dept: OBGYN CLINIC | Facility: CLINIC | Age: 71
End: 2021-07-22
Payer: MEDICARE

## 2021-07-22 ENCOUNTER — APPOINTMENT (OUTPATIENT)
Dept: PHYSICAL THERAPY | Facility: CLINIC | Age: 71
End: 2021-07-22
Payer: MEDICARE

## 2021-07-22 ENCOUNTER — APPOINTMENT (OUTPATIENT)
Dept: RADIOLOGY | Facility: MEDICAL CENTER | Age: 71
End: 2021-07-22
Payer: MEDICARE

## 2021-07-22 VITALS — BODY MASS INDEX: 23.95 KG/M2 | WEIGHT: 158 LBS | HEIGHT: 68 IN

## 2021-07-22 DIAGNOSIS — Z96.651 S/P TOTAL KNEE REPLACEMENT, RIGHT: ICD-10-CM

## 2021-07-22 DIAGNOSIS — Z96.651 S/P TOTAL KNEE REPLACEMENT, RIGHT: Primary | ICD-10-CM

## 2021-07-22 PROCEDURE — 1124F ACP DISCUSS-NO DSCNMKR DOCD: CPT | Performed by: ORTHOPAEDIC SURGERY

## 2021-07-22 PROCEDURE — 99213 OFFICE O/P EST LOW 20 MIN: CPT | Performed by: ORTHOPAEDIC SURGERY

## 2021-07-22 PROCEDURE — 73562 X-RAY EXAM OF KNEE 3: CPT

## 2021-07-22 NOTE — PROGRESS NOTES
Assessment/Plan:  Assessment/Plan   Diagnoses and all orders for this visit:    S/P total knee replacement, right  -     XR knee 3 vw right non injury; Future          Discussed with patient that today's x-ray findings show well-healed right knee replacement under physical exam demonstrates adequate strength for normal function  She can discontinue formal physical therapy, and transition to home exercise program for maintenance  She can continue activities as tolerated without limitations or restrictions  She will be seen for follow-up in 6 months for repeat x-rays of the right knee and re-evaluation  Patient is in agreement with this treatment plan  The patient is doing quite well from a right total knee replacement  Physical examination shows range of motion from 0-120 degrees  Incision is clean, dry, intact  Continue home exercise program   Continue stretching  Return back in 6 months for re-evaluation with new x-rays of right knee -three views  Today's x-rays show anatomic placement of the prosthesis    Subjective:   Patient ID: Buck Branch is a 78 y o  female  HPI      Patient presents for follow-up evaluation 3 months s/p manipulation under anesthesia secondary to arthrofibrosis, and right total knee arthroplasty performed 1/20/2021  Patient states that she has seen significant progress, and is very satisfied with her outcomes postoperatively  She reports that she has been able to return to most activities of daily living without symptom exacerbation  She denies any recent onset bruising, swelling, numbness, tingling, or feelings of instability      The following portions of the patient's history were reviewed and updated as appropriate: allergies, current medications, past family history, past medical history, past social history, past surgical history and problem list     Past Medical History:   Diagnosis Date    Cardiac murmur due to mitral valve disorder     GERD (gastroesophageal reflux disease)     Hyperlipidemia     Hypertension     PONV (postoperative nausea and vomiting)     PVC's (premature ventricular contractions)      Past Surgical History:   Procedure Laterality Date    CARPAL TUNNEL RELEASE Bilateral     CATARACT EXTRACTION Bilateral     COLONOSCOPY      JOINT REPLACEMENT      KNEE SURGERY Right     PLANTAR'S WART EXCISION Right     SD MANIPULATN KNEE JT+ANESTHESIA Right 4/19/2021    Procedure: MANIPULATION JOINT KNEE;  Surgeon: Muriel Jameson DO;  Location: [facility] MAIN OR;  Service: Orthopedics    SD TOTAL KNEE ARTHROPLASTY Right 1/20/2021    Procedure: KNEE TOTAL ARTHROPLASTY;  Surgeon: Muriel Jameson DO;  Location: [facility] MAIN OR;  Service: Orthopedics    TONSILLECTOMY      WRIST SURGERY Bilateral      Family History   Problem Relation Age of Onset    Heart disease Mother     Hypertension Mother     Hyperlipidemia Mother     Heart disease Father     Hypertension Father     Hyperlipidemia Father     Cancer Sister      Social History     Tobacco Use    Smoking status: Never Smoker    Smokeless tobacco: Never Used   Substance Use Topics    Alcohol use:  Yes     Alcohol/week: 2 0 standard drinks     Types: 1 Glasses of wine, 1 Standard drinks or equivalent per week     Comment: occasional       Current Outpatient Medications:     aspirin (ECOTRIN) 325 mg EC tablet, Take 1 tablet (325 mg total) by mouth daily, Disp: 30 tablet, Rfl: 0    Calcium Carbonate-Vitamin D (CALCIUM 600/VITAMIN D PO), Take by mouth tues/thursday, Disp: , Rfl:     Cholecalciferol (Vitamin D3) 50 MCG (2000 UT) TABS, Take 2,000 Units by mouth Monday/wednesday/friday, Disp: , Rfl:     magnesium oxide (MAG-OX) 400 mg, Take 400 mg by mouth daily, Disp: , Rfl:     metoprolol succinate (TOPROL-XL) 25 mg 24 hr tablet, Take 25 mg by mouth daily, Disp: , Rfl:     naproxen (NAPROSYN) 500 mg tablet, Take 1 tablet (500 mg total) by mouth 2 (two) times a day with meals, Disp: 180 tablet, Rfl: 2    Omega-3 Fatty Acids (Ultra Omega-3 Fish Oil) 1400 MG CAPS, Take by mouth, Disp: , Rfl:     omeprazole (PriLOSEC) 20 mg delayed release capsule, Take 20 mg by mouth as needed, Disp: , Rfl:     oxyCODONE-acetaminophen (PERCOCET) 5-325 mg per tablet, Take 1 tablet by mouth every 4 (four) hours as needed for moderate painMax Daily Amount: 6 tablets, Disp: 30 tablet, Rfl: 0    ascorbic acid (VITAMIN C) 500 MG tablet, Take 1 tablet (500 mg total) by mouth 2 (two) times a day, Disp: 60 tablet, Rfl: 1    Multiple Vitamins-Minerals (multivitamin with minerals) tablet, Take 1 tablet by mouth daily, Disp: 30 tablet, Rfl: 1    Allergies   Allergen Reactions    Levaquin [Levofloxacin] Hives    Cephalosporins Rash and Other (See Comments)     rash    Dilaudid [Hydromorphone] Nausea Only     And sweaty    Keflex [Cephalexin] Rash    Lansoprazole Other (See Comments)     rash    Penicillins Rash    Tetracycline Rash       Review of Systems   Constitutional: Negative for fatigue  Gastrointestinal: Negative for nausea  Musculoskeletal:        As noted in HPI   Neurological: Negative for dizziness, weakness, numbness and headaches  All other systems reviewed and are negative  Objective:  Ht 5' 8" (1 727 m)   Wt 71 7 kg (158 lb)   BMI 24 02 kg/m²      Ortho Exam   Right knee -  Patient is with normal, gait pattern and no assistive device   Incision is well-healed  Skin is warm dry to touch with no signs of erythema, ecchymosis, or infection   No soft tissue swelling or effusion noted   No tenderness to palpation on exam   ROM:  0°- 120°   Knee is stable to varus, valgus, anterior, posterior stress   Negative Homans sign    2+ TP and DP pulses with brisk capillary refill to the toes  Patient is neurologically intact distally    Physical Exam  Constitutional:       Appearance: She is well-developed     HENT:      Right Ear: External ear normal       Left Ear: External ear normal       Nose: Nose normal    Eyes: Conjunctiva/sclera: Conjunctivae normal       Pupils: Pupils are equal, round, and reactive to light  Pulmonary:      Effort: Pulmonary effort is normal    Musculoskeletal:         General: Normal range of motion  Cervical back: Normal range of motion  Skin:     General: Skin is warm and dry  Neurological:      Mental Status: She is alert and oriented to person, place, and time  Psychiatric:         Behavior: Behavior normal          Thought Content:  Thought content normal          Judgment: Judgment normal          Imaging:    Supervising physician has personally reviewed pertinent imaging and PACs, impression is as follows:     review of radiographic series taken 7/22/2021 of the right knee shows visible total knee prosthesis with well-maintained anatomical alignment and good fixation    Scribe Attestation    I,:  Maribel Garzon am acting as a scribe while in the presence of the attending physician :       I,:  Nicola Shah, DO personally performed the services described in this documentation    as scribed in my presence :

## 2021-08-23 ENCOUNTER — TELEPHONE (OUTPATIENT)
Dept: OBGYN CLINIC | Facility: HOSPITAL | Age: 71
End: 2021-08-23

## 2021-08-23 NOTE — TELEPHONE ENCOUNTER
Patient sees Dr Louis Sims    Patient called to find out if she needs to take antibiotics prior to dental appointments  She has one scheduled for November  Patient is also inquiring about a card she can obtain with her surgery information on it       Call back # [phone]

## 2021-10-28 NOTE — PROGRESS NOTES
Daily Note     Today's date: 3/12/2021  Patient name: Williams Quiles  : 1950  MRN: [id number]  Referring provider: Yvonne Dang DO  Dx:   Encounter Diagnosis     ICD-10-CM    1  Primary osteoarthritis of right knee  M17 11    2  Status post total right knee replacement  [id number] [id number]                   Subjective: Pt reports pain decreased to 4/10 with ambulation and weightbearing  Pt reports stiffness R knee especially distal quad muscle  Objective: See treatment diary below  PROM flex 73 degrees AROM ext -8 deg  Gait is without SPC at times with decreased step length  Instructed pt in scar massage with vitamin E for tissue remodeling and to decrease soft tissue restriction proximal incision  Assessment: Tolerated treatment well  Patient still demonstrating stiffness and tightness at distal quad with STM and stretching  Pt demonstrating fair to good control at R knee with TE throughout available ROM  Mild increase in soreness post stretching  Plan: Continue per plan of care        Precautions: R TKR 2021    Date: 3/3 3/5 3/8 3/10 3/12   Pain: 6 3 2 no motion  5-6 motion 2-3 4   Visit:  13 14 15   Manuals        PROM and jt mobilization R knee JH KW ds  JH   Calf and Hs stretching  KW ds [facility]   IT band stretch  KW3 [facility] CHARLES JH   Desensitization         Edema K tape        Neuro Re-Ed        Standing marching 1# 2/10 1# 2/10 1# 2/10 1# 2/10 1 5# 2/10   Standing hip abduction 1# 2/10 L/R 1# 2/10 1# 2/10 1# 2/10 1 5# 2/10   Step ups        Mini-squats 2/10 2/10 2/10 2/10 2/10   SLS        Tandem stance 15" x3 ea 15" x3 ea 15' 3x 15'  15" x3   Toe-raises 3/10 30x  30x 3/10 3/10   Knee flexion stretch on TM   10x 10" hold  home home    Ther Ex        Quad sets 30 x 3" hold 30x 3" 30x 3" 30x 3" 30x 3   SLR c hang  1# 4/5 2 hangs 4/5 2+ 4/5 4 hangs 1 5# 4/5 4hangs   Heel slides L4 3/10 L4 3/10 L4 3/10 L4 3/10 L5 3/10   Supine hip abd L4 3/10 L4 3/10 L4 3/10 L4 3/10 L5 3/10   SAQ's 1# 3/10 It's ok to take vitamins 1# 3/10 3/10 3/10 1 5#3/10   Adductor squeezes  30x  30x 3" 30x  30x   T-band hs curls L4 3/10 L4 3/10 L4 2/15 L4 2/15 L5 2/15   LAQ's 1# 3x10 1# 3/10 2/10 2/10 1 5# 3/10   Seated heel slides 10x 10" 10x 10" 10x 5" 20x 5"  20x 5"   Nu-step 10 min L3 10m L3  10m L3 10m L4 10 min L4   Ther Activity                Gait Training        TM   1  5mph 5m 2 0mph 10m ------->   Modalities        CP w/ IFC R knee 15 min Biphasic 15 min  15 m 15 min 15 min

## 2022-01-27 ENCOUNTER — OFFICE VISIT (OUTPATIENT)
Dept: OBGYN CLINIC | Facility: CLINIC | Age: 72
End: 2022-01-27
Payer: MEDICARE

## 2022-01-27 ENCOUNTER — APPOINTMENT (OUTPATIENT)
Dept: RADIOLOGY | Facility: MEDICAL CENTER | Age: 72
End: 2022-01-27
Payer: MEDICARE

## 2022-01-27 VITALS
WEIGHT: 163 LBS | HEIGHT: 68 IN | BODY MASS INDEX: 24.71 KG/M2 | DIASTOLIC BLOOD PRESSURE: 89 MMHG | HEART RATE: 89 BPM | SYSTOLIC BLOOD PRESSURE: 167 MMHG

## 2022-01-27 DIAGNOSIS — Z96.651 S/P TOTAL KNEE REPLACEMENT, RIGHT: ICD-10-CM

## 2022-01-27 DIAGNOSIS — Z96.651 S/P TOTAL KNEE REPLACEMENT, RIGHT: Primary | ICD-10-CM

## 2022-01-27 PROCEDURE — 73562 X-RAY EXAM OF KNEE 3: CPT

## 2022-01-27 PROCEDURE — 99213 OFFICE O/P EST LOW 20 MIN: CPT | Performed by: ORTHOPAEDIC SURGERY

## 2022-01-27 NOTE — PROGRESS NOTES
Assessment/Plan:   Diagnoses and all orders for this visit:    S/P total knee replacement, right  -     XR knee 3 vw right non injury; Future    Reviewed today's physical exam findings and x-ray findings with patient at time of visit  She is cleared to continue activity as tolerated without limitations or restrictions  Discussed with patient that the clicking that she is experiencing is just a contact of the plastic button with the metal femoral cap that constitutes the implant components  Should any questions or concerns arise, she can contact the office to be seen for follow-up  Otherwise, she will be seen in 1 year for her annual follow-up and repeat x-rays of the right knee  Patient expresses understanding and is in agreement with this treatment plan  The patient is doing quite well from a right total knee replacement  Physical exam shows full strength and full motion along her right knee  There is a painless arc of motion  X-rays show well-seated total knee replacement as well  Continue home exercise program   Continue stretching  Follow-up in 1 year for re-evaluation with new x-rays of right knee-three views  If her condition changes, she will not hesitate to let us know    Subjective:   Patient ID: Brigette Deras  1950 female    HPI  Patient is a 73 y o  female who presents for follow-up evaluation 9 months s/p manipulation under anesthesia secondary to arthrofibrosis, and right knee arthroplasty performed 1/20/2021  She is now 1 year status post knee replacement surgery  She was last seen in regards to this issue on 7/22/2021, at which time she was instructed to discontinue formal physical therapy and transition to home exercise program for maintenance  She was also cleared to resume all activities as tolerated without limitations or restrictions  On today's presentation she reports occasional tingling/burning sensations in the proximal lateral portion of the surgery site    She denies any associated bruising, swelling, or mechanical symptoms  The following portions of the patient's history were reviewed and updated as appropriate:  Past medical history, past surgical history, Family history, social history, current medications and allergies    Past Medical History:   Diagnosis Date    Cardiac murmur due to mitral valve disorder     GERD (gastroesophageal reflux disease)     Hyperlipidemia     Hypertension     PONV (postoperative nausea and vomiting)     PVC's (premature ventricular contractions)        Past Surgical History:   Procedure Laterality Date    CARPAL TUNNEL RELEASE Bilateral     CATARACT EXTRACTION Bilateral     COLONOSCOPY      JOINT REPLACEMENT      KNEE SURGERY Right     PLANTAR'S WART EXCISION Right     [place] MANIPULATN KNEE JT+ANESTHESIA Right 4/19/2021    Procedure: MANIPULATION JOINT KNEE;  Surgeon: Omer Byers DO;  Location: [address] [place] [address] OR;  Service: Orthopedics    [place] TOTAL KNEE ARTHROPLASTY Right 1/20/2021    Procedure: KNEE TOTAL ARTHROPLASTY;  Surgeon: Omer Byers DO;  Location: [address] [place] [address] OR;  Service: Orthopedics    TONSILLECTOMY      WRIST SURGERY Bilateral        Family History   Problem Relation Age of Onset    Heart disease Mother     Hypertension Mother     Hyperlipidemia Mother     Heart disease Father     Hypertension Father     Hyperlipidemia Father     Cancer Sister        Social History     Socioeconomic History    Marital status: /Civil Union     Spouse name: None    Number of children: None    Years of education: None    Highest education level: None   Occupational History    None   Tobacco Use    Smoking status: Never Smoker    Smokeless tobacco: Never Used   Vaping Use    Vaping Use: Never used   Substance and Sexual Activity    Alcohol use:  Yes     Alcohol/week: 2 0 standard drinks     Types: 1 Glasses of wine, 1 Standard drinks or equivalent per week     Comment: occasional    Drug use: Never    Sexual activity: Yes Partners: Male   Other Topics Concern    None   Social History Narrative    None     Social Determinants of Health     Financial Resource Strain: Not on file   Food Insecurity: Not on file   Transportation Needs: Not on file   Physical Activity: Not on file   Stress: Not on file   Social Connections: Not on file   Intimate Partner Violence: Not on file   Housing Stability: Not on file         Current Outpatient Medications:     Calcium Carbonate-Vitamin D (CALCIUM 600/VITAMIN D PO), Take by mouth tues/thursday, Disp: , Rfl:     Cholecalciferol (Vitamin D3) 50 MCG (2000 UT) TABS, Take 2,000 Units by mouth Monday/wednesday/friday, Disp: , Rfl:     magnesium oxide (MAG-OX) 400 mg, Take 400 mg by mouth daily, Disp: , Rfl:     metoprolol succinate (TOPROL-XL) 25 mg 24 hr tablet, Take 25 mg by mouth daily, Disp: , Rfl:     Omega-3 Fatty Acids (Ultra Omega-3 Fish Oil) 1400 MG CAPS, Take by mouth, Disp: , Rfl:     omeprazole (PriLOSEC) 20 mg delayed release capsule, Take 20 mg by mouth as needed, Disp: , Rfl:     ascorbic acid (VITAMIN C) 500 MG tablet, Take 1 tablet (500 mg total) by mouth 2 (two) times a day, Disp: 60 tablet, Rfl: 1    aspirin (ECOTRIN) 325 mg EC tablet, Take 1 tablet (325 mg total) by mouth daily (Patient not taking: Reported on 1/27/2022 ), Disp: 30 tablet, Rfl: 0    Multiple Vitamins-Minerals (multivitamin with minerals) tablet, Take 1 tablet by mouth daily, Disp: 30 tablet, Rfl: 1    naproxen (NAPROSYN) 500 mg tablet, Take 1 tablet (500 mg total) by mouth 2 (two) times a day with meals (Patient not taking: Reported on 1/27/2022 ), Disp: 180 tablet, Rfl: 2    oxyCODONE-acetaminophen (PERCOCET) 5-325 mg per tablet, Take 1 tablet by mouth every 4 (four) hours as needed for moderate painMax Daily Amount: 6 tablets (Patient not taking: Reported on 1/27/2022 ), Disp: 30 tablet, Rfl: 0    Allergies   Allergen Reactions    Levaquin [Levofloxacin] Hives    Cephalosporins Rash and Other (See Comments)     rash    Dilaudid [Hydromorphone] Nausea Only     And sweaty    Keflex [Cephalexin] Rash    Lansoprazole Other (See Comments)     rash    Penicillins Rash    Tetracycline Rash       Review of Systems   Constitutional: Negative for chills, fever and unexpected weight change  HENT: Negative for hearing loss, nosebleeds and sore throat  Eyes: Negative for pain, redness and visual disturbance  Respiratory: Negative for cough, shortness of breath and wheezing  Cardiovascular: Negative for chest pain, palpitations and leg swelling  Gastrointestinal: Negative for abdominal pain, nausea and vomiting  Endocrine: Negative for polydipsia and polyuria  Genitourinary: Negative for dysuria and hematuria  Musculoskeletal:        As noted in HPI   Skin: Negative for rash and wound  Neurological: Negative for dizziness, numbness and headaches  Psychiatric/Behavioral: Negative for decreased concentration and suicidal ideas  The patient is not nervous/anxious           Objective:  /89 (BP Location: Right arm, Patient Position: Sitting, Cuff Size: Standard)   Pulse 89   Ht 5' 8" (1 727 m)   Wt 73 9 kg (163 lb)   BMI 24 78 kg/m²     Ortho Exam  Right knee  -  Patient ambulates with smooth and steady gait pattern  Uses no assistive device  No anatomical deformity  Incision is well-healed  Skin is warm and dry to touch with no signs of erythema, ecchymosis, infection  No soft tissue swelling, no effusion noted  ROM 0-125  Nontender to palpation on exam, palpable clicking with active knee flexion and extension resulting from contact surfaces of implant hardware  Flexor and extensor mechanisms intact  Knee is stable to varus and valgus stress  - Lachman's  - Anterior Drawer, - Posterior Drawer  - medial Clarence's, - lateral Clarence's  - Pivot Shift  Patella tracks centrally without palpable crepitus  Calf compartments are soft and supple  2+ TP and DP pulses with brisk capillary refill to the toes  Sural, saphenous, tibial, superficial and deep peroneal motor and sensory distributions intact  Sensation light touch intact distally    Physical Exam  Vitals and nursing note reviewed  Constitutional:       General: She is not in acute distress  Appearance: She is well-developed  HENT:      Head: Normocephalic and atraumatic  Eyes:      Conjunctiva/sclera: Conjunctivae normal    Cardiovascular:      Rate and Rhythm: Normal rate  Pulmonary:      Effort: Pulmonary effort is normal    Musculoskeletal:      Cervical back: Neck supple  Skin:     General: Skin is warm and dry  Capillary Refill: Capillary refill takes less than 2 seconds  Neurological:      Mental Status: She is alert and oriented to person, place, and time     Psychiatric:         Mood and Affect: Mood normal          Behavior: Behavior normal           Diagnostic Test Review:  Attending Physician has personally reviewed pertinent imaging in PACS, impression is as follows:    Review of radiographic series taken 1/27/2022 of the right knee shows stable total knee prosthesis with maintained alignment and no signs of loosening    Procedures   No procedures performed this visit    Scribe Attestation    I,:  Everardo Ponce am acting as a scribe while in the presence of the attending physician :       I,:  Pauline Guerrero, DO personally performed the services described in this documentation    as scribed in my presence :

## 2023-02-02 ENCOUNTER — OFFICE VISIT (OUTPATIENT)
Dept: OBGYN CLINIC | Facility: CLINIC | Age: 73
End: 2023-02-02

## 2023-02-02 ENCOUNTER — APPOINTMENT (OUTPATIENT)
Dept: RADIOLOGY | Facility: MEDICAL CENTER | Age: 73
End: 2023-02-02

## 2023-02-02 VITALS
SYSTOLIC BLOOD PRESSURE: 164 MMHG | HEART RATE: 91 BPM | DIASTOLIC BLOOD PRESSURE: 84 MMHG | BODY MASS INDEX: 26.37 KG/M2 | WEIGHT: 174 LBS | HEIGHT: 68 IN

## 2023-02-02 DIAGNOSIS — Z96.651 S/P TOTAL KNEE REPLACEMENT, RIGHT: Primary | ICD-10-CM

## 2023-02-02 DIAGNOSIS — M25.562 LEFT KNEE PAIN, UNSPECIFIED CHRONICITY: ICD-10-CM

## 2023-02-02 DIAGNOSIS — M17.12 PRIMARY OSTEOARTHRITIS OF LEFT KNEE: ICD-10-CM

## 2023-02-02 DIAGNOSIS — Z96.651 S/P TOTAL KNEE REPLACEMENT, RIGHT: ICD-10-CM

## 2023-02-02 RX ORDER — FAMOTIDINE 10 MG
10 TABLET ORAL 2 TIMES DAILY
COMMUNITY

## 2023-02-02 RX ORDER — MESALAMINE 0.38 G/1
CAPSULE, EXTENDED RELEASE ORAL
COMMUNITY
Start: 2023-01-10

## 2023-02-02 NOTE — PROGRESS NOTES
Assessment:  1  S/P total knee replacement, right  XR knee 3 vw right non injury      2  Left knee pain, unspecified chronicity  XR knee 3 vw left non injury      3  Primary osteoarthritis of left knee            Plan:  2 years s/p right TKA, 1/20/2021  · Patient doing well  · Follow up in one year    Left knee osteoarthritis  · Patient offered and declines steroid injection    To do next visit:  Return in about 1 year (around 2/2/2024) for re-check with x-rays  The above stated was discussed in layman's terms and the patient expressed understanding  All questions were answered to the patient's satisfaction  Patient is doing quite well regarding her right total knee replacement  Strength and motion intact  She starting develop arthritis along her left side  She is not ready for any injections  Follow-up in 1 year for evaluation with new x-rays of right knee-3 views  If there is any symptomatology with her left knee, new x-rays will be performed at that juncture      Scribe Attestation    I,:  [employer] am acting as a scribe while in the presence of the attending physician :       I,:  Jalil Mercado DO personally performed the services described in this documentation    as scribed in my presence :             Subjective:   Wayne Perez is a 81 y o  female who presents 2 years s/p right TKA, 1/20/2021, and left knee  She is doing well in regard to right knee  Today she complains of left lateral knee pain  Prolonged activity aggravates the left knee while rest alleviates          Review of systems negative unless otherwise specified in HPI    Past Medical History:   Diagnosis Date   • Cardiac murmur due to mitral valve disorder    • GERD (gastroesophageal reflux disease)    • Hyperlipidemia    • Hypertension    • PONV (postoperative nausea and vomiting)    • PVC's (premature ventricular contractions)        Past Surgical History:   Procedure Laterality Date   • CARPAL TUNNEL RELEASE Bilateral • CATARACT EXTRACTION Bilateral    • COLONOSCOPY     • JOINT REPLACEMENT     • KNEE SURGERY Right    • PLANTAR'S WART EXCISION Right    • [place] ARTHRP KNE CONDYLE&PLATU MEDIAL&LAT COMPARTMENTS Right 1/20/2021    Procedure: KNEE TOTAL ARTHROPLASTY;  Surgeon: Mihir Dobbs DO;  Location: [address] [place] [address] MAIN OR;  Service: Orthopedics   • [place] MANIPULATION KNEE JOINT UNDER GENERAL ANESTHESIA Right 4/19/2021    Procedure: MANIPULATION JOINT KNEE;  Surgeon: Mihir Dobbs DO;  Location: [address] [place] [address] MAIN OR;  Service: Orthopedics   • TONSILLECTOMY     • WRIST SURGERY Bilateral        Family History   Problem Relation Age of Onset   • Heart disease Mother    • Hypertension Mother    • Hyperlipidemia Mother    • Heart disease Father    • Hypertension Father    • Hyperlipidemia Father    • Cancer Sister        Social History     Occupational History   • Not on file   Tobacco Use   • Smoking status: Never   • Smokeless tobacco: Never   Vaping Use   • Vaping Use: Never used   Substance and Sexual Activity   • Alcohol use:  Yes     Alcohol/week: 2 0 standard drinks     Types: 1 Glasses of wine, 1 Standard drinks or equivalent per week     Comment: occasional   • Drug use: Never   • Sexual activity: Yes     Partners: Male         Current Outpatient Medications:   •  Cholecalciferol (Vitamin D3) 50 MCG (2000 UT) TABS, Take 2,000 Units by mouth Monday/wednesday/friday, Disp: , Rfl:   •  famotidine (PEPCID) 10 mg tablet, Take 10 mg by mouth 2 (two) times a day, Disp: , Rfl:   •  magnesium oxide (MAG-OX) 400 mg, Take 400 mg by mouth daily, Disp: , Rfl:   •  mesalamine (APRISO) 0 375 g 24 hr capsule, , Disp: , Rfl:   •  metoprolol succinate (TOPROL-XL) 25 mg 24 hr tablet, Take 25 mg by mouth daily, Disp: , Rfl:   •  Multiple Vitamins-Minerals (multivitamin with minerals) tablet, Take 1 tablet by mouth daily, Disp: 30 tablet, Rfl: 1  •  Omega-3 Fatty Acids (Ultra Omega-3 Fish Oil) 1400 MG CAPS, Take by mouth, Disp: , Rfl:   •  ascorbic acid (VITAMIN C) 500 MG tablet, Take 1 tablet (500 mg total) by mouth 2 (two) times a day, Disp: 60 tablet, Rfl: 1  •  aspirin (ECOTRIN) 325 mg EC tablet, Take 1 tablet (325 mg total) by mouth daily (Patient not taking: Reported on 1/27/2022 ), Disp: 30 tablet, Rfl: 0  •  Calcium Carbonate-Vitamin D (CALCIUM 600/VITAMIN D PO), Take by mouth tues/thursday (Patient not taking: Reported on 2/2/2023), Disp: , Rfl:   •  naproxen (NAPROSYN) 500 mg tablet, Take 1 tablet (500 mg total) by mouth 2 (two) times a day with meals (Patient not taking: Reported on 1/27/2022 ), Disp: 180 tablet, Rfl: 2  •  omeprazole (PriLOSEC) 20 mg delayed release capsule, Take 20 mg by mouth as needed (Patient not taking: Reported on 2/2/2023), Disp: , Rfl:   •  oxyCODONE-acetaminophen (PERCOCET) 5-325 mg per tablet, Take 1 tablet by mouth every 4 (four) hours as needed for moderate painMax Daily Amount: 6 tablets (Patient not taking: Reported on 1/27/2022 ), Disp: 30 tablet, Rfl: 0    Allergies   Allergen Reactions   • Levaquin [Levofloxacin] Hives   • Cephalosporins Rash and Other (See Comments)     rash   • Dilaudid [Hydromorphone] Nausea Only     And sweaty   • Keflex [Cephalexin] Rash   • Lansoprazole Other (See Comments)     rash   • Penicillins Rash   • Tetracycline Rash            Vitals:    02/02/23 1117   BP: 164/84   Pulse: 91       Objective:  Physical exam  · General: Awake, Alert, Oriented  · Eyes: Pupils equal, round and reactive to light  · Heart: regular rate and rhythm  · Lungs: No audible wheezing  · Abdomen: soft                    Ortho Exam  Right knee:  Well healed anterior incision  No erythema and no ecchymosis  Stable to varus and valgus stress  Extensor mechanism intact  Extension 0  Flexion 120  Calf compartments soft and supple  Sensation intact  Toes are warm sensate and mobile    Left knee:  TTP over lateral joint line  No erythema or ecchymosis  No effusion or swelling  Normal strength  Good ROM with crepitus   Calf compartments soft and supple  Sensation intact  Toes are warm sensate and mobile        Diagnostics, reviewed and taken today if performed as documented: The attending physician has personally reviewed the pertinent films in PACS and interpretation is as follows:  Right knee x-ray:  Well aligned prosthesis with no acute changes  Left knee x-ray: Moderate arthritic changes  Procedures, if performed today:    Procedures    None performed      Portions of the record may have been created with voice recognition software  Occasional wrong word or "sound a like" substitutions may have occurred due to the inherent limitations of voice recognition software  Read the chart carefully and recognize, using context, where substitutions have occurred

## 2024-02-01 ENCOUNTER — APPOINTMENT (OUTPATIENT)
Dept: RADIOLOGY | Facility: MEDICAL CENTER | Age: 74
End: 2024-02-01
Payer: MEDICARE

## 2024-02-01 ENCOUNTER — OFFICE VISIT (OUTPATIENT)
Dept: OBGYN CLINIC | Facility: CLINIC | Age: 74
End: 2024-02-01
Payer: MEDICARE

## 2024-02-01 VITALS
WEIGHT: 174 LBS | DIASTOLIC BLOOD PRESSURE: 80 MMHG | HEART RATE: 73 BPM | HEIGHT: 68 IN | BODY MASS INDEX: 26.37 KG/M2 | SYSTOLIC BLOOD PRESSURE: 153 MMHG

## 2024-02-01 DIAGNOSIS — Z96.651 S/P TOTAL KNEE REPLACEMENT, RIGHT: ICD-10-CM

## 2024-02-01 DIAGNOSIS — M17.12 PRIMARY OSTEOARTHRITIS OF LEFT KNEE: ICD-10-CM

## 2024-02-01 DIAGNOSIS — Z96.651 S/P TOTAL KNEE REPLACEMENT, RIGHT: Primary | ICD-10-CM

## 2024-02-01 PROCEDURE — 73562 X-RAY EXAM OF KNEE 3: CPT

## 2024-02-01 PROCEDURE — 99213 OFFICE O/P EST LOW 20 MIN: CPT | Performed by: ORTHOPAEDIC SURGERY

## 2024-02-01 NOTE — PROGRESS NOTES
Assessment/Plan:  1. S/P total knee replacement, right  XR knee 3 vw right non injury      2. Primary osteoarthritis of left knee          Scribe Attestation      I,:  Brain Solano am acting as a scribe while in the presence of the attending physician.:       I,:  Miller Eli, DO personally performed the services described in this documentation    as scribed in my presence.:             Jennifer upon examination and review of the x-rays of the right knee demonstrate stable appearing total knee arthroplasty.  She is doing very well clinically with full range of motion and stability in all planes.  She may continue with activities of daily living as tolerated without specific restrictions.  I will see her back in approximately 1 year for follow-up evaluation of her right knee.    In regards to her left knee a steroid injection was offered to her today.  She did defer this as she does not feel her symptoms are severe enough to warrant an injection at this time.  She may continue with activities of daily living as tolerated.  I will see her back on an as-needed basis in regards to her left knee.    The patient is doing quite well from her right total knee replacement.  Strength and motion intact.  Continue home exercise program.  Follow-up 1 year for evaluation with new x-rays of right knee-3 views    Subjective:   Jennifer Duval is a 73 y.o. female who presents for follow-up of her right knee.  She is now 3 years status post right total knee arthroplasty.  Date of surgery January 20, 2021 she notes she is doing very well in regards to her right knee and has been able to participate in activities of daily living with little to no difficulty.  She does have persistent pain in the left knee that she describes as mild to moderate lateral knee pain.  This is exacerbated with prolonged weightbearing activities.      Review of Systems   Constitutional:  Negative for chills, fever and unexpected weight change.   HENT:   Negative for hearing loss, nosebleeds and sore throat.    Eyes:  Negative for pain, redness and visual disturbance.   Respiratory:  Negative for cough, shortness of breath and wheezing.    Cardiovascular:  Negative for chest pain, palpitations and leg swelling.   Gastrointestinal:  Negative for abdominal pain, nausea and vomiting.   Endocrine: Negative for polydipsia and polyuria.   Genitourinary:  Negative for dysuria and hematuria.   Musculoskeletal:  Negative for arthralgias and myalgias.        See HPI   Skin:  Negative for rash and wound.   Neurological:  Negative for dizziness, numbness and headaches.   Psychiatric/Behavioral:  Negative for decreased concentration and suicidal ideas. The patient is not nervous/anxious.          Past Medical History:   Diagnosis Date    Cardiac murmur due to mitral valve disorder     GERD (gastroesophageal reflux disease)     Hyperlipidemia     Hypertension     PONV (postoperative nausea and vomiting)     PVC's (premature ventricular contractions)        Past Surgical History:   Procedure Laterality Date    CARPAL TUNNEL RELEASE Bilateral     CATARACT EXTRACTION Bilateral     COLONOSCOPY      JOINT REPLACEMENT      KNEE SURGERY Right     PLANTAR'S WART EXCISION Right     ID ARTHRP KNE CONDYLE&PLATU MEDIAL&LAT COMPARTMENTS Right 1/20/2021    Procedure: KNEE TOTAL ARTHROPLASTY;  Surgeon: Miller Eli DO;  Location:  MAIN OR;  Service: Orthopedics    ID MANIPULATION KNEE JOINT UNDER GENERAL ANESTHESIA Right 4/19/2021    Procedure: MANIPULATION JOINT KNEE;  Surgeon: Miller Eli DO;  Location:  MAIN OR;  Service: Orthopedics    TONSILLECTOMY      WRIST SURGERY Bilateral        Family History   Problem Relation Age of Onset    Heart disease Mother     Hypertension Mother     Hyperlipidemia Mother     Heart disease Father     Hypertension Father     Hyperlipidemia Father     Cancer Sister        Social History     Occupational History    Not on file   Tobacco Use    Smoking  status: Never    Smokeless tobacco: Never   Vaping Use    Vaping status: Never Used   Substance and Sexual Activity    Alcohol use: Yes     Alcohol/week: 2.0 standard drinks of alcohol     Types: 1 Glasses of wine, 1 Standard drinks or equivalent per week     Comment: occasional    Drug use: Never    Sexual activity: Yes     Partners: Male         Current Outpatient Medications:     Cholecalciferol (Vitamin D3) 50 MCG (2000 UT) TABS, Take 2,000 Units by mouth Monday/wednesday/friday, Disp: , Rfl:     famotidine (PEPCID) 10 mg tablet, Take 10 mg by mouth 2 (two) times a day, Disp: , Rfl:     magnesium oxide (MAG-OX) 400 mg, Take 400 mg by mouth daily, Disp: , Rfl:     mesalamine (APRISO) 0.375 g 24 hr capsule, , Disp: , Rfl:     metoprolol succinate (TOPROL-XL) 25 mg 24 hr tablet, Take 25 mg by mouth daily, Disp: , Rfl:     Omega-3 Fatty Acids (Ultra Omega-3 Fish Oil) 1400 MG CAPS, Take by mouth, Disp: , Rfl:     ascorbic acid (VITAMIN C) 500 MG tablet, Take 1 tablet (500 mg total) by mouth 2 (two) times a day, Disp: 60 tablet, Rfl: 1    aspirin (ECOTRIN) 325 mg EC tablet, Take 1 tablet (325 mg total) by mouth daily (Patient not taking: Reported on 1/27/2022 ), Disp: 30 tablet, Rfl: 0    Calcium Carbonate-Vitamin D (CALCIUM 600/VITAMIN D PO), Take by mouth tues/thursday (Patient not taking: Reported on 2/2/2023), Disp: , Rfl:     Multiple Vitamins-Minerals (multivitamin with minerals) tablet, Take 1 tablet by mouth daily, Disp: 30 tablet, Rfl: 1    naproxen (NAPROSYN) 500 mg tablet, Take 1 tablet (500 mg total) by mouth 2 (two) times a day with meals (Patient not taking: Reported on 1/27/2022 ), Disp: 180 tablet, Rfl: 2    omeprazole (PriLOSEC) 20 mg delayed release capsule, Take 20 mg by mouth as needed (Patient not taking: Reported on 2/2/2023), Disp: , Rfl:     oxyCODONE-acetaminophen (PERCOCET) 5-325 mg per tablet, Take 1 tablet by mouth every 4 (four) hours as needed for moderate painMax Daily Amount: 6 tablets  (Patient not taking: Reported on 1/27/2022 ), Disp: 30 tablet, Rfl: 0    Allergies   Allergen Reactions    Levaquin [Levofloxacin] Hives    Cephalosporins Rash and Other (See Comments)     rash    Dilaudid [Hydromorphone] Nausea Only     And sweaty    Keflex [Cephalexin] Rash    Lansoprazole Other (See Comments)     rash    Penicillins Rash    Tetracycline Rash       Objective:  Vitals:    02/01/24 1025   BP: 153/80   Pulse: 73       Right Knee Exam     Tenderness   The patient is experiencing no tenderness.     Range of Motion   Extension:  0   Flexion:  110     Tests   Varus: negative Valgus: negative  Lachman:  Anterior - negative    Posterior - negative  Drawer:  Anterior - negative    Posterior - negative    Other   Erythema: absent  Scars: present  Sensation: normal  Pulse: present  Swelling: none  Effusion: no effusion present          Observations     Right Knee   Negative for effusion.       Physical Exam  Vitals and nursing note reviewed.   Constitutional:       Appearance: She is well-developed.   HENT:      Head: Normocephalic and atraumatic.      Right Ear: External ear normal.      Left Ear: External ear normal.      Nose: Nose normal.   Eyes:      General:         Right eye: No discharge.         Left eye: No discharge.      Conjunctiva/sclera: Conjunctivae normal.   Cardiovascular:      Rate and Rhythm: Normal rate.   Pulmonary:      Effort: Pulmonary effort is normal. No respiratory distress.   Musculoskeletal:      Cervical back: Normal range of motion and neck supple.      Right knee: No effusion.      Comments: As per Ortho exam   Skin:     General: Skin is warm and dry.   Neurological:      Mental Status: She is alert and oriented to person, place, and time.   Psychiatric:         Behavior: Behavior normal.         Thought Content: Thought content normal.         Judgment: Judgment normal.         I have personally reviewed pertinent films in PACS and my interpretation is as follows:    X-rays  of the right knee obtained today February 1, 2024 demonstrate stable appearing total knee arthroplasty without evidence of lucency or implant failure.  No acute fracture observed.      This document was created using speech voice recognition software.   Grammatical errors, random word insertions, pronoun errors, and incomplete sentences are an occasional consequence of this system due to software limitations, ambient noise, and hardware issues.   Any formal questions or concerns about content, text, or information contained within the body of this dictation should be directly addressed to the provider for clarification.

## 2025-03-06 ENCOUNTER — APPOINTMENT (OUTPATIENT)
Dept: RADIOLOGY | Facility: MEDICAL CENTER | Age: 75
End: 2025-03-06
Payer: MEDICARE

## 2025-03-06 ENCOUNTER — OFFICE VISIT (OUTPATIENT)
Dept: OBGYN CLINIC | Facility: CLINIC | Age: 75
End: 2025-03-06
Payer: MEDICARE

## 2025-03-06 VITALS
BODY MASS INDEX: 26.37 KG/M2 | HEART RATE: 75 BPM | OXYGEN SATURATION: 98 % | WEIGHT: 174 LBS | HEIGHT: 68 IN | TEMPERATURE: 98.1 F

## 2025-03-06 DIAGNOSIS — Z96.651 S/P TOTAL KNEE REPLACEMENT, RIGHT: ICD-10-CM

## 2025-03-06 DIAGNOSIS — Z96.651 S/P TOTAL KNEE REPLACEMENT, RIGHT: Primary | ICD-10-CM

## 2025-03-06 DIAGNOSIS — M17.12 PRIMARY OSTEOARTHRITIS OF LEFT KNEE: ICD-10-CM

## 2025-03-06 PROCEDURE — 99213 OFFICE O/P EST LOW 20 MIN: CPT | Performed by: ORTHOPAEDIC SURGERY

## 2025-03-06 PROCEDURE — 73562 X-RAY EXAM OF KNEE 3: CPT

## 2025-03-06 NOTE — ASSESSMENT & PLAN NOTE
OPERATIVE NOTE      Patient: Jeffrey Matute 55 year old male     Location: [facility]    Surgery DT/TM: Procedure(s):  RIGHT TOTAL KNEE ARTHROPLASTY     Pre-Op Diagnosis: RIGHT KNEE ARTHRITIS     Post-Op Diagnosis: Same    Procedure: RIGHT TOTAL KNEE ARTHROPLASTY     Anesthesia Type: Spinal with an abductor canal block                                   Surgeon: Joseph Zhou DO     Assistant: Matthew Tsai PA-C    Findings: Same as preoperative diagnosis    Indications for Procedure: Jeffrey Matute is a 55 year old male presenting with arthritic right knee pain.  I have been treating the patient for over 5 years for knee pain.  Initially he had a meniscus tear that was treated with an arthroscopy then he slowly started getting progressively worsening arthritic knee pain.  Initially conservative treatment modalities were effective cortisone and viscosupplementation injections, NSAIDs knee braces and over the years does conservative treatment modalities started to fail.  He had a second meniscus tears to the right knee which required arthroscopy.  Intraoperatively he was noted to have grade IV chondromalacia.  Patient tried again with the conservative treatment modalities which were unsuccessful and now his arthritic knee pain is gone to the point where it is intolerable he cannot perform his activities of daily living.  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  Since he has failed all other conservative treatment modalities even less invasive procedures I recommended joint replacement surgery.  Risk and benefits of the procedure were discussed with the patient at great length in the office she made an informed decision to proceed with the above recommended procedure.     Operative Procedure: The patient was seen preoperatively the left lower extremity was marked.  He was given the abductor canal block by the  Reviewed physical exam and imaging with patient at time of visit. The patient is 4 year(s) s/p Right total knee arthroplasty. Radiographic findings demonstrate a well-seated prosthesis, with no signs of loosening. She continues to do well post-operatively. Continue weightbearing activities. She will follow-up in 1 year for an annual appointment, with repeat XR of her Right knee.The patient expresses understanding and is in agreement with today's treatment plan.     Orders:    XR knee 3 vw right non injury; Future     anesthesiologist then brought the operating room placed supine on operating table and given a spinal anesthetic by the anesthesiologist.  A tourniquet was placed on the patient's right thigh, is inflated to 300 mmHg tourniquet time was 45 minutes.  Once the leg was thoroughly prepped and draped in a standard orthopedic fashion, universal protocol of a timeout was taken to confirm that the right lower extremity was the correct operative site and that the patient received her preoperative TXA and IV antibiotics within 1 hour of incision.  Once that was done Esmarch was used to exsanguinate the leg and the tourniquet was inflated.  A 6-7 cm midline incision was made dissection was carried down through the skin and subcutaneous tissue then a medial parapatellar arthrotomy incision was made.  The patella was inverted and a freehand cut was used to resect the articular surface of the patella.  It was sized to a size 32 and the drill holes for the patella implant were made.  Attention was then taken to the femur where the drill hole for the intramedullary guide was placed.  The distal femoral cutting block was placed pinned and the distal femoral resection was made.  The sizing guide was placed and was sized to a size 8 the 4-in-1 cutting guide was then placed pinned and the remaining distal femoral resections were made.  Any other remaining osteophytes were removed with a rongeur.  Attention was then taken to the tibia where the extra medullary guide was placed.  After the appropriate alignment was done the cutting block was pinned and the proximal tibial resection was made.  It was sized to a size E tibial tray.  The trial implants were placed in the above sizes with the size 10 polyethylene with the appropriate sizes.  They allow for full range of motion 0 to over 120 degrees of flexion providing the appropriate stability and balancing both flexion and extension gaps.  After the keel punch for the tibial implant was  done.  The trial implants were removed the knee was irrigated out with the pulse lavage and given the Exparel local while the cement was being mixed.  Once the cement was at the appropriate consistency the actual implants were placed and the excess cement was removed.  The knee was then irrigated out again with the irrisept and pulse lavage while the cement hardened.  The arthrotomy incision was then closed with a running 0 V-Loc.  2.0 V-Loc was used to close the subcutaneous layer four-point 0 V-Loc was used to close skin.  It was dressed with Dermabond 4 x 4's web roll and Ace wrap.  She was given a Polar Care extubated and transferred to the postanesthesia care unit in stable condition.  All sponge and sharp counts were correct.    Specimens Removed:   Order Name Source Comment Collection Info Order Time   SURGICAL PATHOLOGY Knee, Right  Collected By: Joseph Zhou DO 7/13/2022  1:08 PM     How should test results be released to the patient's MyChart portal?   Automatic Release             Estimated Blood Loss: 100 mL     Complications: NONE     Disposition: Patient is to be discharged home, he is hemodynamically stable in the PACU, will be weightbearing as tolerated, okay to shower with a Silverlon dressing, weightbearing as tolerated, will be on pharmacological DVT prophylaxis and follow-up at his already scheduled appointment in 2 weeks.     Implants: Biomet/Ramírez persona knee without size 8 standard CR femur, size E tibial tray, size 10 E polyethylene, size 32 patella  Implant Name Type Inv. Item Serial No.  Lot No. LRB No. Used Action   CEMENT BN BIOMET 40 - [id number] Filler CEMENT BN BIOMET 40 [id number] [employer] [id number] Right 2 Implanted   INSERT KN RT 8-11 E-F 10MM PERSONA VIVACIT-E [facility] - [id number] Insert INSERT KN RT 8-11 E-F 10MM PERSONA VIVACIT-E [facility] [id number] [employer] Joint [employer]construction [id number] Right 1 Implanted   COMPONENT PTLR 32MM PERSONA VIVACIT-E ALL  POLY KN - [id number] Component COMPONENT PTLR 32MM PERSONA VIVACIT-E ALL POLY KN [id number] [employer] Joint [employer]construction [id number] Right 1 Implanted   COMPONENT FEM 8 STD KN RT CRCTE RTN CEMENT PERSONA - [id number] Component COMPONENT FEM 8 STD KN RT CRCTE RTN CEMENT PERSONA [id number] [employer] Joint [employer] [id number] Right 1 Implanted   Tibia Implant   -809-02  [id number] Right 1 Implanted          Joseph Zhou D.O.  [facility] [place]  [address] [facility] [address]  [place] [address]  [email or web address]  [phone] (office)  [phone] (fax)

## 2025-03-06 NOTE — PROGRESS NOTES
Assessment & Plan  S/P total knee replacement, right  Reviewed physical exam and imaging with patient at time of visit. The patient is 4 year(s) s/p Right total knee arthroplasty. Radiographic findings demonstrate a well-seated prosthesis, with no signs of loosening. She continues to do well post-operatively. Continue weightbearing activities. She will follow-up in 1 year for an annual appointment, with repeat XR of her Right knee.The patient expresses understanding and is in agreement with today's treatment plan.     Orders:    XR knee 3 vw right non injury; Future    Primary osteoarthritis of left knee  Patient has osteoarthritis of the left knee. She declined an injection at today's visit. She may follow up as need if her symptoms worsen. The patient expresses understanding and is in agreement with today's treatment plan. They may contact the office with any question or concerns.       Orders:    XR knee 3 vw left non injury; Future        The patient is doing quite well in regards to her right total knee replacement.  There is full strength full motion.  No instability.  She is starting to have some pain along her left knee.  X-rays performed today do show arthritic changes.  It is not severe to recommend knee reconstruction.  Injection therapy was discussed but declined.  Return back in 1 year for evaluation with new x-rays of right knee-3 views.  If her left knee becomes more symptomatic, updated x-rays were performed at that time    Subjective:   Patient ID: Jennifer Duval  1950     HPI  Patient is a 74 y.o. female who presents for annual evaluation of right TKA, DOS 1/20/2021 as well as left knee osteoarthritis. She states the right knee continues to do well. Her left knee is getting slightly more painful    The following portions of the patient's history were reviewed and updated as appropriate:  Past medical history, past surgical history, Family history, social history, current medications and  allergies    Past Medical History:   Diagnosis Date    Cardiac murmur due to mitral valve disorder     GERD (gastroesophageal reflux disease)     Hyperlipidemia     Hypertension     PONV (postoperative nausea and vomiting)     PVC's (premature ventricular contractions)        Past Surgical History:   Procedure Laterality Date    CARPAL TUNNEL RELEASE Bilateral     CATARACT EXTRACTION Bilateral     COLONOSCOPY      JOINT REPLACEMENT      KNEE SURGERY Right     PLANTAR'S WART EXCISION Right     IL ARTHRP KNE CONDYLE&PLATU MEDIAL&LAT COMPARTMENTS Right 1/20/2021    Procedure: KNEE TOTAL ARTHROPLASTY;  Surgeon: Miller Eli DO;  Location:  MAIN OR;  Service: Orthopedics    IL MANIPULATION KNEE JOINT UNDER GENERAL ANESTHESIA Right 4/19/2021    Procedure: MANIPULATION JOINT KNEE;  Surgeon: Miller Eli DO;  Location:  MAIN OR;  Service: Orthopedics    TONSILLECTOMY      WRIST SURGERY Bilateral        Family History   Problem Relation Age of Onset    Heart disease Mother     Hypertension Mother     Hyperlipidemia Mother     Heart disease Father     Hypertension Father     Hyperlipidemia Father     Cancer Sister        Social History     Socioeconomic History    Marital status: /Civil Union     Spouse name: None    Number of children: None    Years of education: None    Highest education level: None   Occupational History    None   Tobacco Use    Smoking status: Never    Smokeless tobacco: Never   Vaping Use    Vaping status: Never Used   Substance and Sexual Activity    Alcohol use: Yes     Alcohol/week: 2.0 standard drinks of alcohol     Types: 1 Glasses of wine, 1 Standard drinks or equivalent per week     Comment: occasional    Drug use: Never    Sexual activity: Yes     Partners: Male   Other Topics Concern    None   Social History Narrative    None     Social Drivers of Health     Financial Resource Strain: Low Risk  (9/26/2023)    [facility]     Overall Financial Resource Strain (CARDIA)     Difficulty of Paying Living Expenses: Not hard at all   Food Insecurity: No Food Insecurity (9/26/2023)    [facility]    Hunger Vital Sign     Worried About Running Out of Food in the Last Year: Never true     Ran Out of Food in the Last Year: Never true   Transportation Needs: No Transportation Needs (9/26/2023)    [facility]    PRAPARE - Transportation     Lack of Transportation (Medical): No     Lack of Transportation (Non-Medical): No   Physical Activity: Not on file   Stress: Not on file   Social Connections: Not on file   Intimate Partner Violence: Not At Risk (9/26/2023)    Received from [facility]    Humiliation, Afraid, Rape, and Kick questionnaire     Fear of Current or Ex-Partner: No     Emotionally Abused: No     Physically Abused: No     Sexually Abused: No   Housing Stability: Low Risk  (9/26/2023)    [facility]    Housing Stability Vital Sign     Unable to Pay for Housing in the Last Year: No     Number of Places Lived in the Last Year: 1     Unstable Housing in the Last Year: No         Current Outpatient Medications:     Cholecalciferol (Vitamin D3) 50 MCG (2000 UT) TABS, Take 2,000 Units by mouth Monday/wednesday/friday, Disp: , Rfl:     famotidine (PEPCID) 10 mg tablet, Take 10 mg by mouth 2 (two) times a day, Disp: , Rfl:     magnesium oxide (MAG-OX) 400 mg, Take 400 mg by mouth daily, Disp: , Rfl:     mesalamine (APRISO) 0.375 g 24 hr capsule, , Disp: , Rfl:     metoprolol succinate (TOPROL-XL) 25 mg 24 hr tablet, Take 25 mg by mouth daily, Disp: , Rfl:     Omega-3 Fatty Acids (Ultra Omega-3 Fish Oil) 1400 MG CAPS, Take by mouth, Disp: , Rfl:     ascorbic acid (VITAMIN C) 500 MG tablet, Take 1 tablet (500 mg total) by mouth 2 (two) times a day,  Disp: 60 tablet, Rfl: 1    aspirin (ECOTRIN) 325 mg EC tablet, Take 1 tablet (325 mg total) by mouth daily (Patient not taking: Reported on 1/27/2022 ), Disp: 30 tablet, Rfl: 0    Calcium Carbonate-Vitamin D (CALCIUM 600/VITAMIN D PO), Take by mouth tues/thursday (Patient not taking: Reported on 3/6/2025), Disp: , Rfl:     Multiple Vitamins-Minerals (multivitamin with minerals) tablet, Take 1 tablet by mouth daily, Disp: 30 tablet, Rfl: 1    naproxen (NAPROSYN) 500 mg tablet, Take 1 tablet (500 mg total) by mouth 2 (two) times a day with meals (Patient not taking: Reported on 1/27/2022 ), Disp: 180 tablet, Rfl: 2    omeprazole (PriLOSEC) 20 mg delayed release capsule, Take 20 mg by mouth as needed (Patient not taking: Reported on 2/2/2023), Disp: , Rfl:     oxyCODONE-acetaminophen (PERCOCET) 5-325 mg per tablet, Take 1 tablet by mouth every 4 (four) hours as needed for moderate painMax Daily Amount: 6 tablets (Patient not taking: Reported on 1/27/2022 ), Disp: 30 tablet, Rfl: 0    Allergies   Allergen Reactions    Levaquin [Levofloxacin] Hives    Cephalosporins Rash and Other (See Comments)     rash    Dilaudid [Hydromorphone] Nausea Only     And sweaty    Keflex [Cephalexin] Rash    Lansoprazole Other (See Comments)     rash    Penicillins Rash    Tetracycline Rash       Review of Systems   Constitutional:  Negative for chills and fever.   HENT:  Negative for ear pain and sore throat.    Eyes:  Negative for pain and visual disturbance.   Respiratory:  Negative for cough and shortness of breath.    Cardiovascular:  Negative for chest pain and palpitations.   Gastrointestinal:  Negative for abdominal pain and vomiting.   Genitourinary:  Negative for dysuria and hematuria.   Musculoskeletal:  Negative for arthralgias and back pain.   Skin:  Negative for color change and rash.   Neurological:  Negative for seizures and syncope.   All other systems reviewed and are negative.       Objective:  Pulse 75   Temp 98.1 °F  "(36.7 °C) (Temporal)   Ht 5' 8\" (1.727 m)   Wt 78.9 kg (174 lb)   SpO2 98%   BMI 26.46 kg/m²     Ortho Exam  bilateral knee(s) -   Patient ambulates with steady gait pattern  Uses No assistive device  Genu Varus anatomical deformity on the left  Skin is warm and dry to touch with no signs of erythema, ecchymosis, or infection   No soft tissue swelling or effusion noted  ROM (0° - 115°)   Strength: 5/5 throughout  Left Knee TTP over medial joint line, TTP over lateral joint line  Right Knee: No tenderness to palpation on exam  Flexor and extensor mechanisms are intact   Knee is  stable to varus and valgus stress  Patella tracks centrally with palpable crepitus  Calf compartments are soft and supple  - Gloria's sign  2+ DP and PT pulses with brisk capillary refill to the toes  Sural, saphenous, tibial, superficial, and deep peroneal motor and sensory distributions intact  Sensation light touch intact distally      Physical Exam  Vitals and nursing note reviewed.   Constitutional:       General: She is not in acute distress.     Appearance: She is well-developed.   HENT:      Head: Normocephalic and atraumatic.   Eyes:      Conjunctiva/sclera: Conjunctivae normal.   Cardiovascular:      Rate and Rhythm: Normal rate and regular rhythm.      Heart sounds: No murmur heard.  Pulmonary:      Effort: Pulmonary effort is normal. No respiratory distress.      Breath sounds: Normal breath sounds.   Abdominal:      Palpations: Abdomen is soft.      Tenderness: There is no abdominal tenderness.   Musculoskeletal:         General: No swelling.      Cervical back: Neck supple.   Skin:     General: Skin is warm and dry.      Capillary Refill: Capillary refill takes less than 2 seconds.   Neurological:      Mental Status: She is alert.   Psychiatric:         Mood and Affect: Mood normal.          Diagnostic Test Review:  X-Ray of right, left knee obtained on 3/6/2025 were reviewed and demonstrate right well-seated total knee " prosthesis with maintained anatomical alignment and no signs of loosening. Left knee demonstrates tricompartmental osteoarthrosis.       Procedures   None performed at today's visit    Scribe Attestation      I,:  Nkechi Mora am acting as a scribe while in the presence of the attending physician.:       I,:  Miller Eli, DO personally performed the services described in this documentation    as scribed in my presence.:

## (undated) DEVICE — TOWEL SURG XR DETECT GREEN STRL RFD

## (undated) DEVICE — PREP SURGICAL PURPREP 26ML

## (undated) DEVICE — GARMENT,MEDLINE,DVT,INT,CALF,FOAM,MED: Brand: MEDLINE

## (undated) DEVICE — TUBING SUCTION 5MM X 12 FT

## (undated) DEVICE — COBAN 6 IN STERILE

## (undated) DEVICE — PLASTIC ADHESIVE BANDAGE: Brand: CURITY

## (undated) DEVICE — STOCKINETTE,IMPERVIOUS,12X48,STERILE: Brand: MEDLINE

## (undated) DEVICE — NO-SCRATCH ™ SMALL WHITNEY CURETTE ™ IS A SINGLE-USE, PLASTIC CURETTE FOR QUICKLY APPLYING, MANIPULATING AND REMOVING BONE CEMENT DURING HIP AND KNEE REPLACEMENT SURGERY. THE PLASTIC IS SOFTER THAN STEEL INSTRUMENTS, REDUCING THE RISK OF DAMAGING THE PROSTHESIS WITH METAL INSTRUMENTS.  THE CURETTE’S 6MM TIP REMOVES EXCESS CEMENT FROM REPLACEMENT HIPS AND KNEES. EASY-TO-MANEUVER, THE SMALL BLUE CURETTE LETS YOU REMOVE CEMENT FROM ALL EDGES OF THE PROSTHESIS.NO-SCRATCH WHITNEY SMALL CURETTE FEATURES:SAFER THAN STEEL- MADE OF PLASTIC - STURDY YET SOFTER THAN SURGICAL STEEL.HANDIER- EACH TOOL HAS A MOLDED-IN THUMB INDENTATION INSTANTLY ORIENTING THE TOOL.- EASIER TO MANEUVER IN HARD TO SEE PLACES.- COLOR-CODED FOR EASY IDENTIFICATION.FASTER- COMES INDIVIDUALLY PACKAGED IN STERILE, PEEL OPEN POUCH, READY TO GO.- APPLIES, MANIPULATES, OR REMOVES CEMENT WITH FINGERTIP PRECISION.ECONOMICAL- THE COST OF A SINGLE REVISION DWARFS THE COST OF A SINGLE-USE CURETTE. - DISPOSABLE – THERE’S NO NEED TO WASTE TIME REMOVING HARDENED CEMENT OR RE-STERILIZING TOOLS.- LESS EXPENSIVE TO BUY AND INVENTORY - ORDER ONLY THE TOOL YOU USE.- PACKAGED 25 INDIVIDUALLY WRAPPED TOOLS TO A CARTON FOR CONVENIENT SHELF STORAGE.: Brand: WHITNEY NO-SCRATCH CURETTE (SMALL)

## (undated) DEVICE — PLUMEPEN PRO 10FT

## (undated) DEVICE — NEEDLE 21 G X 1 1/2 SAFETY

## (undated) DEVICE — CEMENT BOWL VACUUM MIXING

## (undated) DEVICE — PADDING CAST 6IN COTTON STRL

## (undated) DEVICE — CUFF TOURNIQUET 30 X 4 IN QUICK CONNECT DISP 1BLA

## (undated) DEVICE — 3M™ STERI-DRAPE™ U-DRAPE 1015: Brand: STERI-DRAPE™

## (undated) DEVICE — GLOVE INDICATOR PI UNDERGLOVE SZ 7.5 BLUE

## (undated) DEVICE — DRESSING XEROFORM 5 X 9

## (undated) DEVICE — NEEDLE 18 G X 1 1/2 SAFETY

## (undated) DEVICE — BETHLEHEM UNIV TOTAL KNEE, KIT: Brand: CARDINAL HEALTH

## (undated) DEVICE — STAPLER SKIN 35 WIDE ULC APPOSE

## (undated) DEVICE — ALCOHOL PREP, 2 PLY, LARGE, MADE IN USA, SATURATED WITH 70% ISOPROPYL ALCOHOL, FOR EXTERNAL USE ONLY: Brand: WEBCOL

## (undated) DEVICE — MEDI-VAC YANK SUCT HNDL W/TPRD BULBOUS TIP: Brand: CARDINAL HEALTH

## (undated) DEVICE — BLOOD CONSERVATION SYSTEM WITH QUICK DISCONNECT AND PVC DRAIN: Brand: CBCII CONSTAVAC

## (undated) DEVICE — TIBURON SPLIT SHEET: Brand: CONVERTORS

## (undated) DEVICE — 3M™ IOBAN™ 2 ANTIMICROBIAL INCISE DRAPE 6650EZ: Brand: IOBAN™ 2

## (undated) DEVICE — FRAZIER SUCTION INSTRUMENT 18 FR W/OBTURATOR, NO CONTROL VENT: Brand: FRAZIER

## (undated) DEVICE — GAUZE SPONGES,16 PLY: Brand: CURITY

## (undated) DEVICE — HOOD: Brand: FLYTE, SURGICOOL

## (undated) DEVICE — INTENDED FOR TISSUE SEPARATION, AND OTHER PROCEDURES THAT REQUIRE A SHARP SURGICAL BLADE TO PUNCTURE OR CUT.: Brand: BARD-PARKER ® CARBON RIB-BACK BLADES

## (undated) DEVICE — GLOVE INDICATOR PI UNDERGLOVE SZ 8 BLUE

## (undated) DEVICE — READY WET SKIN SCRUB TRAY-LF: Brand: MEDLINE INDUSTRIES, INC.

## (undated) DEVICE — SUT VICRYL 2-0 CP-1 27 IN J266H

## (undated) DEVICE — SYRINGE 20ML LL

## (undated) DEVICE — BAG DECANTER

## (undated) DEVICE — 4-PORT MANIFOLD: Brand: NEPTUNE 2

## (undated) DEVICE — DRAIN CHANNEL RND FULL FLUTED 19FR W/TROCAR

## (undated) DEVICE — BLADE SAW HALL MICROPOWER OSC 1.27MM X 19.5MM X 86MM

## (undated) DEVICE — SUT VICRYL 1 CP 27 IN J196H

## (undated) DEVICE — SAW BLADE RECIPROCATING 179

## (undated) DEVICE — BLADE REAMER PATELLA 46MM

## (undated) DEVICE — GLOVE SRG BIOGEL 8

## (undated) DEVICE — CAPIT KNEE GSF FLX CEM FEM/CEM TIB/FLX SURF/STD PAT - ZIMMER

## (undated) DEVICE — Device

## (undated) DEVICE — SUT VICRYL 0 CP-1 27 IN J267H

## (undated) DEVICE — STRAP LITHOTOMY CANDY CANE

## (undated) DEVICE — ABDOMINAL PAD: Brand: DERMACEA

## (undated) DEVICE — ACE WRAP 6 IN XL STERILE

## (undated) DEVICE — SURGICAL GOWN, XL SMARTSLEEVE: Brand: CONVERTORS

## (undated) DEVICE — SKIN MARKER DUAL TIP WITH RULER CAP, FLEXIBLE RULER AND LABELS: Brand: DEVON

## (undated) DEVICE — 3M™ DURAPORE™ SURGICAL TAPE 1538-3, 3 INCH X 10 YARD (7,5CM X 9,1M), 4 ROLLS/BOX: Brand: 3M™ DURAPORE™

## (undated) DEVICE — 3M™ IOBAN™ 2 ANTIMICROBIAL INCISE DRAPE 6651EZ: Brand: IOBAN™ 2

## (undated) DEVICE — GLOVE SRG BIOGEL 7.5

## (undated) DEVICE — SPONGE STICK WITH PVP-I: Brand: KENDALL

## (undated) DEVICE — FAN SPRAY KIT: Brand: PULSAVAC®